# Patient Record
Sex: MALE | Race: BLACK OR AFRICAN AMERICAN | NOT HISPANIC OR LATINO | Employment: OTHER | URBAN - METROPOLITAN AREA
[De-identification: names, ages, dates, MRNs, and addresses within clinical notes are randomized per-mention and may not be internally consistent; named-entity substitution may affect disease eponyms.]

---

## 2017-01-31 ENCOUNTER — OFFICE VISIT (OUTPATIENT)
Dept: BARIATRICS | Facility: CLINIC | Age: 47
End: 2017-01-31
Payer: MEDICARE

## 2017-01-31 VITALS
HEART RATE: 76 BPM | WEIGHT: 315 LBS | SYSTOLIC BLOOD PRESSURE: 130 MMHG | HEIGHT: 67 IN | DIASTOLIC BLOOD PRESSURE: 74 MMHG | BODY MASS INDEX: 49.44 KG/M2

## 2017-01-31 DIAGNOSIS — E66.01 MORBID OBESITY WITH BMI OF 70 AND OVER, ADULT: Primary | ICD-10-CM

## 2017-01-31 PROCEDURE — 99499 UNLISTED E&M SERVICE: CPT | Mod: S$GLB,,, | Performed by: INTERNAL MEDICINE

## 2017-01-31 PROCEDURE — 99213 OFFICE O/P EST LOW 20 MIN: CPT | Mod: S$GLB,,, | Performed by: INTERNAL MEDICINE

## 2017-01-31 PROCEDURE — 1159F MED LIST DOCD IN RCRD: CPT | Mod: S$GLB,,, | Performed by: INTERNAL MEDICINE

## 2017-01-31 PROCEDURE — 99999 PR PBB SHADOW E&M-EST. PATIENT-LVL III: CPT | Mod: PBBFAC,,, | Performed by: INTERNAL MEDICINE

## 2017-01-31 PROCEDURE — 3078F DIAST BP <80 MM HG: CPT | Mod: S$GLB,,, | Performed by: INTERNAL MEDICINE

## 2017-01-31 PROCEDURE — 3075F SYST BP GE 130 - 139MM HG: CPT | Mod: S$GLB,,, | Performed by: INTERNAL MEDICINE

## 2017-01-31 RX ORDER — DIETHYLPROPION HYDROCHLORIDE 75 MG/1
75 TABLET, EXTENDED RELEASE ORAL DAILY
Qty: 30 TABLET | Refills: 0 | Status: SHIPPED | OUTPATIENT
Start: 2017-01-31 | End: 2018-08-13

## 2017-01-31 NOTE — MR AVS SNAPSHOT
[facility]  [address]  [place] [address]  Phone: [phone]  Fax: [phone]                  Ke Oliver   2017 9:45 AM   Office Visit    Description:  Male : 1970   Provider:  Aby Anaya MD   Department:  [facility]           Reason for Visit     Follow-up           Diagnoses this Visit        Comments    Morbid obesity with BMI of 70 and over, adult    -  Primary            To Do List           Future Appointments        Provider Department Dept Phone    2017 10:00 AM MD [facility] [phone]      Goals (5 Years of Data)     None       These Medications        Disp Refills Start End    diethylpropion 75 mg TbSR 30 tablet 0 2017     Take 75 mg by mouth once daily. - Oral    Pharmacy: [facility]/pharm[facility] #[id number] - [place] - [address]  #: [phone]         [facility]     [facility] -  Assistance  Registered nurses in the [facility] Center provide clinical advisement, health education, appointment booking, and other advisory services.  Call for this free service at [phone].             Medications           Message regarding Medications     Verify the changes and/or additions to your medication regime listed below are the same as discussed with your clinician today.  If any of these changes or additions are incorrect, please notify your healthcare provider.             Verify that the below list of medications is an accurate representation of the medications you are currently taking.  If none reported, the list may be blank. If incorrect, please contact your healthcare provider. Carry this list with you in case of emergency.           Current Medications     albuterol (PROAIR HFA) 90 mcg/actuation inhaler Inhale 2 puffs into the lungs every 6 (six) hours as needed for Wheezing.    betamethasone dipropionate (DIPROLENE)  "0.05 % ointment Apply topically 2 (two) times daily. Apply to hands bid    cholecalciferol, vitamin D3, 2,000 unit Cap Take 1 capsule by mouth once daily.    diclofenac sodium 1 % Gel Apply 2 g four times daily to affected knee for relief of pain    diethylpropion 75 mg TbSR Take 75 mg by mouth once daily.    fluocinonide (LIDEX) 0.05 % external solution Apply topically once daily.    hydrochlorothiazide (HYDRODIURIL) 12.5 MG Tab Take 12.5 mg by mouth once daily.    ketoconazole (NIZORAL) 2 % shampoo Apply topically twice a week. Apply to scalp, lather, let sit 2-5 minutes, then rinse    lorazepam (ATIVAN) 0.5 MG tablet Take 1 tablet (0.5 mg total) by mouth every 8 (eight) hours as needed for Anxiety.    metformin (GLUCOPHAGE) 500 MG tablet Take 1 tablet (500 mg total) by mouth 2 (two) times daily with meals.    pantoprazole (PROTONIX) 40 MG tablet Take 1 tablet (40 mg total) by mouth before breakfast.    pramipexole (MIRAPEX) 0.5 MG tablet     ropinirole (REQUIP) 0.5 MG tablet Take 1 tablet (0.5 mg total) by mouth every evening.           Clinical Reference Information           Vital Signs - Last Recorded  Most recent update: 1/31/2017  9:50 AM by Troy Loyd MA    BP Pulse Ht Wt BMI    130/74 76 5' 7" (1.702 m) (!) 241.7 kg (532 lb 13.6 oz) 83.46 kg/m2      Blood Pressure          Most Recent Value    BP  130/74      Allergies as of 1/31/2017     No Known Allergies      Immunizations Administered on Date of Encounter - 1/31/2017     None      Instructions    Continue with diet and exercise.    Avoid all grains, rice, potatoes, pasta and bread.     Unlimited green vegetables, tomatoes, mushrooms, spaghetti squash, cauliflower, meat, poultry, seafood, eggs and hard cheeses.   Avoid fried foods  Dressings, seasonings, condiments, etc should have less than 2 g sugars.    beans or nuts can have 1 x a day.   1-2 servings of citrus fruits, berries, pineapple or melon a day (1/2 cup)  Milk and plain " yogurt      [email or web address]    No soda, sweet tea, juices or lemonade    Patient warned of common side effects of diethylpropion including anxiety, insomnia, palpitations and increased blood pressure. It was also explained that it is for short-term usage along with diet and exercise, and that stopping the medication without making lifestyle changes will result in regain of weight. Patient states understanding.      Return in about 4 weeks

## 2017-01-31 NOTE — PROGRESS NOTES
"Subjective:       Patient ID: Ke Oliver is a 46 y.o. male.    Chief Complaint: Follow-up    HPI Comments: Pt here today for follow up. Has lost 12 lbs in the past month, net neg 1 lbs. Started metformin last month. Did a little better with diet this month. He did have some starches a few times. He did start the diethylpropion as well. He states he does not take the diethylpropion on the weekend. He denies SE with either. Going to gym 2 times a week. Hope to increase this.    Review of Systems   Constitutional: Negative for chills and fever.   Respiratory: Positive for apnea. Negative for shortness of breath.         Uses CPAP nightly   Cardiovascular: Positive for leg swelling. Negative for chest pain.   Gastrointestinal: Negative for constipation and diarrhea.        Denies GERD sx. On PPI   Genitourinary: Negative for difficulty urinating and dysuria.   Musculoskeletal: Positive for arthralgias. Negative for back pain.   Neurological: Negative for dizziness and light-headedness.   Psychiatric/Behavioral: Negative for dysphoric mood. The patient is not nervous/anxious.        Objective:       Visit Vitals    /74    Pulse 76    Ht 5' 7" (1.702 m)    Wt (!) 241.7 kg (532 lb 13.6 oz)    BMI 83.46 kg/m2       Physical Exam   Constitutional: He is oriented to person, place, and time. He appears well-developed. No distress.   Morbidly obese     HENT:   Head: Normocephalic and atraumatic.   Eyes: Pupils are equal, round, and reactive to light.   Neck: Normal range of motion. Neck supple.   Cardiovascular: Normal rate and regular rhythm.    Distant heart sounds   Pulmonary/Chest: Effort normal and breath sounds normal.   Musculoskeletal: Normal range of motion. He exhibits no edema.   Neurological: He is alert and oriented to person, place, and time.   Skin: Skin is warm and dry.   Psychiatric: He has a normal mood and affect. His behavior is normal. Judgment normal.   Vitals reviewed.      Assessment:     "   1. Morbid obesity with BMI of 70 and over, adult        Plan:       1. Morbid obesity with BMI of 70 and over, adult  Doing better. He will need to remain consistent with eating and exercise  - diethylpropion 75 mg TbSR; Take 75 mg by mouth once daily.  Dispense: 30 tablet; Refill: 0    Continue with diet and exercise.    Avoid all grains, rice, potatoes, pasta and bread.     [email or web address]    No soda, sweet tea, juices or lemonade    Patient warned of common side effects of diethylpropion including anxiety, insomnia, palpitations and increased blood pressure. It was also explained that it is for short-term usage along with diet and exercise, and that stopping the medication without making lifestyle changes will result in regain of weight. Patient states understanding.      Return in about 4 weeks

## 2017-01-31 NOTE — PATIENT INSTRUCTIONS
Continue with diet and exercise.    Avoid all grains, rice, potatoes, pasta and bread.     Unlimited green vegetables, tomatoes, mushrooms, spaghetti squash, cauliflower, meat, poultry, seafood, eggs and hard cheeses.   Avoid fried foods  Dressings, seasonings, condiments, etc should have less than 2 g sugars.    beans or nuts can have 1 x a day.   1-2 servings of citrus fruits, berries, pineapple or melon a day (1/2 cup)  Milk and plain yogurt      [email or web address]    No soda, sweet tea, juices or lemonade    Patient warned of common side effects of diethylpropion including anxiety, insomnia, palpitations and increased blood pressure. It was also explained that it is for short-term usage along with diet and exercise, and that stopping the medication without making lifestyle changes will result in regain of weight. Patient states understanding.      Return in about 4 weeks

## 2017-03-30 ENCOUNTER — TELEPHONE (OUTPATIENT)
Dept: INTERNAL MEDICINE | Facility: CLINIC | Age: 47
End: 2017-03-30

## 2017-03-30 ENCOUNTER — OFFICE VISIT (OUTPATIENT)
Dept: OPTOMETRY | Facility: CLINIC | Age: 47
End: 2017-03-30
Payer: MEDICARE

## 2017-03-30 DIAGNOSIS — H52.4 PRESBYOPIA: ICD-10-CM

## 2017-03-30 DIAGNOSIS — R73.03 PREDIABETES: ICD-10-CM

## 2017-03-30 DIAGNOSIS — G47.33 OBSTRUCTIVE SLEEP APNEA ON CPAP: ICD-10-CM

## 2017-03-30 DIAGNOSIS — I10 ESSENTIAL HYPERTENSION: Primary | ICD-10-CM

## 2017-03-30 DIAGNOSIS — H25.13 NS (NUCLEAR SCLEROSIS), BILATERAL: ICD-10-CM

## 2017-03-30 PROCEDURE — 92015 DETERMINE REFRACTIVE STATE: CPT | Mod: S$GLB,,, | Performed by: OPTOMETRIST

## 2017-03-30 PROCEDURE — 92004 COMPRE OPH EXAM NEW PT 1/>: CPT | Mod: S$GLB,,, | Performed by: OPTOMETRIST

## 2017-03-30 PROCEDURE — 99999 PR PBB SHADOW E&M-EST. PATIENT-LVL II: CPT | Mod: PBBFAC,,, | Performed by: OPTOMETRIST

## 2017-03-30 NOTE — PROGRESS NOTES
HPI     Patient's age: 46 y.o.    Approximate date of last eye examination:  4 yrs ago  Name of last eye doctor seen:     Pt states that he recently started medication for diabetes was told by the   doctor to get eyes exam.     Wears glasses? yes     Wears CLs?:  no          Headaches?  no  Eye pain/discomfort?  no                                                                                     Flashes?  no  Floaters?  no  Diplopia/Double vision?  no    Patient's Ocular History:         Any eye surgeries? no         Family history of eye disease?  no    Significant patient medical history:         1. Diabetes?  yes       If yes, IDDM or NIDDM? NIDDM   2. HBP?  Yes, medication and diet control                 ! OTC eyedrops currently using:  none   ! Prescription eye meds currently using:  None    Hemoglobin A1C       Date                     Value               Ref Range             Status                04/27/2016               5.8                 4.5 - 6.2 %           Final                 07/17/2007               6.0                 4.5 - 6.2 %           Final            ----------              Last edited by Adriana Martin MA on 3/30/2017  1:16 PM.         Assessment /Plan     For exam results, see Encounter Report.    Essential hypertension  Prediabetes  No retinopathy, monitor yearly    Obstructive sleep apnea on CPAP   Healthy optic nerve appearance   Normal IOP    NS (nuclear sclerosis), bilateral   Mild, monitor    Conjunctival injection   Use art tears BID/PRN    Presbyopia   Rx specs    RTC 1 year, sooner PRN

## 2017-03-30 NOTE — TELEPHONE ENCOUNTER
rcvd a request from [facility] pt is requesting CPAP supplies   Once order is in I will fax everything back to N

## 2017-04-07 ENCOUNTER — PATIENT MESSAGE (OUTPATIENT)
Dept: INTERNAL MEDICINE | Facility: CLINIC | Age: 47
End: 2017-04-07

## 2017-05-11 ENCOUNTER — TELEPHONE (OUTPATIENT)
Dept: SLEEP MEDICINE | Facility: CLINIC | Age: 47
End: 2017-05-11

## 2017-05-11 DIAGNOSIS — G47.33 OSA (OBSTRUCTIVE SLEEP APNEA): Primary | ICD-10-CM

## 2017-05-12 NOTE — TELEPHONE ENCOUNTER
----- Message from Kaley Monique, RRT sent at 2017  1:45 PM CDT -----  Regarding: RX  Hey Dr. Santiago, I have Mr Oliver in my office right now to get Cpap supplies. His RX  17. He has been battling the insurance company for a few months trying to get authorization. Could you send over a RX for supplies please? He wears a nasal mask.    Thank you  Patricia

## 2017-07-21 ENCOUNTER — OFFICE VISIT (OUTPATIENT)
Dept: INTERNAL MEDICINE | Facility: CLINIC | Age: 47
End: 2017-07-21
Payer: MEDICARE

## 2017-07-21 ENCOUNTER — HOSPITAL ENCOUNTER (OUTPATIENT)
Dept: RADIOLOGY | Facility: HOSPITAL | Age: 47
Discharge: HOME | End: 2017-07-21
Attending: INTERNAL MEDICINE
Payer: MEDICARE

## 2017-07-21 VITALS
BODY MASS INDEX: 50.62 KG/M2 | WEIGHT: 315 LBS | SYSTOLIC BLOOD PRESSURE: 132 MMHG | HEIGHT: 66 IN | DIASTOLIC BLOOD PRESSURE: 65 MMHG | HEART RATE: 69 BPM

## 2017-07-21 DIAGNOSIS — M79.604 RIGHT LEG PAIN: ICD-10-CM

## 2017-07-21 DIAGNOSIS — E55.9 VITAMIN D INSUFFICIENCY: ICD-10-CM

## 2017-07-21 DIAGNOSIS — I10 ESSENTIAL HYPERTENSION: ICD-10-CM

## 2017-07-21 DIAGNOSIS — E61.1 IRON DEFICIENCY: ICD-10-CM

## 2017-07-21 DIAGNOSIS — M79.604 RIGHT LEG PAIN: Primary | ICD-10-CM

## 2017-07-21 PROCEDURE — 73610 X-RAY EXAM OF ANKLE: CPT | Mod: TC,RT

## 2017-07-21 PROCEDURE — 99499 UNLISTED E&M SERVICE: CPT | Mod: S$GLB,,, | Performed by: INTERNAL MEDICINE

## 2017-07-21 PROCEDURE — 73610 X-RAY EXAM OF ANKLE: CPT | Mod: 26,RT,, | Performed by: RADIOLOGY

## 2017-07-21 PROCEDURE — 99999 PR PBB SHADOW E&M-EST. PATIENT-LVL III: CPT | Mod: PBBFAC,,, | Performed by: INTERNAL MEDICINE

## 2017-07-21 PROCEDURE — 99213 OFFICE O/P EST LOW 20 MIN: CPT | Mod: S$GLB,,, | Performed by: INTERNAL MEDICINE

## 2017-07-21 NOTE — PROGRESS NOTES
"Subjective:       Patient ID: Ke Oliver is a 47 y.o. male.    Chief Complaint: Leg Pain (rt shin on the inside)    HPI    Last visit with me 08/2016.  Over the last 2 weeks he has developed significant pain in his right lower leg between the knee and the ankle.  There is a focal area of significant tenderness.  He has not noted any skin change.  Denies any trauma to the area.  Reports about one week before symptoms started when [facility] his leg buckled a little bit, no other falls or problems.  He has a history of right ankle surgery with pins.    Review of Systems    As per HPI    Objective:      Physical Exam   Constitutional: He is oriented to person, place, and time. No distress.   -American man whose Body mass index is 88.74 kg/m².    Musculoskeletal:   tenderness to mild palpation in RLE in focal area approx 1 inch in size.   Neurological: He is alert and oriented to person, place, and time.   Skin: Skin is warm and dry. No rash noted. No erythema.   Nursing note and vitals reviewed.      Vitals:    07/21/17 1055   BP: 132/65   BP Location: Right arm   Patient Position: Sitting   BP Method: Automatic   Pulse: 69   Weight: (!) 249.4 kg (549 lb 13.2 oz)   Height: 5' 6" (1.676 m)     Body mass index is 88.74 kg/m².    RESULTS: Reviewed labs from last 12 months    Assessment:       1. Right leg pain    2. Iron deficiency    3. Vitamin D insufficiency    4. Essential hypertension        Plan:   Ke was seen today for leg pain.    Diagnoses and all orders for this visit:    Right leg pain:  Likely muscle sprain, use over-the-counter NSAIDS. Confirm R ankle surgery is intact.  -     X-Ray Ankle Complete Right; Future    Iron deficiency  -     Hematology Profile (CBC without Differential); Future  -     Ferritin; Future  -     Iron and TIBC; Future    Vitamin D insufficiency  -     Vitamin D; Future    Essential hypertension  -     Comprehensive metabolic panel; Future    Return in about 8 weeks (around " 9/15/2017) for fasting labs 1 week prior. Assess chronic conditions and set up for annual exam at that time.  Corey Craig MD  Internal Medicine    Portions of this note were completed using Dragon medical dictation software. Please excuse typographical or syntax errors.

## 2017-07-21 NOTE — PATIENT INSTRUCTIONS
Use over-the-counter Aleve, 1-2 pills two times a day as needed. Use no more than 1 week. Please also use over-the-counter extra-strength Tylenol 500 mg, 1 tab every 4 hours as needed for pain.

## 2017-09-22 ENCOUNTER — LAB VISIT (OUTPATIENT)
Dept: LAB | Facility: HOSPITAL | Age: 47
End: 2017-09-22
Attending: INTERNAL MEDICINE
Payer: MEDICARE

## 2017-09-22 DIAGNOSIS — I10 ESSENTIAL HYPERTENSION: ICD-10-CM

## 2017-09-22 DIAGNOSIS — E55.9 VITAMIN D INSUFFICIENCY: ICD-10-CM

## 2017-09-22 DIAGNOSIS — E61.1 IRON DEFICIENCY: ICD-10-CM

## 2017-09-22 LAB
25(OH)D3+25(OH)D2 SERPL-MCNC: 19 NG/ML
ALBUMIN SERPL BCP-MCNC: 3.4 G/DL
ALP SERPL-CCNC: 67 U/L
ALT SERPL W/O P-5'-P-CCNC: 10 U/L
ANION GAP SERPL CALC-SCNC: 9 MMOL/L
AST SERPL-CCNC: 10 U/L
BILIRUB SERPL-MCNC: 0.4 MG/DL
BUN SERPL-MCNC: 13 MG/DL
CALCIUM SERPL-MCNC: 9.3 MG/DL
CHLORIDE SERPL-SCNC: 105 MMOL/L
CO2 SERPL-SCNC: 27 MMOL/L
CREAT SERPL-MCNC: 0.8 MG/DL
ERYTHROCYTE [DISTWIDTH] IN BLOOD BY AUTOMATED COUNT: 13.9 %
EST. GFR  (AFRICAN AMERICAN): >60 ML/MIN/1.73 M^2
EST. GFR  (NON AFRICAN AMERICAN): >60 ML/MIN/1.73 M^2
FERRITIN SERPL-MCNC: 93 NG/ML
GLUCOSE SERPL-MCNC: 94 MG/DL
HCT VFR BLD AUTO: 40.5 %
HGB BLD-MCNC: 12.9 G/DL
IRON SERPL-MCNC: 62 UG/DL
MCH RBC QN AUTO: 27.9 PG
MCHC RBC AUTO-ENTMCNC: 31.9 G/DL
MCV RBC AUTO: 88 FL
PLATELET # BLD AUTO: 284 K/UL
PMV BLD AUTO: 10.4 FL
POTASSIUM SERPL-SCNC: 4.5 MMOL/L
PROT SERPL-MCNC: 7.3 G/DL
RBC # BLD AUTO: 4.63 M/UL
SATURATED IRON: 18 %
SODIUM SERPL-SCNC: 141 MMOL/L
TOTAL IRON BINDING CAPACITY: 340 UG/DL
TRANSFERRIN SERPL-MCNC: 230 MG/DL
WBC # BLD AUTO: 7.32 K/UL

## 2017-09-22 PROCEDURE — 82306 VITAMIN D 25 HYDROXY: CPT

## 2017-09-22 PROCEDURE — 36415 COLL VENOUS BLD VENIPUNCTURE: CPT

## 2017-09-22 PROCEDURE — 82728 ASSAY OF FERRITIN: CPT

## 2017-09-22 PROCEDURE — 83540 ASSAY OF IRON: CPT

## 2017-09-22 PROCEDURE — 85027 COMPLETE CBC AUTOMATED: CPT

## 2017-09-22 PROCEDURE — 80053 COMPREHEN METABOLIC PANEL: CPT

## 2017-09-29 ENCOUNTER — OFFICE VISIT (OUTPATIENT)
Dept: INTERNAL MEDICINE | Facility: CLINIC | Age: 47
End: 2017-09-29
Payer: MEDICARE

## 2017-09-29 VITALS
DIASTOLIC BLOOD PRESSURE: 80 MMHG | BODY MASS INDEX: 49.44 KG/M2 | WEIGHT: 315 LBS | HEIGHT: 67 IN | SYSTOLIC BLOOD PRESSURE: 132 MMHG | HEART RATE: 68 BPM

## 2017-09-29 DIAGNOSIS — F41.9 ANXIETY: ICD-10-CM

## 2017-09-29 DIAGNOSIS — R06.01 ORTHOPNEA: Primary | ICD-10-CM

## 2017-09-29 DIAGNOSIS — E55.9 VITAMIN D DEFICIENCY: ICD-10-CM

## 2017-09-29 DIAGNOSIS — G25.81 RESTLESS LEG SYNDROME: ICD-10-CM

## 2017-09-29 DIAGNOSIS — M54.2 NECK PAIN ON LEFT SIDE: ICD-10-CM

## 2017-09-29 DIAGNOSIS — G57.13 MERALGIA PARESTHETICA OF BOTH LOWER EXTREMITIES: ICD-10-CM

## 2017-09-29 DIAGNOSIS — L21.9 SEBORRHEIC DERMATITIS OF SCALP: ICD-10-CM

## 2017-09-29 DIAGNOSIS — L30.9 HAND ECZEMA: ICD-10-CM

## 2017-09-29 DIAGNOSIS — G47.33 OBSTRUCTIVE SLEEP APNEA ON CPAP: ICD-10-CM

## 2017-09-29 DIAGNOSIS — M25.562 POSTERIOR LEFT KNEE PAIN: ICD-10-CM

## 2017-09-29 PROCEDURE — 3079F DIAST BP 80-89 MM HG: CPT | Mod: S$GLB,,, | Performed by: INTERNAL MEDICINE

## 2017-09-29 PROCEDURE — 3075F SYST BP GE 130 - 139MM HG: CPT | Mod: S$GLB,,, | Performed by: INTERNAL MEDICINE

## 2017-09-29 PROCEDURE — 99214 OFFICE O/P EST MOD 30 MIN: CPT | Mod: S$GLB,,, | Performed by: INTERNAL MEDICINE

## 2017-09-29 PROCEDURE — 99999 PR PBB SHADOW E&M-EST. PATIENT-LVL IV: CPT | Mod: PBBFAC,,, | Performed by: INTERNAL MEDICINE

## 2017-09-29 PROCEDURE — 3008F BODY MASS INDEX DOCD: CPT | Mod: S$GLB,,, | Performed by: INTERNAL MEDICINE

## 2017-09-29 RX ORDER — ROPINIROLE 1 MG/1
1 TABLET, FILM COATED ORAL NIGHTLY
Qty: 90 TABLET | Refills: 3 | Status: SHIPPED | OUTPATIENT
Start: 2017-09-29 | End: 2019-02-06

## 2017-09-29 RX ORDER — ACETAMINOPHEN 500 MG
1 TABLET ORAL DAILY
Qty: 90 CAPSULE | Refills: 3 | Status: CANCELLED | OUTPATIENT
Start: 2017-09-29

## 2017-09-29 RX ORDER — FLUOCINONIDE TOPICAL SOLUTION USP, 0.05% 0.5 MG/ML
SOLUTION TOPICAL DAILY
Qty: 60 ML | Refills: 2 | Status: SHIPPED | OUTPATIENT
Start: 2017-09-29 | End: 2019-02-06

## 2017-09-29 RX ORDER — LORAZEPAM 0.5 MG/1
0.5 TABLET ORAL DAILY PRN
Qty: 10 TABLET | Refills: 0 | Status: SHIPPED | OUTPATIENT
Start: 2017-09-29 | End: 2018-05-22

## 2017-09-29 RX ORDER — CYCLOBENZAPRINE HCL 5 MG
TABLET ORAL
Qty: 30 TABLET | Refills: 1 | Status: SHIPPED | OUTPATIENT
Start: 2017-09-29 | End: 2019-02-06

## 2017-09-29 RX ORDER — BETAMETHASONE DIPROPIONATE 0.5 MG/G
OINTMENT TOPICAL 2 TIMES DAILY
Qty: 60 G | Refills: 3 | Status: SHIPPED | OUTPATIENT
Start: 2017-09-29 | End: 2019-05-23

## 2017-09-29 NOTE — PROGRESS NOTES
"Subjective:       Patient ID: Ke Oliver is a 47 y.o. male.    Chief Complaint: Follow-up    HPI    Last visit with me 07/2017. About 2 mo ago was moving to new apartment. Was having trouble sleeping. Not gasping for breath. Feeling some anxiety. Sits up on side of bed for about an hour. Still using CPAP. Doesn't occur every night.  Hasn't seen sleep medicine team since 2015.    For last 2-3 weeks, after the move, noted left neck pain and right leg pain. Hasn't been back to gym. L knee hurting in back of leg. L upper shoulder by neck also with pain. Tried ibuprofen 800 mg without relief.    At night when lying down he develops burning pain in the anterior lateral thighs.  Continues to have restless legs.    Review of Systems    As per HPI    Objective:      Physical Exam   Constitutional: He is oriented to person, place, and time. No distress.   -American man whose Body mass index is 86.7 kg/m².    Musculoskeletal:   No tenderness to palpation in left shoulder along trapezius. Tender to moderate palpation in posterior left knee, full range of motion at knee without joint laxity, no anterior tenderness.   Neurological: He is alert and oriented to person, place, and time.   Psychiatric: He has a normal mood and affect. His behavior is normal.   Nursing note and vitals reviewed.      Vitals:    09/29/17 0909   BP: 132/80   BP Location: Right arm   Patient Position: Sitting   BP Method: Large (Manual)   Pulse: 68   Weight: (!) 251.1 kg (553 lb 9.2 oz)   Height: 5' 7" (1.702 m)     Body mass index is 86.7 kg/m².    RESULTS: Reviewed labs from last 2 months    Assessment:       1. Orthopnea    2. Obstructive sleep apnea on CPAP    3. Hand eczema    4. Seborrheic dermatitis of scalp    5. Posterior left knee pain    6. Restless leg syndrome    7. Vitamin D deficiency    8. Anxiety    9. Meralgia paresthetica of both lower extremities    10. Neck pain on left side        Plan:   Ke was seen today for " follow-up.    Diagnoses and all orders for this visit:    Orthopnea:  Only a few nights, associated with anxiety; not every night, less likely PND but has risk factors for CHF. Please notify the office if the symptoms persist or worsen and will consider echocardiogram.    Obstructive sleep apnea on CPAP:  Hasn't had check of settings for >2 yrs, refer back to Sleep team.  -     Ambulatory consult for Sleep Study    Hand eczema:  Prior diagnosis, well controlled on current management. No changes at this time, will continue to monitor.   -     betamethasone dipropionate (DIPROLENE) 0.05 % ointment; Apply topically 2 (two) times daily. Apply to hands bid    Seborrheic dermatitis of scalp:  Prior diagnosis, well controlled on current management. No changes at this time, will continue to monitor.   -     fluocinonide (LIDEX) 0.05 % external solution; Apply topically once daily.    Posterior left knee pain:  Likely tendinitis and/or muscle strain, start Flexeril, counseled about side effects, refer to PM&R.  -     Ambulatory Referral to Physical Medicine Rehab  -     cyclobenzaprine (FLEXERIL) 5 MG tablet; Take one-half, one, or two tablets every 8 hours as needed for muscle pain.    Restless leg syndrome:  Increase dose of Requip, restart over-the-counter iron supplement.  -     ropinirole (REQUIP) 1 MG tablet; Take 1 tablet (1 mg total) by mouth every evening.    Vitamin D deficiency:  Start over-the-counter supplement.    Anxiety:  Only sporadically, use Ativan PRN.  -     lorazepam (ATIVAN) 0.5 MG tablet; Take 1 tablet (0.5 mg total) by mouth daily as needed for Anxiety.    Meralgia paresthetica of both lower extremities:  Will benefit from weight loss, but in meantime refer to PM&R to see what other treatment options are available.  -     Ambulatory Referral to Physical Medicine Rehab    Neck pain on left side:  Trapezius mm strain, refer to PM&R  -     Ambulatory Referral to Physical Medicine Rehab  -      cyclobenzaprine (FLEXERIL) 5 MG tablet; Take one-half, one, or two tablets every 8 hours as needed for muscle pain.    Other orders  -     Cancel: cholecalciferol, vitamin D3, 2,000 unit Cap; Take 1 capsule (2,000 Units total) by mouth once daily.    Return in about 4 months (around 1/29/2018) for EPP annual exam.  Corey Craig MD  Internal Medicine    Portions of this note were completed using Dragon medical dictation software. Please excuse typographical or syntax errors.

## 2017-09-29 NOTE — PATIENT INSTRUCTIONS
You have Vitamin D deficiency. Please start a daily Vitamin D3 supplement called cholecalciferol, taking 2000 (two thousand) units (IU) once a day. This can be found over-the-counter.    I would like you to start an over-the-counter iron supplement called Ferrous Sulfate 325 mg, taken once daily. Side effects that some people experience with iron supplements are some mild stomach upset, black-colored stools, and constipation. If you develop constipation, please take an over-the-counter stool softener like Colace, Metamucil, or Dulcolax. If you have problems taking the iron supplement, please notify the office so we can discuss other options.    Please start Requip 1 mg every night for restless leg syndrome. If after 1 week the symptoms persist and so long as you're not having side effects, increase to 2 pills at night. If this dose works better, notify the office so we can send in a new prescription for 2 mg tabs.

## 2018-04-11 ENCOUNTER — TELEPHONE (OUTPATIENT)
Dept: INTERNAL MEDICINE | Facility: CLINIC | Age: 48
End: 2018-04-11

## 2018-04-11 NOTE — TELEPHONE ENCOUNTER
----- Message from Larry Lombardi sent at 4/10/2018 12:33 PM CDT -----  Contact: self  Pt called in about wanting to see if can get a earlier appt than 6/2018 for a check-up.Pt would like the nurse to give him a call back in regards to this matter      Pt can be reached at [phone]      TY

## 2018-04-14 ENCOUNTER — OFFICE VISIT (OUTPATIENT)
Dept: INTERNAL MEDICINE | Facility: CLINIC | Age: 48
End: 2018-04-14
Payer: MEDICARE

## 2018-04-14 VITALS
BODY MASS INDEX: 49.44 KG/M2 | HEIGHT: 67 IN | OXYGEN SATURATION: 99 % | DIASTOLIC BLOOD PRESSURE: 80 MMHG | SYSTOLIC BLOOD PRESSURE: 158 MMHG | TEMPERATURE: 98 F | WEIGHT: 315 LBS | HEART RATE: 80 BPM

## 2018-04-14 DIAGNOSIS — G47.33 OBSTRUCTIVE SLEEP APNEA ON CPAP: ICD-10-CM

## 2018-04-14 DIAGNOSIS — Z00.00 ANNUAL PHYSICAL EXAM: Primary | ICD-10-CM

## 2018-04-14 DIAGNOSIS — E66.01 MORBID OBESITY WITH BODY MASS INDEX OF 70 AND OVER IN ADULT: ICD-10-CM

## 2018-04-14 DIAGNOSIS — E55.9 VITAMIN D DEFICIENCY: ICD-10-CM

## 2018-04-14 DIAGNOSIS — R60.0 BILATERAL LOWER EXTREMITY EDEMA: ICD-10-CM

## 2018-04-14 DIAGNOSIS — K21.9 GASTROESOPHAGEAL REFLUX DISEASE, ESOPHAGITIS PRESENCE NOT SPECIFIED: ICD-10-CM

## 2018-04-14 DIAGNOSIS — R06.09 DYSPNEA ON EXERTION: ICD-10-CM

## 2018-04-14 DIAGNOSIS — R73.03 PREDIABETES: ICD-10-CM

## 2018-04-14 DIAGNOSIS — I10 ESSENTIAL HYPERTENSION: ICD-10-CM

## 2018-04-14 PROCEDURE — 3077F SYST BP >= 140 MM HG: CPT | Mod: CPTII,S$GLB,, | Performed by: INTERNAL MEDICINE

## 2018-04-14 PROCEDURE — 99396 PREV VISIT EST AGE 40-64: CPT | Mod: S$GLB,,, | Performed by: INTERNAL MEDICINE

## 2018-04-14 PROCEDURE — 99999 PR PBB SHADOW E&M-EST. PATIENT-LVL III: CPT | Mod: PBBFAC,,, | Performed by: INTERNAL MEDICINE

## 2018-04-14 PROCEDURE — 3079F DIAST BP 80-89 MM HG: CPT | Mod: CPTII,S$GLB,, | Performed by: INTERNAL MEDICINE

## 2018-04-14 RX ORDER — HYDROCHLOROTHIAZIDE 12.5 MG/1
12.5 TABLET ORAL DAILY
Qty: 90 TABLET | Refills: 3 | Status: SHIPPED | OUTPATIENT
Start: 2018-04-14 | End: 2019-05-23 | Stop reason: ALTCHOICE

## 2018-04-14 RX ORDER — ERGOCALCIFEROL 1.25 MG/1
50000 CAPSULE ORAL WEEKLY
Qty: 12 CAPSULE | Refills: 3 | Status: SHIPPED | OUTPATIENT
Start: 2018-04-14 | End: 2018-05-14

## 2018-04-14 NOTE — PROGRESS NOTES
Subjective:       Patient ID: Ke Oliver is a 47 y.o. male.    Chief Complaint: Annual Exam    HPI    Last visit with me September 2017.  Was referred to physical medicine for meralgia paresthetica in the past, patient did not show to his appointment.  Also missed a previous appointment with sleep medicine.    Over last 3 mo reports some stiffness or cramping in shoulder. On left side. occasionally with panic attacks. Hasn't been going to gym. Still using CPAP, but finds that every 3-4 hours will wake up. Mostly when reclining or resting, also often associated with meals. Not taking any antacids. Has reported some dyspnea on exertion and swelling in bilateral lower extremities around ankles.    Restarted smoking briefly. Had smoked fro 10 yrs until yr 2000. Then started smoking cigars, 1 cigar/day. About 3 mo ago had restarted smoking after a long time being away.    When was on metformin noted some side effects and didn't notice too many benefits with weight.    Reviewed PMH, PSH, SH, FH, allergies, and medications.     Review of Systems   All other systems reviewed and are negative.      Objective:      Physical Exam   Constitutional: He is oriented to person, place, and time. No distress.   HENT:   Head: Atraumatic.   Mouth/Throat: Oropharynx is clear and moist. No oropharyngeal exudate.   Eyes: Pupils are equal, round, and reactive to light. Right eye exhibits no discharge. Left eye exhibits no discharge.   Neck: Normal range of motion. No thyromegaly present.   Cardiovascular: Normal rate, regular rhythm and normal heart sounds.    Pulmonary/Chest: Effort normal and breath sounds normal. No stridor. He has no wheezes. He has no rales.   Abdominal: Soft. There is no tenderness. There is no guarding.   Musculoskeletal: He exhibits edema (tense pitting edema in b/L LE up to above ankles). He exhibits no tenderness.   Lymphadenopathy:     He has no cervical adenopathy.   Neurological: He is alert and oriented  "to person, place, and time.   Skin: Skin is warm and dry. No rash noted.   Coarse skin in medial aspect of right hand   Psychiatric: He has a normal mood and affect. His behavior is normal.   Nursing note and vitals reviewed.      Vitals:    04/14/18 0804   BP: (!) 158/80   BP Location: Left arm   Patient Position: Sitting   Pulse: 80   Temp: 98.1 °F (36.7 °C)   TempSrc: Oral   SpO2: 99%   Weight: (!) 255.6 kg (563 lb 7.9 oz)   Height: 5' 7" (1.702 m)     Body mass index is 88.26 kg/m².    RESULTS: Reviewed labs from last 12 months    Assessment:       1. Annual physical exam    2. Obstructive sleep apnea on CPAP    3. Morbid obesity with body mass index of 70 and over in adult    4. Prediabetes    5. Essential hypertension    6. Gastroesophageal reflux disease, esophagitis presence not specified    7. Dyspnea on exertion    8. Bilateral lower extremity edema    9. Vitamin D deficiency        Plan:   Ke was seen today for annual exam.    Diagnoses and all orders for this visit:    Annual physical exam:  Age-appropriate health screening reviewed, indicated tests ordered.   -     Comprehensive metabolic panel; Future  -     Magnesium; Future  -     TSH; Future  -     Brain natriuretic peptide; Future  -     CBC Without Differential; Future    Obstructive sleep apnea on CPAP:  Prior dx, continues to use CPAP, needs evaluation of system, schedule follow up with Sleep Med.   -     CPAP/BIPAP SUPPLIES    Morbid obesity with body mass index of 70 and over in adult:  Prior dx, pt weight continues to increase. Plans to restart going to gym for exercise. continue to monitor and discuss long term management at future visits.  -     TSH; Future    Prediabetes:  Prior dx, blood sugar had been well controlled on metformin, recheck levels in future.    Essential hypertension:  Prior diagnosis, symptoms are worse since stopping HCTZ. Restart HCTZ, restart exercise, check at next visit.   -     hydroCHLOROthiazide (HYDRODIURIL) " "12.5 MG Tab; Take 1 tablet (12.5 mg total) by mouth once daily.  -     Comprehensive metabolic panel; Future  -     Magnesium; Future  -     CBC Without Differential; Future    Gastroesophageal reflux disease, esophagitis presence not specified:  New problem, likely cause of left sided chest discomfort. Start Zantac:  "Please start ranitidine two times a day before meals for 2 weeks to calm down stomach acid. After 2 weeks use as needed if symptoms of stomach upset or other similar symptoms develop."  -     ranitidine (ZANTAC) 150 MG tablet; Take 1 tablet (150 mg total) by mouth 2 (two) times daily with meals.    Dyspnea on exertion:  New problem. Likely due to deconditioning and obesity, rule out CHF or CAD on echocardiogram.  -     Pharmacological stress echo; Future    Bilateral lower extremity edema:  New problem, likely due to stopping blood pressure med and increased weight, check heart function.  -     Brain natriuretic peptide; Future    Vitamin D deficiency:  Prior dx. Start weekly D2 instead of daily meds. Recheck levels with next labs.  -     Vitamin D; Future  -     ergocalciferol (ERGOCALCIFEROL) 50,000 unit Cap; Take 1 capsule (50,000 Units total) by mouth once a week.    Follow-up in about 4 months (around 8/14/2018) for fasting labs 1 week prior.  Corey Craig MD  Internal Medicine    Portions of this note were completed using [employer] dictation software. Please excuse typographical or syntax errors.   "

## 2018-04-14 NOTE — PATIENT INSTRUCTIONS
Please start ranitidine two times a day before meals for 2 weeks to calm down stomach acid. After 2 weeks use as needed if symptoms of stomach upset or other similar symptoms develop.

## 2018-04-17 ENCOUNTER — OFFICE VISIT (OUTPATIENT)
Dept: SLEEP MEDICINE | Facility: CLINIC | Age: 48
End: 2018-04-17
Payer: MEDICARE

## 2018-04-17 VITALS
WEIGHT: 315 LBS | SYSTOLIC BLOOD PRESSURE: 129 MMHG | HEIGHT: 67 IN | HEART RATE: 64 BPM | BODY MASS INDEX: 49.44 KG/M2 | DIASTOLIC BLOOD PRESSURE: 73 MMHG

## 2018-04-17 DIAGNOSIS — G47.33 OSA (OBSTRUCTIVE SLEEP APNEA): Primary | ICD-10-CM

## 2018-04-17 PROCEDURE — 99214 OFFICE O/P EST MOD 30 MIN: CPT | Mod: S$GLB,,, | Performed by: PSYCHIATRY & NEUROLOGY

## 2018-04-17 PROCEDURE — 99999 PR PBB SHADOW E&M-EST. PATIENT-LVL III: CPT | Mod: PBBFAC,,, | Performed by: PSYCHIATRY & NEUROLOGY

## 2018-04-17 PROCEDURE — 3078F DIAST BP <80 MM HG: CPT | Mod: CPTII,S$GLB,, | Performed by: PSYCHIATRY & NEUROLOGY

## 2018-04-17 PROCEDURE — 3074F SYST BP LT 130 MM HG: CPT | Mod: CPTII,S$GLB,, | Performed by: PSYCHIATRY & NEUROLOGY

## 2018-04-17 NOTE — PROGRESS NOTES
Mr. Oliver returns today regarding the management of obstructive sleep apnea and equipment check. Since last seen 2014, he has continued to use CPAP. He got a new machine 3/24/15 (apap 16-20). Denies pressure intolerance or oral drying. Occasional nasal drying and excessive sinus moisture when heat was higher than 2. Occasional. He would like to use larger size nasal mask. His buffer filter is black for unknown reason and he doesn't have replacement ones. He called ins for new supplies but authorization not yet rec'd by [facility]. Not using chin strap anymore. He denies mask air leaks. Using therapy, he reports continued improvement of his excessive daytime sleepiness. He feels refreshed upon awakening. He wears his mask nightly. His medical co-morbidities remain morbid obesity.     +tingling/numb sensation to lower extremities prolonged standing, in evening time, worse at bedtime, improved partially with stretching/moving legs while trying to go to sleep. Denies family hx RLS/PABLO. Mirapex 0.5mg qhs remains effective--only uses occasionally.        PSG: July 2012 [facility]: AHI 43/Low sat 64, best results observed at 20cm (AHI 1 at this pressure, unsure if supine REM sleep observed)     INTERVAL HISTORY:    04/17/2018  The patient has not presented any new complaints since the previous visit. Not eligible for a new machine till 3/2010.  Needs new supplies. Benefiting from CPAP use in terms of sleep continuity and daytime sleepiness. Compliant.  A little more restless lately and may be more tired on some days. Uncomfortable pillow. Exercises. No caffeine. No TV in bed.  Gained 40 lbs.  APAP 16-20   Therapy Event Summary Date Range: 3/17/2018 - 4/15/2018     Hide      Compliance Summary  Apnea Indices  Ventilator Statistics    Days with Device Usage:  30 days  Average AHI:  0.7  Average Breath Rate:  N/A    Percentage of Days >=4 Hours:  100.0%  Average OA Index:  0.4  Average % Patient Triggered Breaths:  N/A    Average  "Usage (Days Used):  7 hrs. 17 mins. 55 secs.  Average CA Index:  0.1  Average Tidal Volume:  N/A    Average Usage (All Days):  7 hrs. 17 mins. 55 secs.    Average Minute Vent:  N/A        Large Leak  Periodic Breathing     Average Time in Large Leak:  30 secs.  Average % of Night in PB:  0.3%     Average % of Night in Large Leak:  0.1%            Airflow looked good on download        Titration study: (555#) 3/12/14: Reports tingly LE frequently. No dream enactment behavior was noted. REM atonia was preserved. CPAP was explored from 8 to 17 cm of water using a Eson nasal Large mask, chin strap, CFlex at 3, and heated humidification. Initial improvement was observed on 14 cm H2O controlling respiratory events in supine NREM sleep. Effective control of sleep disordered breathing was observed in supine NREM sleep on 17 cm H2O.     Interrogation: aPAP machine good condition, 30d avg 6:48h/night. 29/30 days>4hs. Using M Eson mask. AHI 0.5. 0% air leak. Heat at 1. 90% 16.1cm.       ESS= 11 (12) (14) (15)   SH: Disabled.     REVIEW OF SYSTEMS: Sleep related symptoms as per HPI;+obesity, 8# weight loss/last 4mos.  Seasonal sinus congestion; +dyspnea occasionally; denies palpitations; Denies polyuria; morning headaches, muscle pain, anemia, acid reflux, or sinus congestion. Occasional mood disturbance. Otherwise a balance review of 10-systems is negative.     PHYSICAL EXAM:   /73 (BP Location: Right arm, Patient Position: Sitting, BP Method: Large (Automatic))   Pulse 64   Ht 5' 7" (1.702 m)   Wt (!) 253.1 kg (558 lb)   BMI 87.40 kg/m²   GENERAL: morbid obese body habitus, well groomed       ASSESSMENT:   Obstructive sleep apnea, moderate, continued adherence with PAP therapy, with improvement of snoring/apneic pauses/air gasping, daytime sleepiness.  Benefiting from CPAP use in terms of sleep continuity and daytime sleepiness.   Complaint. Gained 40 lbs since last time. More restless and tired lately. But good " AHi on download.     RLS, stable  Medical comorbidities morbid obesity. TRequip 1 mg takes a long time to work    PLAN:     1 Continue APAP 16-20cm. NEw supplies ordered - asked to [facility]  2. Try to exchange pillow, exercise more - if no better in a month - will order PSG given 40 wt gain  2. Continue Requip - 1-2 mg. Try on an empty stomach. If no help - will switch to Mirapex    3. Discussed health benefits of continued PAP use, and the potential ramifications of untreated JUDSON.   4. Advised to abstain from driving should he feel sleepy or drowsy.   5. Encouraged continued weight loss for potential improvement of JUDSON and overall health benefits.   6. RTC 12 mos for adherence monitoring, sooner if needed.

## 2018-04-17 NOTE — PATIENT INSTRUCTIONS
PLAN:     1 Continue APAP 16-20cm. NEw supplies ordered - asked to [facility]  2. Try to exchange pillow, exercise more - if no better in a month - will order PSG given 40 wt gain  2. Continue Requip - 1-2 mg. Try on an empty stomach. If no help - will switch to Mirapex

## 2018-04-19 ENCOUNTER — HOSPITAL ENCOUNTER (OUTPATIENT)
Dept: CARDIOLOGY | Facility: CLINIC | Age: 48
Discharge: HOME | End: 2018-04-19
Attending: INTERNAL MEDICINE
Payer: MEDICARE

## 2018-04-19 ENCOUNTER — DOCUMENTATION ONLY (OUTPATIENT)
Dept: CARDIOLOGY | Facility: CLINIC | Age: 48
End: 2018-04-19

## 2018-04-19 DIAGNOSIS — R06.09 DYSPNEA ON EXERTION: ICD-10-CM

## 2018-04-19 LAB
DIASTOLIC DYSFUNCTION: NO
MITRAL VALVE MOBILITY: NORMAL
RETIRED EF AND QEF - SEE NOTES: 65 (ref 55–65)

## 2018-04-19 PROCEDURE — 96372 THER/PROPH/DIAG INJ SC/IM: CPT | Mod: 59,S$GLB,, | Performed by: INTERNAL MEDICINE

## 2018-04-19 PROCEDURE — 93321 DOPPLER ECHO F-UP/LMTD STD: CPT | Mod: S$GLB,,, | Performed by: INTERNAL MEDICINE

## 2018-04-19 PROCEDURE — 93352 ADMIN ECG CONTRAST AGENT: CPT | Mod: S$GLB,,, | Performed by: INTERNAL MEDICINE

## 2018-04-19 PROCEDURE — 93351 STRESS TTE COMPLETE: CPT | Mod: S$GLB,,, | Performed by: INTERNAL MEDICINE

## 2018-04-19 NOTE — PROGRESS NOTES
Patient identified by 2 patient identifiers. Denies reactions to blood transfusions. Procedure explained and informed consent obtained.  24 gauge iv started with sterile technique, positive blood return and flushed without difficulty. 3cc optison administered for DSE and images obtained. DSE complete. IV discontinued intact with pressure dsg applied. Patient tolerated well.

## 2018-04-23 ENCOUNTER — TELEPHONE (OUTPATIENT)
Dept: INTERNAL MEDICINE | Facility: CLINIC | Age: 48
End: 2018-04-23

## 2018-04-23 NOTE — TELEPHONE ENCOUNTER
Please call the patient to notify that his stress test was normal, no sign of blockages in his coronary arteries.    Results have been released to [facility] but have not been reviewed. If the patient is not able to access [facility], please instruct them to call tech support services to reset the password at [phone]. If the patient would not like to be active on the [facility] Patient Portal, please let me know so I can deactivate access and we will use letters and phone calls to communicate instead.

## 2018-05-22 ENCOUNTER — HOSPITAL ENCOUNTER (OUTPATIENT)
Dept: RADIOLOGY | Facility: HOSPITAL | Age: 48
Discharge: HOME | End: 2018-05-22
Attending: INTERNAL MEDICINE
Payer: MEDICARE

## 2018-05-22 ENCOUNTER — OFFICE VISIT (OUTPATIENT)
Dept: INTERNAL MEDICINE | Facility: CLINIC | Age: 48
End: 2018-05-22
Payer: MEDICARE

## 2018-05-22 VITALS
SYSTOLIC BLOOD PRESSURE: 132 MMHG | DIASTOLIC BLOOD PRESSURE: 72 MMHG | WEIGHT: 315 LBS | HEART RATE: 72 BPM | HEIGHT: 67 IN | BODY MASS INDEX: 49.44 KG/M2

## 2018-05-22 DIAGNOSIS — M25.562 ACUTE PAIN OF LEFT KNEE: Primary | ICD-10-CM

## 2018-05-22 DIAGNOSIS — E66.01 MORBID OBESITY WITH BODY MASS INDEX OF 70 AND OVER IN ADULT: Chronic | ICD-10-CM

## 2018-05-22 DIAGNOSIS — M17.0 BILATERAL PRIMARY OSTEOARTHRITIS OF KNEE: ICD-10-CM

## 2018-05-22 DIAGNOSIS — M25.562 ACUTE PAIN OF LEFT KNEE: ICD-10-CM

## 2018-05-22 PROCEDURE — 3008F BODY MASS INDEX DOCD: CPT | Mod: CPTII,GC,S$GLB, | Performed by: INTERNAL MEDICINE

## 2018-05-22 PROCEDURE — 99213 OFFICE O/P EST LOW 20 MIN: CPT | Mod: GC,S$GLB,, | Performed by: INTERNAL MEDICINE

## 2018-05-22 PROCEDURE — 99499 UNLISTED E&M SERVICE: CPT | Mod: S$GLB,,, | Performed by: INTERNAL MEDICINE

## 2018-05-22 PROCEDURE — 3078F DIAST BP <80 MM HG: CPT | Mod: CPTII,GC,S$GLB, | Performed by: INTERNAL MEDICINE

## 2018-05-22 PROCEDURE — 99999 PR PBB SHADOW E&M-EST. PATIENT-LVL V: CPT | Mod: PBBFAC,GC,, | Performed by: INTERNAL MEDICINE

## 2018-05-22 PROCEDURE — 3075F SYST BP GE 130 - 139MM HG: CPT | Mod: CPTII,GC,S$GLB, | Performed by: INTERNAL MEDICINE

## 2018-05-22 PROCEDURE — 73565 X-RAY EXAM OF KNEES: CPT | Mod: 26,,, | Performed by: RADIOLOGY

## 2018-05-22 PROCEDURE — 73565 X-RAY EXAM OF KNEES: CPT | Mod: TC

## 2018-05-22 RX ORDER — MELOXICAM 15 MG/1
15 TABLET ORAL DAILY
Qty: 10 TABLET | Refills: 0 | Status: SHIPPED | OUTPATIENT
Start: 2018-05-22 | End: 2018-06-01

## 2018-05-22 NOTE — PROGRESS NOTES
"Subjective:      Patient ID: Ke Oliver is a 47 y.o. male.    Chief Complaint: Knee Pain    Mr. Oliver is a 47/M with PMH morbid obesity with BMI 88, HTN, JUDSON on CPAP, vitamin D deficiency, and prediabetes who presents for urgent evaluation of L knee pain. Started having pain Friday morning in L knee. No trauma or injury he recalls, was walking throughout the weekend preparing for family reunion this coming weekend. He feels discomfort "like it wants to pop for a joint." Denies significant swelling that he has noted, erythema, or external wound. No history of gout. Has a history of DJD of the knees as noted in 2016. No other acute concerns at this time.      Review of Systems   Constitutional: Negative for chills and fever.   HENT: Negative for sore throat and trouble swallowing.    Eyes: Negative for pain and visual disturbance.   Respiratory: Negative for cough and shortness of breath.    Cardiovascular: Negative for chest pain and palpitations.   Gastrointestinal: Negative for abdominal pain, nausea and vomiting.   Genitourinary: Negative for difficulty urinating and dysuria.   Musculoskeletal: Positive for arthralgias. Negative for joint swelling and myalgias.   Skin: Negative for rash and wound.   Neurological: Negative for weakness and numbness.     Objective:     Vitals:    05/22/18 1343   BP: 132/72   Pulse:      Physical Exam   Constitutional: He is oriented to person, place, and time. He appears well-developed. No distress.   Morbidly obese   HENT:   Head: Normocephalic and atraumatic.   Eyes: Conjunctivae and EOM are normal.   Cardiovascular: Normal rate, regular rhythm, S1 normal, S2 normal and intact distal pulses.    Pulmonary/Chest: Effort normal and breath sounds normal.   Abdominal: Soft. There is no tenderness.   Morbidly obese   Musculoskeletal:        Left knee: He exhibits no swelling. Tenderness found. Medial joint line and lateral joint line tenderness noted.   Exam significantly limited " by body habitus. No superficial erythema or significant change in knee size compared to R. Some crepitus on passive ROM.   Neurological: He is alert and oriented to person, place, and time.   Skin: Skin is warm and dry.   Nursing note and vitals reviewed.    Assessment:      1. Acute pain of left knee    2. Bilateral primary osteoarthritis of knee    3. Morbid obesity with body mass index of 70 and over in adult      Plan:     Ke was seen today for knee pain.    Diagnoses and all orders for this visit:    Acute pain of left knee, bilateral primary osteoarthritis of knees  -     X-Ray Knee AP Standing Bilateral; Future  -     meloxicam (MOBIC) 15 MG tablet; Take 1 tablet (15 mg total) by mouth once daily.  -     Ambulatory Referral to Orthopedics if pain not improved after NSAID use    Morbid obesity with body mass index of 70 and over in adult        - Discussed weight as a contributing factor to his knee pain overall and osteoarthritis. Will refer to [facility] for nutrition and fitness.  -     Ambulatory Referral to Nutrition - [facility]  -     Ambulatory Referral to Medical Fitness (MEDFIT)  -     [facility] ALCIDES ASSIGN QUESTIONNAIRE SERIES (Unmetric)  -     [facility] Patient Entered [facility] (Unmetric)    Return to clinic in 4 weeks for follow-up with Dr. Craig.    Discussed with staff, Dr. Motley.    MIKE Nicole MD   PGY-3  [phone]

## 2018-05-22 NOTE — PATIENT INSTRUCTIONS
- Start taking meloxicam 15mg by mouth daily for 10 days.  - X-ray of the knees to check for progression of osteoarthritis.  - Referral to orthopedics if knee pain persists; they may want to do further testing or consider other treatments (like injections).   - Referral to [facility] and nutrition for weight loss recommendations.  - Follow-up with Dr. Craig in 4 weeks, or return to clinic sooner if needed.      Understanding Osteoarthritis of the Knee    A joint is a place where two bones meet. The knee is called a hinge joint. This joint is formed where the thighbone (femur) meets the shinbone (tibia). A healthy knee joint bends freely. Knee osteoarthritis is a condition where parts of the knee joint wear out. This can lead to pain, stiffness, and limited movement.   What is osteoarthritis?  Every joint contains a smooth tissue called cartilage. Cartilage cushions the ends of bones and helps bones in a joint glide smoothly against each other. Knee osteoarthritis occurs when cartilage in the knee joint begins to break down and wear away. Bones may become exposed and rub together. The cartilage may become irritated and rough. This prevents smooth movement of the joint and can lead to pain.  Causes of osteoarthritis of the knee  Causes can include:  · Wear and tear from normal use over time  · Overuse of the knee during sports or work activities  · Being overweight. This increases stress on the knee joint.  · Misalignment of the knee joint  · Injury to the knee  Symptoms of osteoarthritis of the knee  Common symptoms include:  · Pain and swelling around the joint. The pain and swelling get worse with activity and better with rest.  · Grinding sound when moving the knee  · Reduced knee movement  · Knee stiffness. This is often worse first thing in the morning.  Treating osteoarthritis of the knee  Osteoarthritis is a long-term condition. Treatment usually focuses on managing symptoms. Treatment may  include:  · Over-the-counter or prescription medicines taken by mouth to help relieve pain and swelling  · Injections of medicine into the joint to help relieve symptoms for a time  · A weight-loss plan for people who are overweight  · A plan of physical therapy and exercises to improve the strength and flexibility of the muscles around the knee  · Heat or cold therapy to help relieve pain and stiffness  · Assistive devices that help with movement, such as a cane or a walker  · Assistive devices that make activities of daily life easier, such as raised toilet seats or shower bars  If other treatments dont do enough to relieve symptoms, you may need surgery to replace the joint. During this surgery, the damaged joint is removed. An artificial knee joint is then put into place. This can help relieve pain and stiffness and restore movement of the knee.     When to call your healthcare provider  Call your healthcare provider right away if you have any of these:  · Fever of 100.4°F (38°C) or higher, or as directed  · Symptoms that dont get better with prescribed medicines or get worse  · New symptoms   Date Last Reviewed: 3/10/2016  © 2095-6048 [employer]. [address] [place] [address]. All rights reserved. This information is not intended as a substitute for professional medical care. Always follow your healthcare professional's instructions.

## 2018-05-23 ENCOUNTER — TELEPHONE (OUTPATIENT)
Dept: INTERNAL MEDICINE | Facility: CLINIC | Age: 48
End: 2018-05-23

## 2018-05-23 NOTE — TELEPHONE ENCOUNTER
05/23/2018  0900    Called patient regarding knee X-ray results. OA present bilaterally, R>L. Has started meloxicam and feeling some improvement. No further concerns at this time.    MIKE Nicole MD   PGY-3  [phone]

## 2018-05-24 NOTE — PROGRESS NOTES
I have reviewed the notes, assessments, and/or procedures performed by Dr. Villar, I concur with his documentation of Ke Oliver.

## 2018-06-06 ENCOUNTER — OFFICE VISIT (OUTPATIENT)
Dept: ORTHOPEDICS | Facility: CLINIC | Age: 48
End: 2018-06-06
Payer: MEDICARE

## 2018-06-06 VITALS
HEART RATE: 66 BPM | WEIGHT: 315 LBS | DIASTOLIC BLOOD PRESSURE: 80 MMHG | SYSTOLIC BLOOD PRESSURE: 128 MMHG | HEIGHT: 67 IN | BODY MASS INDEX: 49.44 KG/M2

## 2018-06-06 DIAGNOSIS — E66.01 MORBID OBESITY WITH BODY MASS INDEX OF 70 AND OVER IN ADULT: ICD-10-CM

## 2018-06-06 DIAGNOSIS — M17.0 PRIMARY OSTEOARTHRITIS OF BOTH KNEES: Primary | ICD-10-CM

## 2018-06-06 PROCEDURE — 99203 OFFICE O/P NEW LOW 30 MIN: CPT | Mod: 25,S$GLB,, | Performed by: PHYSICIAN ASSISTANT

## 2018-06-06 PROCEDURE — 3008F BODY MASS INDEX DOCD: CPT | Mod: CPTII,S$GLB,, | Performed by: PHYSICIAN ASSISTANT

## 2018-06-06 PROCEDURE — 99499 UNLISTED E&M SERVICE: CPT | Mod: S$GLB,,, | Performed by: PHYSICIAN ASSISTANT

## 2018-06-06 PROCEDURE — 3074F SYST BP LT 130 MM HG: CPT | Mod: CPTII,S$GLB,, | Performed by: PHYSICIAN ASSISTANT

## 2018-06-06 PROCEDURE — 20610 DRAIN/INJ JOINT/BURSA W/O US: CPT | Mod: LT,S$GLB,, | Performed by: PHYSICIAN ASSISTANT

## 2018-06-06 PROCEDURE — 3079F DIAST BP 80-89 MM HG: CPT | Mod: CPTII,S$GLB,, | Performed by: PHYSICIAN ASSISTANT

## 2018-06-06 PROCEDURE — 99999 PR PBB SHADOW E&M-EST. PATIENT-LVL III: CPT | Mod: PBBFAC,,, | Performed by: PHYSICIAN ASSISTANT

## 2018-06-06 RX ORDER — BETAMETHASONE SODIUM PHOSPHATE AND BETAMETHASONE ACETATE 3; 3 MG/ML; MG/ML
6 INJECTION, SUSPENSION INTRA-ARTICULAR; INTRALESIONAL; INTRAMUSCULAR; SOFT TISSUE
Status: COMPLETED | OUTPATIENT
Start: 2018-06-06 | End: 2018-06-06

## 2018-06-06 RX ADMIN — BETAMETHASONE SODIUM PHOSPHATE AND BETAMETHASONE ACETATE 6 MG: 3; 3 INJECTION, SUSPENSION INTRA-ARTICULAR; INTRALESIONAL; INTRAMUSCULAR; SOFT TISSUE at 10:06

## 2018-06-06 NOTE — PROGRESS NOTES
SUBJECTIVE:     Chief Complaint & History of Present Illness:  Ke Oliver is a New patient 48 y.o. male who is seen here today with a complaint of    Chief Complaint   Patient presents with    Left Knee - Pain    .  Patient's here today for evaluation treatment of progressively worsening left knee pain over the past several months.  He is aware he has a significant arthritis in the knee but is having greater difficulty with the long standing ambulation.  He is actively trying to get his weight under control with efforts to move towards the ability to undergo knee replacement surgery.  Unfortunate is increasing pain or making it difficult to carry out exercise activities  On a scale of 1-10, with 10 being worst pain imaginable, he rates this pain as 4 on good days and 7 on bad days.  he describes the pain as achy.    Review of patient's allergies indicates:  No Known Allergies      Current Outpatient Prescriptions   Medication Sig Dispense Refill    cyclobenzaprine (FLEXERIL) 5 MG tablet Take one-half, one, or two tablets every 8 hours as needed for muscle pain. 30 tablet 1    diclofenac sodium 1 % Gel Apply 2 g four times daily to affected knee for relief of pain 100 g 11    diethylpropion 75 mg TbSR Take 75 mg by mouth once daily. 30 tablet 0    fluocinonide (LIDEX) 0.05 % external solution Apply topically once daily. 60 mL 2    hydroCHLOROthiazide (HYDRODIURIL) 12.5 MG Tab Take 1 tablet (12.5 mg total) by mouth once daily. 90 tablet 3    ketoconazole (NIZORAL) 2 % shampoo Apply topically twice a week. Apply to scalp, lather, let sit 2-5 minutes, then rinse 120 mL 1    ranitidine (ZANTAC) 150 MG tablet Take 1 tablet (150 mg total) by mouth 2 (two) times daily with meals. 60 tablet 11    ropinirole (REQUIP) 1 MG tablet Take 1 tablet (1 mg total) by mouth every evening. 90 tablet 3    betamethasone dipropionate (DIPROLENE) 0.05 % ointment Apply topically 2 (two) times daily. Apply to hands bid 60 g 3  "    No current facility-administered medications for this visit.        Past Medical History:   Diagnosis Date    Hypertension     Obesity     Sleep apnea        Past Surgical History:   Procedure Laterality Date    ANKLE SURGERY  1978    ankle fracture pinnned    COLONOSCOPY N/A 8/4/2016    COLONOSCOPY;  Surgeon: Carson Mittal MD    HERNIA REPAIR  1999    herniated testicle       Vital Signs (Most Recent)  Vitals:    06/06/18 0817   BP: 128/80   Pulse: 66           Review of Systems:  ROS:  Constitutional: no fever or chills, Morbid obesity BMI 89.43  Eyes: no visual changes  ENT: no nasal congestion or sore throat  Respiratory: no cough or shortness of breath  Cardiovascular: no chest pain or palpitations  Gastrointestinal: no nausea or vomiting, tolerating diet  Genitourinary: no hematuria or dysuria  Integument/Breast: no rash or pruritis  Hematologic/Lymphatic: no easy bruising or lymphadenopathy  Musculoskeletal: no arthralgias or myalgias  Neurological: no seizures or tremors  Behavioral/Psych: no auditory or visual hallucinations  Endocrine: no heat or cold intolerance                OBJECTIVE:     PHYSICAL EXAM:  Height: 5' 7" (170.2 cm) Weight: (!) 259 kg (570 lb 15.9 oz), General Appearance: Well nourished, well developed, in no acute distress.  Neurological: Mood & affect are normal.  left  Knee Exam:  Knee Range of Motion:0-95 degrees flexion   Effusion:none  Condition of skin:intact  Location of tenderness:Medial joint line   Strength:limited by pain and 5 of 5  Stability:  Lachman: stable, LCL: stable, MCL: stable, PCL: stable and posteromedial (dial): stable  Varus /Valgus stress:  normal  Nathaly:   negative/negative    right  Knee Exam:  Knee Range of Motion:0-90 degrees flexion   Effusion:none  Condition of skin:intact  Location of tenderness:Medial joint line   Strength:limited by pain and 5 of 5  Stability:  Lachman: stable, LCL: stable, MCL: stable, PCL: stable and posteromedial " (dial): stable  Varus /Valgus stress:  normal  Nathaly:   negative/negative      Hip Examination:  normal    RADIOGRAPHS:  X-rays from previous visit reviewed by me today demonstrate moderate arthritic changes throughout both knees right more so than left with complete loss of medial joint space on the right osteophytic spurring and sclerotic changes tricompartmentally in bilateral knees    ASSESSMENT/PLAN:     Plan: We discussed with the patient at length all the different treatment options available for  the knee including anti-inflammatories, acetaminophen, rest, ice, knee strengthening exercise, occasional cortisone injections for temporary relief, Viscosupplimentation injections, arthroscopic debridement osteotomy, and finally knee arthroplasty.   We'll proceed with therapeutic diagnostic cortisone injection left knee today to address his immediate symptoms     The injection site was identified and the skin was prepared with a betadine solution. The   left  knee was injected with 1 ml of Celestone and 5 ml Lidocaine under sterile technique. Ke Oliver tolerated the procedure well, he was advised to rest the knee today, ice and elevation. he did receive immediate relief of the pain in and about his knee he was told this would be short lived and is secondary to the lidocaine. he may have an increase in his discomfort tonight followed by steady improvement over the next several days. I may take 1-3 weeks following the injection to get the full benefit of the medication.  I will see him back in 3-6 months. Sooner if he has any problems or concerns.

## 2018-06-06 NOTE — LETTER
June 6, 2018      MIKE Villar MD  [address]  [place] [address]           [facility] Orthoped[facility]  [address]  [place] [address]  Phone: [phone]          Patient: Ke Oliver   MR Number: [id number]   YOB: 1970   Date of Visit: 6/6/2018       Dear Dr. MIKE Villar:    Thank you for referring Ke Oliver to me for evaluation. Attached you will find relevant portions of my assessment and plan of care.    If you have questions, please do not hesitate to call me. I look forward to following Ke Oliver along with you.    Sincerely,    Jesse Harris PA-C    Enclosure  CC:  No Recipients    If you would like to receive this communication electronically, please contact [email or web address] or [phone] to request more information on [employer] access.    For providers and/or their staff who would like to refer a patient to [facility], please contact us through our one-stop-shop provider referral line, [facility], at [phone].    If you feel you have received this communication in error or would no longer like to receive these types of communications, please e-mail [email or web address]

## 2018-08-13 ENCOUNTER — OFFICE VISIT (OUTPATIENT)
Dept: INTERNAL MEDICINE | Facility: CLINIC | Age: 48
End: 2018-08-13
Payer: MEDICARE

## 2018-08-13 VITALS
WEIGHT: 315 LBS | HEIGHT: 67 IN | HEART RATE: 77 BPM | BODY MASS INDEX: 49.44 KG/M2 | SYSTOLIC BLOOD PRESSURE: 131 MMHG | OXYGEN SATURATION: 95 % | DIASTOLIC BLOOD PRESSURE: 73 MMHG

## 2018-08-13 DIAGNOSIS — K21.9 GASTROESOPHAGEAL REFLUX DISEASE, ESOPHAGITIS PRESENCE NOT SPECIFIED: Primary | ICD-10-CM

## 2018-08-13 DIAGNOSIS — E66.01 MORBID OBESITY: ICD-10-CM

## 2018-08-13 DIAGNOSIS — M25.562 ACUTE PAIN OF LEFT KNEE: ICD-10-CM

## 2018-08-13 PROCEDURE — 3075F SYST BP GE 130 - 139MM HG: CPT | Mod: CPTII,S$GLB,, | Performed by: INTERNAL MEDICINE

## 2018-08-13 PROCEDURE — 3078F DIAST BP <80 MM HG: CPT | Mod: CPTII,S$GLB,, | Performed by: INTERNAL MEDICINE

## 2018-08-13 PROCEDURE — 99999 PR PBB SHADOW E&M-EST. PATIENT-LVL IV: CPT | Mod: PBBFAC,,, | Performed by: INTERNAL MEDICINE

## 2018-08-13 PROCEDURE — 99213 OFFICE O/P EST LOW 20 MIN: CPT | Mod: S$GLB,,, | Performed by: INTERNAL MEDICINE

## 2018-08-13 PROCEDURE — 3008F BODY MASS INDEX DOCD: CPT | Mod: CPTII,S$GLB,, | Performed by: INTERNAL MEDICINE

## 2018-08-13 RX ORDER — IBUPROFEN 800 MG/1
TABLET ORAL
COMMUNITY
Start: 2018-08-02 | End: 2018-08-13 | Stop reason: ALTCHOICE

## 2018-08-13 RX ORDER — IBUPROFEN 800 MG/1
800 TABLET ORAL 2 TIMES DAILY PRN
Qty: 60 TABLET | Refills: 2 | Status: SHIPPED | OUTPATIENT
Start: 2018-08-13 | End: 2018-09-12

## 2018-08-13 RX ORDER — CLINDAMYCIN HYDROCHLORIDE 150 MG/1
CAPSULE ORAL
COMMUNITY
Start: 2018-08-02 | End: 2019-02-06

## 2018-08-13 RX ORDER — PENICILLIN V POTASSIUM 500 MG/1
TABLET, FILM COATED ORAL
COMMUNITY
Start: 2018-07-02 | End: 2018-08-13 | Stop reason: ALTCHOICE

## 2018-08-13 NOTE — PATIENT INSTRUCTIONS
Please use ibuprofen 800 mg two times a day as needed for knee or joint pain. If you can avoid using it daily then please do. When using continue ranitidine to prevent stomach irritation. With this it is also safe to use over-the-counter Tylenol as needed, extra-strength (500 mg) every 4 hours as needed.    If pain flares are not controlled with this regimen, please notify the office.

## 2018-08-13 NOTE — PROGRESS NOTES
"Subjective:       Patient ID: Ke Oliver is a 48 y.o. male.    Chief Complaint: Follow-up    HPI    Since last visit seen by Sleep Med, Internal Medicine, and Orthopedics. Reports knee pain is improved since injection. Was using ibuprofen with good results of knee. Took H2RA with this and GERD was well controlled.    Review of Systems    As per HPI    Objective:      Physical Exam   Constitutional: He is oriented to person, place, and time. No distress.   -American man whose Body mass index is 89.08 kg/m².    Neurological: He is alert and oriented to person, place, and time.   Psychiatric: He has a normal mood and affect. His behavior is normal.   Nursing note and vitals reviewed.      Vitals:    08/13/18 1459   BP: 131/73   BP Location: Left arm   Patient Position: Sitting   BP Method: Large (Manual)   Pulse: 77   SpO2: 95%   Weight: (!) 258 kg (568 lb 12.6 oz)   Height: 5' 7" (1.702 m)     Body mass index is 89.08 kg/m².    RESULTS: Reviewed labs from last 12 months    Assessment:       1. Gastroesophageal reflux disease, esophagitis presence not specified    2. Acute pain of left knee    3. Morbid obesity        Plan:   Ke was seen today for follow-up.    Diagnoses and all orders for this visit:    Gastroesophageal reflux disease, esophagitis presence not specified:  Age-appropriate health screening reviewed, indicated tests ordered.     Acute pain of left knee:  Prior dx, improved with injection from Orthopedics. Use of NSAIDs and Tylenol PRN. If knee pain not well controlled with NSAIDs and Tylenol, consider trial of Norco only periodically (counseled side effects).  "Please use ibuprofen 800 mg two times a day as needed for knee or joint pain. If you can avoid using it daily then please do. When using continue ranitidine to prevent stomach irritation. With this it is also safe to use over-the-counter Tylenol as needed, extra-strength (500 mg) every 4 hours as needed.  If pain flares are not " "controlled with this regimen, please notify the office."  -     ibuprofen (ADVIL,MOTRIN) 800 MG tablet; Take 1 tablet (800 mg total) by mouth 2 (two) times daily as needed for Pain.  -     KNEE BRACE FOR HOME USE    Morbid obesity:  Refer back to Bariatric Surgery for evaluation, pt considering procedure.  -     Ambulatory Referral to Bariatric Surgery ([facility])      Follow-up in about 6 months (around 2/13/2019) for follow up visit. Fasting labs next few days.  Corey Craig MD  Internal Medicine    Portions of this note were completed using medical dictation software. Please excuse typographical or syntax errors that were missed on review.    "

## 2018-08-14 ENCOUNTER — TELEPHONE (OUTPATIENT)
Dept: INTERNAL MEDICINE | Facility: CLINIC | Age: 48
End: 2018-08-14

## 2018-08-14 DIAGNOSIS — Z00.00 ANNUAL PHYSICAL EXAM: Primary | ICD-10-CM

## 2018-08-14 DIAGNOSIS — I10 ESSENTIAL HYPERTENSION: ICD-10-CM

## 2018-08-14 NOTE — TELEPHONE ENCOUNTER
The pt was seen yesterday and you had ordered labs  The TSH is not covered  Did you want to order something else?

## 2018-08-31 ENCOUNTER — PATIENT MESSAGE (OUTPATIENT)
Dept: INTERNAL MEDICINE | Facility: CLINIC | Age: 48
End: 2018-08-31

## 2018-08-31 ENCOUNTER — LAB VISIT (OUTPATIENT)
Dept: LAB | Facility: HOSPITAL | Age: 48
End: 2018-08-31
Attending: INTERNAL MEDICINE
Payer: MEDICARE

## 2018-08-31 DIAGNOSIS — E66.01 MORBID OBESITY WITH BODY MASS INDEX OF 70 AND OVER IN ADULT: ICD-10-CM

## 2018-08-31 DIAGNOSIS — R73.03 PREDIABETES: ICD-10-CM

## 2018-08-31 DIAGNOSIS — R60.0 BILATERAL LOWER EXTREMITY EDEMA: ICD-10-CM

## 2018-08-31 DIAGNOSIS — I10 ESSENTIAL HYPERTENSION: ICD-10-CM

## 2018-08-31 DIAGNOSIS — E55.9 VITAMIN D INSUFFICIENCY: Primary | ICD-10-CM

## 2018-08-31 DIAGNOSIS — E55.9 VITAMIN D DEFICIENCY: ICD-10-CM

## 2018-08-31 DIAGNOSIS — Z00.00 ANNUAL PHYSICAL EXAM: ICD-10-CM

## 2018-08-31 LAB
25(OH)D3+25(OH)D2 SERPL-MCNC: 21 NG/ML
ALBUMIN SERPL BCP-MCNC: 3.5 G/DL
ALP SERPL-CCNC: 63 U/L
ALT SERPL W/O P-5'-P-CCNC: 14 U/L
ANION GAP SERPL CALC-SCNC: 9 MMOL/L
AST SERPL-CCNC: 12 U/L
BILIRUB SERPL-MCNC: 0.4 MG/DL
BNP SERPL-MCNC: 20 PG/ML
BUN SERPL-MCNC: 14 MG/DL
CALCIUM SERPL-MCNC: 9.3 MG/DL
CHLORIDE SERPL-SCNC: 104 MMOL/L
CO2 SERPL-SCNC: 27 MMOL/L
CREAT SERPL-MCNC: 0.8 MG/DL
ERYTHROCYTE [DISTWIDTH] IN BLOOD BY AUTOMATED COUNT: 14 %
EST. GFR  (AFRICAN AMERICAN): >60 ML/MIN/1.73 M^2
EST. GFR  (NON AFRICAN AMERICAN): >60 ML/MIN/1.73 M^2
ESTIMATED AVG GLUCOSE: 114 MG/DL
GLUCOSE SERPL-MCNC: 95 MG/DL
HBA1C MFR BLD HPLC: 5.6 %
HCT VFR BLD AUTO: 41.6 %
HGB BLD-MCNC: 13 G/DL
MAGNESIUM SERPL-MCNC: 2.2 MG/DL
MCH RBC QN AUTO: 27.8 PG
MCHC RBC AUTO-ENTMCNC: 31.3 G/DL
MCV RBC AUTO: 89 FL
PLATELET # BLD AUTO: 253 K/UL
PMV BLD AUTO: 10.3 FL
POTASSIUM SERPL-SCNC: 4.1 MMOL/L
PROT SERPL-MCNC: 7.2 G/DL
RBC # BLD AUTO: 4.68 M/UL
SODIUM SERPL-SCNC: 140 MMOL/L
TSH SERPL DL<=0.005 MIU/L-ACNC: 2.66 UIU/ML
WBC # BLD AUTO: 7.95 K/UL

## 2018-08-31 PROCEDURE — 80053 COMPREHEN METABOLIC PANEL: CPT

## 2018-08-31 PROCEDURE — 83036 HEMOGLOBIN GLYCOSYLATED A1C: CPT

## 2018-08-31 PROCEDURE — 36415 COLL VENOUS BLD VENIPUNCTURE: CPT

## 2018-08-31 PROCEDURE — 83735 ASSAY OF MAGNESIUM: CPT

## 2018-08-31 PROCEDURE — 83880 ASSAY OF NATRIURETIC PEPTIDE: CPT

## 2018-08-31 PROCEDURE — 82306 VITAMIN D 25 HYDROXY: CPT

## 2018-08-31 PROCEDURE — 85027 COMPLETE CBC AUTOMATED: CPT

## 2018-08-31 PROCEDURE — 84443 ASSAY THYROID STIM HORMONE: CPT

## 2018-08-31 RX ORDER — ERGOCALCIFEROL 1.25 MG/1
50000 CAPSULE ORAL WEEKLY
Qty: 12 CAPSULE | Refills: 3 | Status: SHIPPED | OUTPATIENT
Start: 2018-08-31 | End: 2019-02-06

## 2018-08-31 NOTE — TELEPHONE ENCOUNTER
A new order for TSH was placed with appropriate association. Please schedule the patient a fasting lab appointment; he needs all the labs ordered 4/14/18 and the TSH ordered today to be done.

## 2018-09-04 ENCOUNTER — TELEPHONE (OUTPATIENT)
Dept: ORTHOPEDICS | Facility: CLINIC | Age: 48
End: 2018-09-04

## 2018-09-04 NOTE — TELEPHONE ENCOUNTER
Called Pt to reschedule his appt. for today and could not get in touch with him. The pt voicemail is not set up.

## 2018-10-01 ENCOUNTER — TELEPHONE (OUTPATIENT)
Dept: ORTHOPEDICS | Facility: CLINIC | Age: 48
End: 2018-10-01

## 2018-10-01 NOTE — TELEPHONE ENCOUNTER
Called pt back regarding a knee brace. Explain to pt that Mr. Harris look into getting him a knee brace and could not find one that would fit his knee due to his weight. Pt verbally understood.

## 2018-10-09 ENCOUNTER — HOSPITAL ENCOUNTER (OUTPATIENT)
Dept: RADIOLOGY | Facility: HOSPITAL | Age: 48
Discharge: HOME | End: 2018-10-09
Attending: PHYSICIAN ASSISTANT
Payer: MEDICARE

## 2018-10-09 ENCOUNTER — OFFICE VISIT (OUTPATIENT)
Dept: INTERNAL MEDICINE | Facility: CLINIC | Age: 48
End: 2018-10-09
Payer: MEDICARE

## 2018-10-09 VITALS
SYSTOLIC BLOOD PRESSURE: 138 MMHG | BODY MASS INDEX: 50.62 KG/M2 | HEART RATE: 73 BPM | WEIGHT: 315 LBS | HEIGHT: 66 IN | DIASTOLIC BLOOD PRESSURE: 78 MMHG

## 2018-10-09 DIAGNOSIS — M79.605 LEFT LEG PAIN: ICD-10-CM

## 2018-10-09 DIAGNOSIS — E66.01 MORBID OBESITY WITH BODY MASS INDEX OF 70 AND OVER IN ADULT: Chronic | ICD-10-CM

## 2018-10-09 DIAGNOSIS — M79.605 LEFT LEG PAIN: Primary | ICD-10-CM

## 2018-10-09 DIAGNOSIS — I10 HYPERTENSION, ESSENTIAL: ICD-10-CM

## 2018-10-09 PROCEDURE — 3008F BODY MASS INDEX DOCD: CPT | Mod: CPTII,,, | Performed by: PHYSICIAN ASSISTANT

## 2018-10-09 PROCEDURE — 3075F SYST BP GE 130 - 139MM HG: CPT | Mod: CPTII,,, | Performed by: PHYSICIAN ASSISTANT

## 2018-10-09 PROCEDURE — 3078F DIAST BP <80 MM HG: CPT | Mod: CPTII,,, | Performed by: PHYSICIAN ASSISTANT

## 2018-10-09 PROCEDURE — 99213 OFFICE O/P EST LOW 20 MIN: CPT | Mod: S$PBB,,, | Performed by: PHYSICIAN ASSISTANT

## 2018-10-09 PROCEDURE — 93970 EXTREMITY STUDY: CPT | Mod: 26,,, | Performed by: RADIOLOGY

## 2018-10-09 PROCEDURE — 99215 OFFICE O/P EST HI 40 MIN: CPT | Mod: PBBFAC,25 | Performed by: PHYSICIAN ASSISTANT

## 2018-10-09 PROCEDURE — 99999 PR PBB SHADOW E&M-EST. PATIENT-LVL V: CPT | Mod: PBBFAC,,, | Performed by: PHYSICIAN ASSISTANT

## 2018-10-09 PROCEDURE — 93970 EXTREMITY STUDY: CPT | Mod: TC

## 2018-10-09 RX ORDER — IBUPROFEN 800 MG/1
TABLET ORAL
Refills: 2 | COMMUNITY
Start: 2018-09-28 | End: 2018-10-29

## 2018-10-09 NOTE — PATIENT INSTRUCTIONS
Leg Swelling in Both Legs    Swelling of the feet, ankles, and legs is called edema. It is caused by excess fluid that has collected in the tissues. Extra fluid in the body settles in the lowest part because of gravity. This is why the legs and feet are most affected.  Some of the causes for edema include:  · Disease of the heart like congestive heart failure  · Standing or sitting for long periods of time  · Infection of the feet or legs  · Blood pooling in the veins of your legs (venous insufficiency)  · Dilated veins in your lower leg (varicose veins)  · Garters or other clothing that is tight on your legs. This will cause blood to pool in your legs because the clothing limits blood flow.  · Some medicines such as hormones like birth control pills, some blood pressure medicines like calcium channel blockers (amlodipine) and steroids, some antidepressants like MAO inhibitors and tricyclics  · Menstrual periods that cause you to retain fluids  · Many types of renal disease  · Liver failure or cirrhosis  · Pregnancy, some swelling is normal, but a sudden increase in leg swelling or weight gain can be a sign of a dangerous complication of pregnancy  · Poor nutrition  · Thyroid disease  Medical treatment will depend on what is causing the swelling in your legs. Your healthcare provider may prescribe water pills (diuretics) to get rid of the extra fluid.  Home care  Follow these guidelines when caring for yourself at home:  · Don't wear clothing like garters that is tight on your legs.  · Keep your legs up while lying or sitting.  · If infection, injury, or recent surgery is causing the swelling, stay off your legs as much as possible until symptoms get better.  · If your healthcare provider says that your leg swelling is caused by venous insufficiency or varicose veins, don't sit or  one place for long periods of time. Take breaks and walk about every few hours. Brisk walking is a good exercise. It helps  circulate the blood that has collected in your leg. Talk with your provider about using support stockings to stop daytime leg swelling.  · If your provider says that heart disease is causing your leg swelling, follow a low-salt diet to stop extra fluid from staying in your body. You may also need medicine.  Follow-up care  Follow up with your healthcare provider, or as advised.  When to seek medical advice  Call your healthcare provider right away if any of these occur:  · New shortness of breath or chest pain  · Shortness of breath or chest pain that gets worse  · Swelling in both legs or ankles that gets worse  · Swelling of the abdomen  · Redness, warmth, or swelling in one leg  · Fever of 100.4ºF (38ºC) or higher, or as directed by your healthcare provider  · Yellow color to your skin or eyes  · Rapid, unexplained weight gain  · Having to sleep upright or use an increased number of pillows  Date Last Reviewed: 3/31/2016  © 8479-9682 [employer]. [address] [place] [place] [address], [place] [address]. All rights reserved. This information is not intended as a substitute for professional medical care. Always follow your healthcare professional's instructions.        Leg Swelling in a Single Leg  Swelling of the arms, feet, ankles, and legs is called edema. It is caused by extra fluid collecting in the tissues. Because of gravity, extra fluid in the body settles to the lowest part. That is why the legs and feet are most affected. You have swelling in a single leg.  Some of the causes for swelling in only a single leg include:  · Infection in the foot or leg  · Long-term problem with a vein not working well (venous insufficiency)  · Swollen, twisted vein in the leg (varicose veins)  · Insect bite or sting on the foot or leg  · Injury or recent surgery on the foot or leg  · Blood clot in a deep vein of the leg (deep vein thrombosis or DVT)  · Inflammation of the joints of the lower leg  Medical treatment will depend  on what is causing your swelling.  Home care  Follow these guidelines when caring for yourself at home:  · Dont wear tight clothing.  · Keep your legs up while lying or sitting.  · Take any medicines as directed.  · If infection, injury, or recent surgery is the cause of your swelling, stay off your legs as much as possible until your symptoms get better.  · If you have venous insufficiency or varicose veins, dont sit or  one place for long periods of time. Take breaks and walk around every few hours. Talk with your healthcare provider about wearing support stockings to help lessen swelling during the day.  · Wear compression stockings with your doctor's approval  Follow-up care  Follow up with your healthcare provider as advised.  Call 911  Call 911 if any of these occur:  · Shortness of breath or trouble breathing  · Chest pain  · Coughing up blood  · Fainting or loss of consciousness   When to seek medical advice  Call your healthcare provider right away if any of these occur:  · Increased pain, swelling, warmth, or redness of the leg, ankle, or foot  · Fever of 100.4°F (38ºC) or higher, or as directed by your healthcare provider  · Weakness or dizziness  · Shaking chills  · Drenching sweats  Date Last Reviewed: 4/11/2016  © 0839-6286 [employer]. [address] [place] [address], [place] [address]. All rights reserved. This information is not intended as a substitute for professional medical care. Always follow your healthcare professional's instructions.

## 2018-10-09 NOTE — PROGRESS NOTES
Subjective:       Patient ID: Ke Oliver is a 48 y.o. male.        Chief Complaint: Leg Pain (pain=9) and Joint Swelling    Ke Oliver is an established patient of Corey Craig MD here today for urgent care visit.    Pain in the left shin x 2 weeks but notes a little increase in swelling on the left side 2-3 days ago.  He has LE edema bilaterally chronically but feels the left is a little worse than the right.  No injury.  No chest pain or shortness of breath.      HTN: on hctz.    Vitamin d def: on vitamin d supplement.      He was previously on clindamycin for a dental problem but has completed course.           Review of Systems   Constitutional: Negative for appetite change, chills, fatigue and fever.   HENT: Negative for congestion and sore throat.    Eyes: Negative for visual disturbance.   Respiratory: Negative for cough, chest tightness and shortness of breath.    Cardiovascular: Positive for leg swelling (and pain). Negative for chest pain and palpitations.   Gastrointestinal: Negative for abdominal pain, blood in stool, constipation, diarrhea, nausea and vomiting.   Genitourinary: Negative for dysuria, frequency, hematuria and urgency.   Musculoskeletal: Negative for arthralgias and back pain.   Skin: Negative for rash.   Neurological: Negative for dizziness, syncope, weakness and headaches.   Psychiatric/Behavioral: Negative for dysphoric mood and sleep disturbance. The patient is not nervous/anxious.        Objective:      Physical Exam   Constitutional: He appears well-developed and well-nourished. No distress.   HENT:   Head: Normocephalic and atraumatic.   Right Ear: External ear normal.   Left Ear: External ear normal.   Neck: Neck supple.   Pulmonary/Chest: Effort normal and breath sounds normal.   Abdominal: Soft.   Musculoskeletal:   BLE with 1+ pitting edema to mid shin  LLE neurovascularly intact with tenderness to palpation along anterior shin, left ankle and foot with full range of  "motion and no tenderness   Neurological: He is alert.   Skin: Skin is warm and dry. He is not diaphoretic.       Assessment:       1. Left leg pain    2. Hypertension, essential    3. Morbid obesity with body mass index of 70 and over in adult        Plan:       Ke was seen today for leg pain and joint swelling.    Diagnoses and all orders for this visit:    Left leg pain  -     US Lower Extremity Veins Bilateral; Future    Hypertension, essential: stable and controlled, continue hctz    Morbid obesity with body mass index of 70 and over in adult    Check ultrasound today.    Pt has been given instructions populated from [employer] database and has verbalized understanding of the after visit summary and information contained wherein.    Follow up with a primary care provider. May go to ER for acute shortness of breath, lightheadedness, fever, or any other emergent complaints or changes in condition.    "This note will be shared with the patient"    Future Appointments   Date Time Provider Department Center   10/9/2018  2:00 PM [facility]               "

## 2018-10-18 ENCOUNTER — TELEPHONE (OUTPATIENT)
Dept: ORTHOPEDICS | Facility: CLINIC | Age: 48
End: 2018-10-18

## 2018-10-18 ENCOUNTER — TELEPHONE (OUTPATIENT)
Dept: INTERNAL MEDICINE | Facility: CLINIC | Age: 48
End: 2018-10-18

## 2018-10-18 NOTE — TELEPHONE ENCOUNTER
Called pt back regards to lower left leg pain. I scheduled pt an appt on 10/29/2018 with Mr. Harris to discuss his concerns on his left leg.

## 2018-10-18 NOTE — TELEPHONE ENCOUNTER
----- Message from Oneyda Austin sent at 10/18/2018  9:26 AM CDT -----  Contact: patient [phone]  Pt had a ultrasound of his leg last week and can't get into his patient portal to see the results. Please call pt

## 2018-10-29 ENCOUNTER — OFFICE VISIT (OUTPATIENT)
Dept: ORTHOPEDICS | Facility: CLINIC | Age: 48
End: 2018-10-29
Payer: MEDICARE

## 2018-10-29 VITALS
DIASTOLIC BLOOD PRESSURE: 81 MMHG | BODY MASS INDEX: 50.62 KG/M2 | HEIGHT: 66 IN | HEART RATE: 71 BPM | WEIGHT: 315 LBS | SYSTOLIC BLOOD PRESSURE: 142 MMHG

## 2018-10-29 DIAGNOSIS — M17.0 PRIMARY OSTEOARTHRITIS OF BOTH KNEES: Primary | ICD-10-CM

## 2018-10-29 DIAGNOSIS — E66.01 MORBID OBESITY WITH BODY MASS INDEX OF 70 AND OVER IN ADULT: ICD-10-CM

## 2018-10-29 PROCEDURE — 3077F SYST BP >= 140 MM HG: CPT | Mod: CPTII,,, | Performed by: PHYSICIAN ASSISTANT

## 2018-10-29 PROCEDURE — 99213 OFFICE O/P EST LOW 20 MIN: CPT | Mod: S$PBB,,, | Performed by: PHYSICIAN ASSISTANT

## 2018-10-29 PROCEDURE — 99999 PR PBB SHADOW E&M-EST. PATIENT-LVL III: CPT | Mod: PBBFAC,,, | Performed by: PHYSICIAN ASSISTANT

## 2018-10-29 PROCEDURE — 99213 OFFICE O/P EST LOW 20 MIN: CPT | Mod: PBBFAC | Performed by: PHYSICIAN ASSISTANT

## 2018-10-29 PROCEDURE — 3008F BODY MASS INDEX DOCD: CPT | Mod: CPTII,,, | Performed by: PHYSICIAN ASSISTANT

## 2018-10-29 PROCEDURE — 3079F DIAST BP 80-89 MM HG: CPT | Mod: CPTII,,, | Performed by: PHYSICIAN ASSISTANT

## 2018-10-29 RX ORDER — MELOXICAM 15 MG/1
15 TABLET ORAL DAILY
Qty: 30 TABLET | Refills: 1 | Status: SHIPPED | OUTPATIENT
Start: 2018-10-29 | End: 2018-11-28

## 2018-10-29 NOTE — PROGRESS NOTES
SUBJECTIVE:     Chief Complaint & History of Present Illness:  Ke Oliver is a Established patient 48 y.o. male who is seen here today with a complaint of    Chief Complaint   Patient presents with    Left Lower Leg - Pain    .  He has patient well-known to me was last seen treated the clinic for this condition 06/06/2018 for his bilateral knee pain left much more than right.  His concerns today revolved around onset of pain in the calf of the left lower leg.  Was seen by his primary care and undergone ultrasound to rule out DVT.  His pain has decreased to some degree but he still is concerned about sensations of tightness and soreness in the lower leg particularly as the day progresses.  Upon questioning he relates that the pain in the a.m. upon arising is essentially at baseline but progresses and becomes more swollen and tight throughout the course of the day.  He does still have some problems with swelling in the knee and pain with ambulation.  He had attempted to schedule with bariatric but there was some confusion as far as his needs and the plans going forward.  We will reschedule him a new appointment today  On a scale of 1-10, with 10 being worst pain imaginable, he rates this pain as 4 on good days and 5 on bad days.  he describes the pain as a sore and achy.    Review of patient's allergies indicates:  No Known Allergies      Current Outpatient Medications   Medication Sig Dispense Refill    clindamycin (CLEOCIN) 150 MG capsule       cyclobenzaprine (FLEXERIL) 5 MG tablet Take one-half, one, or two tablets every 8 hours as needed for muscle pain. 30 tablet 1    diclofenac sodium 1 % Gel Apply 2 g four times daily to affected knee for relief of pain 100 g 11    ergocalciferol (ERGOCALCIFEROL) 50,000 unit Cap Take 1 capsule (50,000 Units total) by mouth once a week. 12 capsule 3    hydroCHLOROthiazide (HYDRODIURIL) 12.5 MG Tab Take 1 tablet (12.5 mg total) by mouth once daily. 90 tablet 3     ketoconazole (NIZORAL) 2 % shampoo Apply topically twice a week. Apply to scalp, lather, let sit 2-5 minutes, then rinse 120 mL 1    ranitidine (ZANTAC) 150 MG tablet Take 1 tablet (150 mg total) by mouth 2 (two) times daily with meals. 60 tablet 11    betamethasone dipropionate (DIPROLENE) 0.05 % ointment Apply topically 2 (two) times daily. Apply to hands bid 60 g 3    fluocinonide (LIDEX) 0.05 % external solution Apply topically once daily. 60 mL 2    meloxicam (MOBIC) 15 MG tablet Take 1 tablet (15 mg total) by mouth once daily. 30 tablet 1    ropinirole (REQUIP) 1 MG tablet Take 1 tablet (1 mg total) by mouth every evening. 90 tablet 3     Current Facility-Administered Medications   Medication Dose Route Frequency Provider Last Rate Last Dose    sodium hyaluronate (EUFLEXXA) 10 mg/mL(mw 2.4 -3.6 million) Syrg 40 mg  40 mg Intra-articular Weekly Jesse Harris PA-C           Past Medical History:   Diagnosis Date    Bilateral primary osteoarthritis of knee     Hypertension     Obesity     Sleep apnea        Past Surgical History:   Procedure Laterality Date    ANKLE SURGERY  1978    ankle fracture pinnned    COLONOSCOPY N/A 8/4/2016    COLONOSCOPY;  Surgeon: Carson Mittal MD    COLONOSCOPY N/A 8/4/2016    [facility]    ESOPHAGOGASTRODUODENOSCOPY (EGD) N/A 7/10/2014    Performed by [facility]ittal [facility] at [facility] ENDO (2ND FLR)    ESOPHAGOSCOPY N/A 8/4/2016    Per[facility] at [facility] 2ND FLR    HERNIA REPAIR  1999    herniated testicle       Vital Signs (Most Recent)  Vitals:    10/29/18 0824   BP: (!) 142/81   Pulse: 71           Review of Systems:  ROS:  Constitutional: no fever or chills, Morbid obesity BMI 89.43  Eyes: no visual changes  ENT: no nasal congestion or sore throat  Respiratory: no cough or shortness of breath  Cardiovascular: no chest pain or palpitations  Gastrointestinal: no nausea or vomiting, tolerating  "diet  Genitourinary: no hematuria or dysuria  Integument/Breast: no rash or pruritis  Hematologic/Lymphatic: no easy bruising or lymphadenopathy  Musculoskeletal: no arthralgias or myalgias  Neurological: no seizures or tremors  Behavioral/Psych: no auditory or visual hallucinations  Endocrine: no heat or cold intolerance              OBJECTIVE:     PHYSICAL EXAM:  Height: 5' 6" (167.6 cm) Weight: (!) 261.4 kg (576 lb 4.5 oz), General Appearance: Well nourished, well developed, in no acute distress.  Neurological: Mood & affect are normal.  left  Knee Exam:  Knee Range of Motion:0-95 degrees flexion   Effusion:none  Condition of skin:intact moderate swelling and tension through the lower extremity from knee to foot  Distal pulses are palpable dorsalis pedis but difficult  Location of tenderness:Medial joint line   Strength:limited by pain and 5 of 5  Stability:  Lachman: stable, LCL: stable, MCL: stable, PCL: stable and posteromedial (dial): stable  Varus /Valgus stress:  normal  Nathaly:   negative/negative     right  Knee Exam:  Knee Range of Motion:0-90 degrees flexion   Effusion:none  Condition of skin:intact  Location of tenderness:Medial joint line   Strength:limited by pain and 5 of 5  Stability:  Lachman: stable to testing  Varus /Valgus stress:  normal  Nathaly:   negative/negative        Hip Examination:  normal     RADIOGRAPHS:  X-rays from previous visit reviewed by me today demonstrate moderate arthritic changes throughout both knees right more so than left with complete loss of medial joint space on the right osteophytic spurring and sclerotic changes tricompartmentally in bilateral knees    ASSESSMENT/PLAN:     Plan: We discussed with the patient at length all the different treatment options available for  the knee including anti-inflammatories, acetaminophen, rest, ice, knee strengthening exercise, occasional cortisone injections for temporary relief, Viscosupplimentation injections, arthroscopic " debridement osteotomy, and finally knee arthroplasty.   Patient instructed for the need to ice and elevate the knee as much as possible  Meloxicam 15 mg q.d. with food  Order Euflexxa for bilateral knees to begin next week  Scheduled appointment with bariatric to discuss weight reduction plans

## 2018-11-05 ENCOUNTER — OFFICE VISIT (OUTPATIENT)
Dept: ORTHOPEDICS | Facility: CLINIC | Age: 48
End: 2018-11-05
Payer: MEDICARE

## 2018-11-05 VITALS
BODY MASS INDEX: 50.62 KG/M2 | WEIGHT: 315 LBS | HEART RATE: 89 BPM | DIASTOLIC BLOOD PRESSURE: 83 MMHG | SYSTOLIC BLOOD PRESSURE: 157 MMHG | HEIGHT: 66 IN

## 2018-11-05 DIAGNOSIS — M17.0 PRIMARY OSTEOARTHRITIS OF BOTH KNEES: Primary | ICD-10-CM

## 2018-11-05 PROCEDURE — 99213 OFFICE O/P EST LOW 20 MIN: CPT | Mod: PBBFAC | Performed by: PHYSICIAN ASSISTANT

## 2018-11-05 PROCEDURE — 99499 UNLISTED E&M SERVICE: CPT | Mod: S$GLB,,, | Performed by: PHYSICIAN ASSISTANT

## 2018-11-05 PROCEDURE — 99999 PR PBB SHADOW E&M-EST. PATIENT-LVL III: CPT | Mod: PBBFAC,,, | Performed by: PHYSICIAN ASSISTANT

## 2018-11-05 PROCEDURE — 20610 DRAIN/INJ JOINT/BURSA W/O US: CPT | Mod: 50,PBBFAC | Performed by: PHYSICIAN ASSISTANT

## 2018-11-05 PROCEDURE — 20610 DRAIN/INJ JOINT/BURSA W/O US: CPT | Mod: 50,S$GLB,, | Performed by: PHYSICIAN ASSISTANT

## 2018-11-05 PROCEDURE — 20610 DRAIN/INJ JOINT/BURSA W/O US: CPT | Mod: 50

## 2018-11-05 RX ADMIN — Medication 40 MG: at 09:11

## 2018-11-05 NOTE — PROGRESS NOTES
Ke Oliver is a 48 y.o. year old his here today for his 1st Euflexxa injection for degenerative changes of his bilateral knee . he was last seen and treated in the clinic on 10/29/2018. There has been no significant change in his medical status since his last visit. No Fever, chills, malaise, or unexplained weight change.      Allergies, Medications, past medical and surgical history were reviewed .    Examination of the knee demonstrates  No evidence of edema, erythema , echymosis strength and range of motion are unchanged from previous visit.    The injection site was identified and the skin was prepared with a betadine solution. The  bilateral knee  joint was injected with 2 ml of Euflexxa solution under sterile technique. Ke Oliver tolerated the procedure well, he was advised to rest the knee today, ice and elevation. I may take 3 -6 weeks following the last injection to get the full benefit of the medication.  I will see him back in 1 week. Sooner if he has any problems or concerns.           .

## 2018-11-12 ENCOUNTER — OFFICE VISIT (OUTPATIENT)
Dept: ORTHOPEDICS | Facility: CLINIC | Age: 48
End: 2018-11-12
Payer: MEDICARE

## 2018-11-12 VITALS
HEART RATE: 72 BPM | BODY MASS INDEX: 50.62 KG/M2 | SYSTOLIC BLOOD PRESSURE: 151 MMHG | DIASTOLIC BLOOD PRESSURE: 84 MMHG | HEIGHT: 66 IN | WEIGHT: 315 LBS

## 2018-11-12 DIAGNOSIS — M17.0 PRIMARY OSTEOARTHRITIS OF BOTH KNEES: Primary | ICD-10-CM

## 2018-11-12 PROCEDURE — 99999 PR PBB SHADOW E&M-EST. PATIENT-LVL III: CPT | Mod: PBBFAC,,, | Performed by: PHYSICIAN ASSISTANT

## 2018-11-12 PROCEDURE — 20610 DRAIN/INJ JOINT/BURSA W/O US: CPT | Mod: 50

## 2018-11-12 PROCEDURE — 20610 DRAIN/INJ JOINT/BURSA W/O US: CPT | Mod: 50,S$GLB,, | Performed by: PHYSICIAN ASSISTANT

## 2018-11-12 PROCEDURE — 20610 DRAIN/INJ JOINT/BURSA W/O US: CPT | Mod: 50,PBBFAC | Performed by: PHYSICIAN ASSISTANT

## 2018-11-12 PROCEDURE — 99499 UNLISTED E&M SERVICE: CPT | Mod: S$GLB,,, | Performed by: PHYSICIAN ASSISTANT

## 2018-11-12 PROCEDURE — 99213 OFFICE O/P EST LOW 20 MIN: CPT | Mod: PBBFAC,25 | Performed by: PHYSICIAN ASSISTANT

## 2018-11-12 RX ADMIN — Medication 40 MG: at 08:11

## 2018-11-12 NOTE — PROGRESS NOTES
Ke Oliver is a 48 y.o. year old, he is here today for his 2nd Euflexxa injection for degenerative changes of his bilateral knee. he was last seen and treated in the clinic on 11/5/2018. There has been no significant change in his medical status since his last visit. No Fever, chills, malaise, or unexplained weight change.      Allergies, Medications, past medical and surgical history were reviewed .    Examination of the knee demonstrates  No evidence of edema, erythema , echymosis strength and range of motion are unchanged from previous visit.    The injection site was identified and the skin was prepared with a betadine solution. The  bilateral knee joint was injected with 2 ml of Euflexxa solution under sterile technique. Ke Oliver tolerated the procedure well, he was advised to rest the knee today, ice and elevation. It may take 3-6 weeks following the last injection to get the full benefit of the medication.  I will see him back in 1 week for his 3rd Euflexxa injection in bilateral knee. Sooner if he has any problems or concerns.      I have reviewed the patients notes and discussed the physical exam, documentation, diagnosis, and treatment plan with Joshua Mahmood PA-C and I am in agreement with the documentation and treatment plan.

## 2018-11-13 RX ORDER — IBUPROFEN 800 MG/1
800 TABLET ORAL 3 TIMES DAILY
COMMUNITY
End: 2018-11-13 | Stop reason: SDUPTHER

## 2018-11-13 RX ORDER — IBUPROFEN 800 MG/1
800 TABLET ORAL 3 TIMES DAILY
Qty: 90 TABLET | Refills: 0 | Status: SHIPPED | OUTPATIENT
Start: 2018-11-13 | End: 2019-02-06

## 2018-11-13 NOTE — TELEPHONE ENCOUNTER
----- Message from Monica Saeed sent at 11/13/2018  9:45 AM CST -----  Contact: [facility] [phone]  Prescription Request:     Name of medication: ibuprofen (ADVIL,MOTRIN) 800 MG tablet    Reason for request: Refill    Pharmacy: [id number] - [place] [address] [address]    Requesting 90 day supply.    Thank You

## 2018-11-20 ENCOUNTER — OFFICE VISIT (OUTPATIENT)
Dept: ORTHOPEDICS | Facility: CLINIC | Age: 48
End: 2018-11-20
Payer: MEDICARE

## 2018-11-20 VITALS — SYSTOLIC BLOOD PRESSURE: 172 MMHG | DIASTOLIC BLOOD PRESSURE: 95 MMHG | WEIGHT: 315 LBS | BODY MASS INDEX: 91.93 KG/M2

## 2018-11-20 DIAGNOSIS — M17.0 PRIMARY OSTEOARTHRITIS OF BOTH KNEES: Primary | ICD-10-CM

## 2018-11-20 PROCEDURE — 20610 DRAIN/INJ JOINT/BURSA W/O US: CPT | Mod: 50

## 2018-11-20 PROCEDURE — 99499 UNLISTED E&M SERVICE: CPT | Mod: S$GLB,,, | Performed by: PHYSICIAN ASSISTANT

## 2018-11-20 PROCEDURE — 96372 THER/PROPH/DIAG INJ SC/IM: CPT | Mod: PBBFAC | Performed by: PHYSICIAN ASSISTANT

## 2018-11-20 PROCEDURE — 20610 DRAIN/INJ JOINT/BURSA W/O US: CPT | Mod: 50,S$GLB,, | Performed by: PHYSICIAN ASSISTANT

## 2018-11-20 PROCEDURE — 99213 OFFICE O/P EST LOW 20 MIN: CPT | Mod: PBBFAC | Performed by: PHYSICIAN ASSISTANT

## 2018-11-20 PROCEDURE — 99999 PR PBB SHADOW E&M-EST. PATIENT-LVL III: CPT | Mod: PBBFAC,,, | Performed by: PHYSICIAN ASSISTANT

## 2018-11-20 PROCEDURE — 20610 DRAIN/INJ JOINT/BURSA W/O US: CPT | Mod: 50,PBBFAC | Performed by: PHYSICIAN ASSISTANT

## 2018-11-20 RX ADMIN — Medication 40 MG: at 10:11

## 2018-11-20 NOTE — LETTER
November 20, 2018      Jesse Harris PA-C  [address]  [facility] [address]           [facility]  [address]  [facility] [address]  Phone: [phone]          Patient: Ke Oliver   MR Number: [id number]   YOB: 1970   Date of Visit: 11/20/2018       Dear Jesse Harris:    Thank you for referring Ke Oliver to me for evaluation. Attached you will find relevant portions of my assessment and plan of care.    If you have questions, please do not hesitate to call me. I look forward to following Ke Oliver along with you.    Sincerely,    Rm Mahmood PA-C    Enclosure  CC:  No Recipients    If you would like to receive this communication electronically, please contact [email or web address] or [phone] to request more information on [facility] access.    For providers and/or their staff who would like to refer a patient to [facility], please contact us through our one-stop-shop provider referral line, Dom Hancock, at [phone].    If you feel you have received this communication in error or would no longer like to receive these types of communications, please e-mail [email or web address]

## 2018-11-20 NOTE — PROGRESS NOTES
Ke Oliver is a 48 y.o. year old, he is here today for his 3rd Euflexxa injection for degenerative changes of his bilateral knee. he was last seen and treated in the clinic on Visit date not found. There has been no significant change in his medical status since his last visit. No Fever, chills, malaise, or unexplained weight change.      Allergies, Medications, past medical and surgical history were reviewed .    Examination of the knee demonstrates  No evidence of edema, erythema , echymosis strength and range of motion are unchanged from previous visit.    The injection site was identified and the skin was prepared with a betadine solution. The  bilateral knee joint was injected with 2 ml of Euflexxa solution under sterile technique. Ke Oliver tolerated the procedure well, he was advised to rest the knee today, ice and elevation. It may take 3-6 weeks following the last injection to get the full benefit of the medication.  I will see him back in 4-6 months. Sooner if he has any problems or concerns.    I agree with Joshua Mahmood PA-C assessment and plan.  -Edel Phillip PA-C

## 2018-12-10 ENCOUNTER — PATIENT MESSAGE (OUTPATIENT)
Dept: BARIATRICS | Facility: CLINIC | Age: 48
End: 2018-12-10

## 2019-01-28 ENCOUNTER — HOSPITAL ENCOUNTER (EMERGENCY)
Facility: HOSPITAL | Age: 49
Discharge: HOME | End: 2019-01-28
Attending: EMERGENCY MEDICINE
Payer: MEDICARE

## 2019-01-28 VITALS
SYSTOLIC BLOOD PRESSURE: 138 MMHG | HEART RATE: 83 BPM | BODY MASS INDEX: 52.48 KG/M2 | HEIGHT: 65 IN | OXYGEN SATURATION: 96 % | TEMPERATURE: 98 F | DIASTOLIC BLOOD PRESSURE: 65 MMHG | WEIGHT: 315 LBS | RESPIRATION RATE: 16 BRPM

## 2019-01-28 DIAGNOSIS — R07.9 CHEST PAIN: ICD-10-CM

## 2019-01-28 DIAGNOSIS — R20.0 LEFT ARM NUMBNESS: Primary | ICD-10-CM

## 2019-01-28 LAB
ALBUMIN SERPL BCP-MCNC: 3.6 G/DL
ALP SERPL-CCNC: 69 U/L
ALT SERPL W/O P-5'-P-CCNC: 13 U/L
ANION GAP SERPL CALC-SCNC: 11 MMOL/L
AST SERPL-CCNC: 11 U/L
BASOPHILS # BLD AUTO: 0.04 K/UL
BASOPHILS NFR BLD: 0.4 %
BILIRUB SERPL-MCNC: 0.3 MG/DL
BUN SERPL-MCNC: 12 MG/DL
CALCIUM SERPL-MCNC: 9.6 MG/DL
CHLORIDE SERPL-SCNC: 106 MMOL/L
CO2 SERPL-SCNC: 25 MMOL/L
CREAT SERPL-MCNC: 0.9 MG/DL
DIFFERENTIAL METHOD: ABNORMAL
EOSINOPHIL # BLD AUTO: 0.1 K/UL
EOSINOPHIL NFR BLD: 1.6 %
ERYTHROCYTE [DISTWIDTH] IN BLOOD BY AUTOMATED COUNT: 13.2 %
EST. GFR  (AFRICAN AMERICAN): >60 ML/MIN/1.73 M^2
EST. GFR  (NON AFRICAN AMERICAN): >60 ML/MIN/1.73 M^2
GLUCOSE SERPL-MCNC: 92 MG/DL
HCT VFR BLD AUTO: 44.5 %
HGB BLD-MCNC: 13.6 G/DL
IMM GRANULOCYTES # BLD AUTO: 0.02 K/UL
IMM GRANULOCYTES NFR BLD AUTO: 0.2 %
LYMPHOCYTES # BLD AUTO: 2.2 K/UL
LYMPHOCYTES NFR BLD: 24.6 %
MAGNESIUM SERPL-MCNC: 2.1 MG/DL
MCH RBC QN AUTO: 28.1 PG
MCHC RBC AUTO-ENTMCNC: 30.6 G/DL
MCV RBC AUTO: 92 FL
MONOCYTES # BLD AUTO: 1 K/UL
MONOCYTES NFR BLD: 11.6 %
NEUTROPHILS # BLD AUTO: 5.5 K/UL
NEUTROPHILS NFR BLD: 61.6 %
NRBC BLD-RTO: 0 /100 WBC
PLATELET # BLD AUTO: 282 K/UL
PMV BLD AUTO: 10.7 FL
POTASSIUM SERPL-SCNC: 4.3 MMOL/L
PROT SERPL-MCNC: 7.8 G/DL
RBC # BLD AUTO: 4.84 M/UL
SODIUM SERPL-SCNC: 142 MMOL/L
TROPONIN I SERPL DL<=0.01 NG/ML-MCNC: <0.006 NG/ML
WBC # BLD AUTO: 8.99 K/UL

## 2019-01-28 PROCEDURE — 93005 ELECTROCARDIOGRAM TRACING: CPT

## 2019-01-28 PROCEDURE — 80053 COMPREHEN METABOLIC PANEL: CPT

## 2019-01-28 PROCEDURE — 93010 ELECTROCARDIOGRAM REPORT: CPT | Mod: ,,, | Performed by: INTERNAL MEDICINE

## 2019-01-28 PROCEDURE — 85025 COMPLETE CBC W/AUTO DIFF WBC: CPT

## 2019-01-28 PROCEDURE — 99284 PR EMERGENCY DEPT VISIT,LEVEL IV: ICD-10-PCS | Mod: ,,, | Performed by: EMERGENCY MEDICINE

## 2019-01-28 PROCEDURE — 84484 ASSAY OF TROPONIN QUANT: CPT

## 2019-01-28 PROCEDURE — 93010 EKG 12-LEAD: ICD-10-PCS | Mod: ,,, | Performed by: INTERNAL MEDICINE

## 2019-01-28 PROCEDURE — 99284 EMERGENCY DEPT VISIT MOD MDM: CPT | Mod: ,,, | Performed by: EMERGENCY MEDICINE

## 2019-01-28 PROCEDURE — 99285 EMERGENCY DEPT VISIT HI MDM: CPT

## 2019-01-28 PROCEDURE — 83735 ASSAY OF MAGNESIUM: CPT

## 2019-01-29 ENCOUNTER — TELEPHONE (OUTPATIENT)
Dept: INTERNAL MEDICINE | Facility: CLINIC | Age: 49
End: 2019-01-29

## 2019-01-29 NOTE — TELEPHONE ENCOUNTER
----- Message from Jessica Freeman sent at 1/29/2019  1:25 PM CST -----  Contact: [phone]  Caller is requesting a sooner appointment. Caller declined first available appointment listed below. Caller will not accept being placed on the wait list and is requesting a message be sent to the provider.    When is the next available appointment:  04/04/19  Did you offer to schedule the next available appt and put the patient on the wait list?:   yes  What visit type: EP  Symptoms:    Patient preference of timeframe to be scheduled:  Soon  What is the reason the patient is requesting a sooner appointment? (insurance terminating, changing jobs):  Pt was told on 01/28/19 by ER to see PCP.  Would you prefer an answer via [employer]?:  No  Comments:

## 2019-01-29 NOTE — ED TRIAGE NOTES
"Pt. Presents to ED today with c/o left arm tingling and numbness x1 day. States "starts with tingling in shoulder and then becomes numb and cold in wrist and fingers". Pt. Also c/o intermittent chest "discomfort" but denies pain. Denies pain or discomfort at this time. +PMS to lue. +ambulatory with slow but steady gait at baseline. Denies fevers, sob, n/v/d, other complaints.   "

## 2019-01-29 NOTE — ED PROVIDER NOTES
Encounter Date: 1/28/2019    SCRIBE #1 NOTE: I, Son Natalie, am scribing for, and in the presence of,  Dr. Duke . I have scribed the following portions of the note - Other sections scribed: HPI ROS .       History     Chief Complaint   Patient presents with    Numbness     numbness to L arm, denies weakness,     Time patient was seen by the provider: 8:53 PM      The patient is a 48 y.o. male with past medical history of HTN and bilateral primary osteoarthritis of the knees who presents with a complaint of intermittent left upper extremity numbness that stared yesterday. He also complains of an episode of left-sided chest pain at 4:00 PM. He describes this pain as a cramping feeling. It was mild and lasted for 1-2 minutes. He denies associated left upper extremity numbness and discomfort associated with this chest discomfort. He denies shortness of breath, diaphoresis, headache, lightheadedness, dizziness, nausea, vomiting, diarrhea, visual changes, neck stiffness, rashes, and lesions. He occasionally smokes cigarettes. He denies family history of cardiovascular disease.      The history is provided by the patient and medical records.     Review of patient's allergies indicates:  No Known Allergies  Past Medical History:   Diagnosis Date    Bilateral primary osteoarthritis of knee     Hypertension     Obesity     Sleep apnea      Past Surgical History:   Procedure Laterality Date    ANKLE SURGERY  1978    ankle fracture pinnned    COLONOSCOPY N/A 8/4/2016    [facility] 2ND FLR    ESOPHAGOGASTRODUODENOSCOPY (EGD) N/A 7/10/2014    Performed by [facility] ENDO (2ND FLR)    ESOPHAGOSCOPY N/A 8/4/2016    [facility] 2ND FLR    HERNIA REPAIR  1999    herniated testicle     Family History   Problem Relation Age of Onset    Cancer Mother         lung    Psoriasis Neg Hx     Eczema Neg Hx      Social History     Tobacco Use     Smoking status: Former Smoker     Last attempt to quit: 2001     Years since quittin.4    Smokeless tobacco: Never Used   Substance Use Topics    Alcohol use: No    Drug use: No     Review of Systems   Constitutional: Negative for chills, diaphoresis, fatigue and fever.   HENT: Negative for sore throat and trouble swallowing.    Eyes: Negative for visual disturbance.   Respiratory: Negative for cough and shortness of breath.    Cardiovascular: Positive for chest pain. Negative for palpitations.   Gastrointestinal: Negative for abdominal pain, diarrhea, nausea and vomiting.   Endocrine: Negative for cold intolerance and heat intolerance.   Genitourinary: Negative for difficulty urinating, dysuria and frequency.   Musculoskeletal: Negative for arthralgias, myalgias and neck stiffness.   Skin: Negative for rash.   Neurological: Positive for numbness. Negative for dizziness, syncope, weakness, light-headedness and headaches.       Physical Exam     Initial Vitals [19 1800]   BP Pulse Resp Temp SpO2   (!) 149/92 99 18 99.1 °F (37.3 °C) 98 %      MAP       --         Physical Exam    Nursing note and vitals reviewed.  Constitutional: He appears well-developed and well-nourished. He is not diaphoretic. He is Obese . No distress.   HENT:   Head: Normocephalic and atraumatic.   Mouth/Throat: Oropharynx is clear and moist.   Eyes: Conjunctivae are normal. No scleral icterus.   Neck: No JVD present.   Cardiovascular: Normal rate, regular rhythm and normal heart sounds. Exam reveals no gallop and no friction rub.    No murmur heard.  Pulses:       Radial pulses are 2+ on the right side, and 2+ on the left side.        Dorsalis pedis pulses are 2+ on the right side, and 2+ on the left side.   Pulmonary/Chest: Effort normal and breath sounds normal. No respiratory distress. He has no decreased breath sounds. He has no wheezes. He has no rhonchi. He has no rales.   Abdominal: Soft. There is no tenderness.  "  Musculoskeletal: Normal range of motion. He exhibits no edema or tenderness.   Negative bilateral calf tenderness. Negative bilateral Emily's sign.   Neurological: He is alert and oriented to person, place, and time. He has normal strength. No cranial nerve deficit or sensory deficit. Coordination and gait normal. GCS eye subscore is 4. GCS verbal subscore is 5. GCS motor subscore is 6.   Skin: Skin is warm and dry. No pallor.   Venous stasis changes to both lower legs.   Psychiatric: He has a normal mood and affect.         ED Course   Procedures  Labs Reviewed   CBC W/ AUTO DIFFERENTIAL - Abnormal; Notable for the following components:       Result Value    Hemoglobin 13.6 (*)     MCHC 30.6 (*)     All other components within normal limits   COMPREHENSIVE METABOLIC PANEL   TROPONIN I   MAGNESIUM     EKG Readings: (Independently Interpreted)   Initial Reading: No STEMI. Rhythm: Normal Sinus Rhythm. Heart Rate: 78. Ectopy: No Ectopy. Conduction: Normal. ST Segments: Normal ST Segments. T Waves: Normal. Axis: Normal.       Imaging Results          X-Ray Chest AP Portable (Final result)  Result time 01/28/19 22:28:47    Final result by Armin Franco MD (01/28/19 22:28:47)                 Impression:      Cardiomegaly with question of mild pulmonary edema, noting limitations discussed above.      Electronically signed by: Armin Franco MD  Date:    01/28/2019  Time:    22:28             Narrative:    EXAMINATION:  XR CHEST AP PORTABLE    CLINICAL HISTORY:  Provided history is "Chest Pain;  ".    TECHNIQUE:  One view of the chest.    COMPARISON:  03/05/2014.    FINDINGS:  Examination is significantly limited by underpenetration of the x-ray beam and motion blur.  Cardiac silhouette is enlarged.  Cardiac wires overlie the chest.  There are diffusely coarsened interstitial lung markings.  Mild pulmonary edema is difficult to entirely exclude.  No large volume pleural fluid.  No obvious pneumothorax.               "                 CT Head Without Contrast (Final result)  Result time 01/28/19 22:31:20    Final result by Lizet Quinn MD (01/28/19 22:31:20)                 Impression:      No CT evidence of acute intracranial abnormality.If there is clinical concern for acute ischemia, further evaluation with MRI is recommended if there are no clinical contraindications.      Electronically signed by: Lizet Quinn MD  Date:    01/28/2019  Time:    22:31             Narrative:    EXAMINATION:  CT HEAD WITHOUT CONTRAST    CLINICAL HISTORY:  Sensation loss;    TECHNIQUE:  Low dose axial images were obtained through the head.  Coronal and sagittal reformations were also performed. Contrast was not administered.    COMPARISON:  None    FINDINGS:  Examination is slightly limited by streak artifact.  There is no acute intracranial hemorrhage, hydrocephalus, midline shift or mass effect. Gray-white matter differentiation appears maintained. The basal cisterns are patent. The mastoid air cells and paranasal sinuses are clear of acute process. The visualized bones of the calvarium demonstrate no acute osseous abnormality.                                 Medical Decision Making:   History:   Old Medical Records: I decided to obtain old medical records.  Independently Interpreted Test(s):   I have ordered and independently interpreted EKG Reading(s) - see prior notes  Clinical Tests:   Lab Tests: Ordered and Reviewed  Radiological Study: Ordered and Reviewed  Medical Tests: Ordered and Reviewed  Medical decision making:  This patient was evaluated for intermittent numbness and tingling to his left upper extremity.  He also had an episode of chest discomfort or earlier today.  He had no concerning associated symptoms such as shortness of breath, diaphoresis, nausea.  EKG is normal.  Chest radiograph is negative for obvious abnormal findings.  This was a difficult study due to the patient's body habitus.  He is afebrile with stable vital  signs.  He is in no respiratory distress. He has clear breath sounds on auscultation of the lungs.  He has no tenderness, motor, or sensory deficits on extensive examination of all extremities. He has no complaints of headache, lightheadedness, or dizziness and has a normal neurological examination. CT of the brain is negative for acute intracranial processes.  On lab review, the patient has no leukocytosis, severe anemia, electrolyte anomalies, renal insufficiency.  Troponin is not elevated. Presentation and workup are not consistent with ACS or CVA. Pt stable for discharge and will follow up with his PCP.            Scribe Attestation:   Scribe #1: I performed the above scribed service and the documentation accurately describes the services I performed. I attest to the accuracy of the note.               Clinical Impression:   The primary encounter diagnosis was Left arm numbness. A diagnosis of Chest pain was also pertinent to this visit.      Disposition:   Disposition: Discharged  Condition: Stable                        Joao Duke III, MD  01/29/19 0001

## 2019-02-06 ENCOUNTER — OFFICE VISIT (OUTPATIENT)
Dept: INTERNAL MEDICINE | Facility: CLINIC | Age: 49
End: 2019-02-06
Payer: MEDICARE

## 2019-02-06 VITALS
HEIGHT: 65 IN | BODY MASS INDEX: 52.48 KG/M2 | SYSTOLIC BLOOD PRESSURE: 138 MMHG | DIASTOLIC BLOOD PRESSURE: 76 MMHG | HEART RATE: 73 BPM | WEIGHT: 315 LBS | OXYGEN SATURATION: 95 %

## 2019-02-06 DIAGNOSIS — R07.89 NON-CARDIAC CHEST PAIN: ICD-10-CM

## 2019-02-06 DIAGNOSIS — R20.2 ARM PARESTHESIA, LEFT: ICD-10-CM

## 2019-02-06 DIAGNOSIS — I10 ESSENTIAL HYPERTENSION: Primary | ICD-10-CM

## 2019-02-06 DIAGNOSIS — E66.01 MORBID OBESITY WITH BODY MASS INDEX OF 70 AND OVER IN ADULT: ICD-10-CM

## 2019-02-06 PROCEDURE — 99499 RISK ADDL DX/OHS AUDIT: ICD-10-PCS | Mod: S$GLB,,, | Performed by: NURSE PRACTITIONER

## 2019-02-06 PROCEDURE — 99213 PR OFFICE/OUTPT VISIT, EST, LEVL III, 20-29 MIN: ICD-10-PCS | Mod: S$GLB,,, | Performed by: NURSE PRACTITIONER

## 2019-02-06 PROCEDURE — 3078F DIAST BP <80 MM HG: CPT | Mod: CPTII,S$GLB,, | Performed by: NURSE PRACTITIONER

## 2019-02-06 PROCEDURE — 3075F SYST BP GE 130 - 139MM HG: CPT | Mod: CPTII,S$GLB,, | Performed by: NURSE PRACTITIONER

## 2019-02-06 PROCEDURE — 3008F BODY MASS INDEX DOCD: CPT | Mod: CPTII,S$GLB,, | Performed by: NURSE PRACTITIONER

## 2019-02-06 PROCEDURE — 99999 PR PBB SHADOW E&M-EST. PATIENT-LVL IV: CPT | Mod: PBBFAC,,, | Performed by: NURSE PRACTITIONER

## 2019-02-06 PROCEDURE — 3008F PR BODY MASS INDEX (BMI) DOCUMENTED: ICD-10-PCS | Mod: CPTII,S$GLB,, | Performed by: NURSE PRACTITIONER

## 2019-02-06 PROCEDURE — 99213 OFFICE O/P EST LOW 20 MIN: CPT | Mod: S$GLB,,, | Performed by: NURSE PRACTITIONER

## 2019-02-06 PROCEDURE — 99499 UNLISTED E&M SERVICE: CPT | Mod: S$GLB,,, | Performed by: NURSE PRACTITIONER

## 2019-02-06 PROCEDURE — 3078F PR MOST RECENT DIASTOLIC BLOOD PRESSURE < 80 MM HG: ICD-10-PCS | Mod: CPTII,S$GLB,, | Performed by: NURSE PRACTITIONER

## 2019-02-06 PROCEDURE — 99999 PR PBB SHADOW E&M-EST. PATIENT-LVL IV: ICD-10-PCS | Mod: PBBFAC,,, | Performed by: NURSE PRACTITIONER

## 2019-02-06 PROCEDURE — 3075F PR MOST RECENT SYSTOLIC BLOOD PRESS GE 130-139MM HG: ICD-10-PCS | Mod: CPTII,S$GLB,, | Performed by: NURSE PRACTITIONER

## 2019-02-06 RX ORDER — MELOXICAM 15 MG/1
15 TABLET ORAL DAILY
Refills: 1 | COMMUNITY
Start: 2018-12-13 | End: 2019-02-11 | Stop reason: ALTCHOICE

## 2019-02-06 NOTE — PROGRESS NOTES
Subjective:       Patient ID: Ke Oliver is a 48 y.o. male.    Chief Complaint: Hospital Follow Up    Pt of Dr. Craig, here for ER f/u. Pt went to ER on 1-28-19 for chest pain and left arm numbness. CXR showed mild cardiomegaly with some pulmonary edema. EKG NSR, CT head normal, cardiac markers normal. Dx with Left arm paresthesia and chest pain, non-cardiac. Told to f/u with PCP in 1 day.     Pt states no longer has numbness in arm or Chest pain. He thinks it may have been related to the way he sleeps with his arm in one position as he has a CPAP machine. He mentions he sees ortho for injections in his knees, last received in November. Was taken off of ibuprofen, put on meloxicam with zantac. He was wondering if this was a side effect of this medication. He admits not always taking the zantac. He still has left knee pain which he states prevents him from exercising.      Review of Systems   Constitutional: Negative for activity change, appetite change and unexpected weight change.   Eyes: Negative for visual disturbance.   Respiratory: Positive for apnea. Negative for cough, chest tightness and shortness of breath.         Has JUDSON on CPAP   Cardiovascular: Negative for chest pain, palpitations and leg swelling.   Gastrointestinal: Negative for abdominal distention, abdominal pain, blood in stool, constipation, diarrhea, nausea and vomiting.   Endocrine: Negative for cold intolerance, heat intolerance, polydipsia, polyphagia and polyuria.   Genitourinary: Negative for dysuria.   Musculoskeletal: Positive for arthralgias.        As documented in HPI     Skin: Negative for color change, pallor, rash and wound.   Allergic/Immunologic: Negative for environmental allergies, food allergies and immunocompromised state.   Neurological: Positive for numbness. Negative for dizziness, syncope, weakness, light-headedness and headaches.        As documented in HPI     Hematological: Negative for adenopathy. Does not  bruise/bleed easily.   Psychiatric/Behavioral: Negative for agitation, behavioral problems, sleep disturbance and suicidal ideas.         Review of patient's allergies indicates:  No Known Allergies    Current Outpatient Medications:     betamethasone dipropionate (DIPROLENE) 0.05 % ointment, Apply topically 2 (two) times daily. Apply to hands bid, Disp: 60 g, Rfl: 3    hydroCHLOROthiazide (HYDRODIURIL) 12.5 MG Tab, Take 1 tablet (12.5 mg total) by mouth once daily., Disp: 90 tablet, Rfl: 3    ketoconazole (NIZORAL) 2 % shampoo, Apply topically twice a week. Apply to scalp, lather, let sit 2-5 minutes, then rinse, Disp: 120 mL, Rfl: 1    meloxicam (MOBIC) 15 MG tablet, Take 15 mg by mouth once daily., Disp: , Rfl: 1    ranitidine (ZANTAC) 150 MG tablet, Take 1 tablet (150 mg total) by mouth 2 (two) times daily with meals., Disp: 60 tablet, Rfl: 11    Current Facility-Administered Medications:     sodium hyaluronate (EUFLEXXA) 10 mg/mL(mw 2.4 -3.6 million) Syrg 40 mg, 40 mg, Intra-articular, Weekly, Rm Mahmood PA-C, 40 mg at 11/20/18 1029    Patient Active Problem List   Diagnosis    Obstructive sleep apnea on CPAP    Essential hypertension    Morbid obesity with body mass index of 70 and over in adult    Iron deficiency anemia    Restless leg syndrome    Bilateral primary osteoarthritis of knee    Prediabetes     Past Medical History:   Diagnosis Date    Bilateral primary osteoarthritis of knee     Hypertension     Obesity     Sleep apnea      Past Surgical History:   Procedure Laterality Date    ANKLE SURGERY  1978    ankle fracture pinnned    COLONOSCOPY N/A 8/4/2016    [facility]    ESOPHAGOGASTRODUODENOSCOPY (EGD) N/A 7/10/2014    [facility] (2ND FLR)    ESOPHAGOSCOPY N/A 8/4/2016    [facility]    HERNIA REPAIR  1999    herniated testicle     Social History  "    Socioeconomic History    Marital status:      Spouse name: None    Number of children: None    Years of education: None    Highest education level: None   Social Needs    Financial resource strain: None    Food insecurity - worry: None    Food insecurity - inability: None    Transportation needs - medical: None    Transportation needs - non-medical: None   Occupational History    None   Tobacco Use    Smoking status: Former Smoker     Last attempt to quit: 2001     Years since quittin.4    Smokeless tobacco: Never Used   Substance and Sexual Activity    Alcohol use: No    Drug use: No    Sexual activity: None   Other Topics Concern    None   Social History Narrative    Wife  of kidney cancer in 2016     Family History   Problem Relation Age of Onset    Cancer Mother         lung    Psoriasis Neg Hx     Eczema Neg Hx        Objective:       Vitals:    19 0804   BP: 138/76   Pulse: 73   SpO2: 95%   Weight: (!) 257 kg (566 lb 9.3 oz)   Height: 5' 5" (1.651 m)   PainSc:   7   PainLoc: Knee       Body mass index is 94.28 kg/m².    Physical Exam   Constitutional: He is oriented to person, place, and time. He appears well-developed and well-nourished.   Morbidly obese   Neck: Normal range of motion. Neck supple. No JVD present. Carotid bruit is not present.   Cardiovascular: Normal rate, regular rhythm, normal heart sounds and intact distal pulses. Exam reveals no gallop and no friction rub.   No murmur heard.  Pulmonary/Chest: Effort normal and breath sounds normal.   Abdominal: Soft.   Obese abdomen   Musculoskeletal: Normal range of motion.   Neurological: He is alert and oriented to person, place, and time.   Skin: Skin is warm and dry. Capillary refill takes less than 2 seconds.   Psychiatric: He has a normal mood and affect. His behavior is normal. Judgment and thought content normal.   Nursing note and vitals reviewed.      Assessment:       1. Essential " hypertension    2. Non-cardiac chest pain    3. Arm paresthesia, left    4. Morbid obesity with body mass index of 70 and over in adult    5. BMI 70 and over, adult        Plan:       Ke was seen today for hospital follow up.    Diagnoses and all orders for this visit:    Essential hypertension  BP stable today    Take medications as prescribed.    Monitor BP at home, goal BP < or = 140/80, call office if consistently above this range.    Follow low salt DASH diet and exercise.    BMI reviewed.    Go to ED if Headaches, blurred vision, chest pain, or SOB occurs along with elevated readings > or = 160/90.    Non-cardiac chest pain  Resolved, no longer having    Arm paresthesia, left  Resolved, no longer having, attributed to laying with arm over head at night on CPAP machine    Morbid obesity with body mass index of 70 and over in adult  -     Ambulatory Referral to Bariatric Medicine    BMI 70 and over, adult  -     Ambulatory Referral to Bariatric Medicine    Discussed with pt that a lot of his issues, including knee pain is relative to needing to lose weight    Reschedule appt with Bariatric Medicine to discuss weight loss options    F/U Ortho regarding knee pain, due to see next month, contact that office    Refused flu vaccine    Follow-up in about 6 months (around 8/6/2019), or with Dr. Craig for f/u .

## 2019-02-06 NOTE — PATIENT INSTRUCTIONS
Reschedule appt with Bariatric Medicine to discuss weight loss options    F/U Ortho regarding knee pain, due to see next month, contact that office    Refused flu vaccine    Continue BP meds as prescribed    Take medications as prescribed.    Monitor BP at home, goal BP < or = 140/80, call office if consistently above this range.    Follow low salt DASH diet and exercise.    BMI reviewed.    Go to ED if Headaches, blurred vision, chest pain, or SOB occurs along with elevated readings > or = 160/90.    F/U in 6 months with PCP Dr. Craig

## 2019-02-11 RX ORDER — IBUPROFEN 800 MG/1
TABLET ORAL
Qty: 60 TABLET | Refills: 2 | Status: SHIPPED | OUTPATIENT
Start: 2019-02-11 | End: 2019-08-23 | Stop reason: ALTCHOICE

## 2019-05-09 ENCOUNTER — PATIENT OUTREACH (OUTPATIENT)
Dept: ADMINISTRATIVE | Facility: HOSPITAL | Age: 49
End: 2019-05-09

## 2019-05-09 DIAGNOSIS — I10 ESSENTIAL HYPERTENSION: Primary | ICD-10-CM

## 2019-05-09 NOTE — PROGRESS NOTES
Health Maintenance Due   Topic Date Due    TETANUS VACCINE  06/02/1986     Pre-visit audit complete. Attempted to update immunizations through [facility], no data present for patient in registry.

## 2019-05-23 ENCOUNTER — OFFICE VISIT (OUTPATIENT)
Dept: INTERNAL MEDICINE | Facility: CLINIC | Age: 49
End: 2019-05-23
Payer: MEDICARE

## 2019-05-23 VITALS
BODY MASS INDEX: 50.62 KG/M2 | DIASTOLIC BLOOD PRESSURE: 80 MMHG | HEIGHT: 66 IN | WEIGHT: 315 LBS | SYSTOLIC BLOOD PRESSURE: 138 MMHG | HEART RATE: 66 BPM

## 2019-05-23 DIAGNOSIS — E66.01 MORBID OBESITY WITH BODY MASS INDEX OF 70 AND OVER IN ADULT: Chronic | ICD-10-CM

## 2019-05-23 DIAGNOSIS — L21.9 SEBORRHEIC DERMATITIS OF SCALP: ICD-10-CM

## 2019-05-23 DIAGNOSIS — I10 ESSENTIAL HYPERTENSION: ICD-10-CM

## 2019-05-23 DIAGNOSIS — Z00.00 ANNUAL PHYSICAL EXAM: Primary | ICD-10-CM

## 2019-05-23 DIAGNOSIS — E55.9 VITAMIN D INSUFFICIENCY: ICD-10-CM

## 2019-05-23 DIAGNOSIS — R73.03 PREDIABETES: ICD-10-CM

## 2019-05-23 PROCEDURE — 3075F SYST BP GE 130 - 139MM HG: CPT | Mod: CPTII,S$GLB,, | Performed by: INTERNAL MEDICINE

## 2019-05-23 PROCEDURE — 99214 OFFICE O/P EST MOD 30 MIN: CPT | Mod: S$GLB,,, | Performed by: INTERNAL MEDICINE

## 2019-05-23 PROCEDURE — 3075F PR MOST RECENT SYSTOLIC BLOOD PRESS GE 130-139MM HG: ICD-10-PCS | Mod: CPTII,S$GLB,, | Performed by: INTERNAL MEDICINE

## 2019-05-23 PROCEDURE — 3079F DIAST BP 80-89 MM HG: CPT | Mod: CPTII,S$GLB,, | Performed by: INTERNAL MEDICINE

## 2019-05-23 PROCEDURE — 99999 PR PBB SHADOW E&M-EST. PATIENT-LVL III: CPT | Mod: PBBFAC,,, | Performed by: INTERNAL MEDICINE

## 2019-05-23 PROCEDURE — 99499 RISK ADDL DX/OHS AUDIT: ICD-10-PCS | Mod: S$GLB,,, | Performed by: INTERNAL MEDICINE

## 2019-05-23 PROCEDURE — 99499 UNLISTED E&M SERVICE: CPT | Mod: S$GLB,,, | Performed by: INTERNAL MEDICINE

## 2019-05-23 PROCEDURE — 99214 PR OFFICE/OUTPT VISIT, EST, LEVL IV, 30-39 MIN: ICD-10-PCS | Mod: S$GLB,,, | Performed by: INTERNAL MEDICINE

## 2019-05-23 PROCEDURE — 99999 PR PBB SHADOW E&M-EST. PATIENT-LVL III: ICD-10-PCS | Mod: PBBFAC,,, | Performed by: INTERNAL MEDICINE

## 2019-05-23 PROCEDURE — 3079F PR MOST RECENT DIASTOLIC BLOOD PRESSURE 80-89 MM HG: ICD-10-PCS | Mod: CPTII,S$GLB,, | Performed by: INTERNAL MEDICINE

## 2019-05-23 RX ORDER — KETOCONAZOLE 20 MG/ML
SHAMPOO, SUSPENSION TOPICAL
Qty: 120 ML | Refills: 3 | Status: SHIPPED | OUTPATIENT
Start: 2019-05-23 | End: 2019-08-23 | Stop reason: SDUPTHER

## 2019-05-23 RX ORDER — CHLORTHALIDONE 25 MG/1
25 TABLET ORAL DAILY
Qty: 90 TABLET | Refills: 3 | Status: ON HOLD | OUTPATIENT
Start: 2019-05-23 | End: 2019-09-29 | Stop reason: HOSPADM

## 2019-05-23 RX ORDER — TOPIRAMATE SPINKLE 25 MG/1
25 CAPSULE ORAL DAILY
Qty: 90 CAPSULE | Refills: 3 | Status: SHIPPED | OUTPATIENT
Start: 2019-05-23 | End: 2019-08-23 | Stop reason: DRUGHIGH

## 2019-05-23 NOTE — PROGRESS NOTES
Subjective:       Patient ID: Ke Oliver is a 48 y.o. male.    Chief Complaint: Follow-up    HPI    Last visit with me August 2018.  Since then seen by internal medicine and Orthopedics.  Had been seen in the emergency room in January 2019 for left arm numbness and chest pain, EKG and troponin levels normal that time.    Patient has not been going to the gym as regularly while being treated for his knee osteoarthritis.  Patient reports that he does not think he is over eating throughout the day.  His weight has not come down.    He reports swelling in the legs, left lower leg especially.  Occasionally causes some mild fissuring in the skin.  Has led to some changes in pigmentation along the shin.    Reviewed PMH, PSH, SH, FH, allergies, and medications.     Review of Systems   All other systems reviewed and are negative.      Objective:      Physical Exam   Constitutional: He is oriented to person, place, and time. No distress.   -American man whose Body mass index is 92.16 kg/m².    HENT:   Head: Atraumatic.   Right Ear: Tympanic membrane normal. No tenderness.   Left Ear: Tympanic membrane normal. No tenderness.   Mouth/Throat: Oropharynx is clear and moist. No oropharyngeal exudate.   Eyes: Pupils are equal, round, and reactive to light. Right eye exhibits no discharge. Left eye exhibits no discharge.   Neck: Normal range of motion. No thyromegaly present.   Cardiovascular: Normal rate, regular rhythm and normal heart sounds.   Pulmonary/Chest: Effort normal and breath sounds normal. No stridor. He has no wheezes. He has no rales.   Abdominal: Soft. There is no tenderness. There is no guarding.   Musculoskeletal: He exhibits edema (tense pitting edema in b/L LE). He exhibits no tenderness.   Lymphadenopathy:     He has no cervical adenopathy.   Neurological: He is alert and oriented to person, place, and time.   Skin: Skin is warm and dry.   Coarse skin in RLE along shin, some areas with faintly  "hypopigmented patches   Psychiatric: He has a normal mood and affect. His behavior is normal.   Nursing note and vitals reviewed.      Vitals:    05/23/19 0856   BP: 138/80   BP Location: Right arm   Patient Position: Sitting   BP Method: Large (Manual)   Pulse: 66   Weight: (!) 259 kg (570 lb 15.9 oz)   Height: 5' 6" (1.676 m)     Body mass index is 92.16 kg/m².    RESULTS: Reviewed labs from last 6 months    Assessment:       1. Annual physical exam    2. Morbid obesity with body mass index of 70 and over in adult    3. Vitamin D insufficiency    4. Prediabetes    5. Essential hypertension    6. Seborrheic dermatitis of scalp        Plan:   Ke was seen today for follow-up.    Diagnoses and all orders for this visit:    Annual physical exam:  Age-appropriate health screening reviewed, indicated tests ordered. Defer TDaP to future visits.    Morbid obesity with body mass index of 70 and over in adult:  Prior dx, pt does not want to pursue surgery at this time, will attempt weight loss medications and lifestyle modifications. Refer to health .  "Please start topiramate 25 mg once daily to help control appetite. If after 2 weeks there are no side effects but the weight isn't coming down, please increase to 2 pills once daily and notify the office."  -     topiramate 25 mg capsule; Take 1 capsule (25 mg total) by mouth once daily.    Vitamin D insufficiency  -     Vitamin D; Future    Prediabetes  -     Comprehensive metabolic panel; Future  -     Hemoglobin A1c; Future    Essential hypertension:  Prior dx, sufficient control currently. Given increased fluid in legs change to chlorthalidone for longer acting diuretic. Check labs 2-3 wks.  -     chlorthalidone (HYGROTEN) 25 MG Tab; Take 1 tablet (25 mg total) by mouth once daily.  -     Magnesium; Future    Seborrheic dermatitis of scalp  -     ketoconazole (NIZORAL) 2 % shampoo; Apply topically twice a week. Apply to scalp, lather, let sit 2-5 minutes, then " rinse    Follow up in about 3 months (around 8/23/2019) for follow up visit. Labs in 2-3 weeks. See how things are going with topiramate and increase dose as needed.    Corey Craig MD  Internal Medicine    Portions of this note were completed using medical dictation software. Please excuse typographical or syntax errors that were missed on review.

## 2019-05-23 NOTE — Clinical Note
Dima Chavis,Please contact this patient to set up an appointment to meet for Health Coaching. Their major goal is weight loss . Please let me know if there are other questions. Thanks!

## 2019-05-23 NOTE — PATIENT INSTRUCTIONS
Please start topiramate 25 mg once daily to help control appetite. If after 2 weeks there are no side effects but the weight isn't coming down, please increase to 2 pills once daily and notify the office.

## 2019-06-13 ENCOUNTER — LAB VISIT (OUTPATIENT)
Dept: LAB | Facility: HOSPITAL | Age: 49
End: 2019-06-13
Attending: INTERNAL MEDICINE
Payer: MEDICARE

## 2019-06-13 DIAGNOSIS — E55.9 VITAMIN D INSUFFICIENCY: ICD-10-CM

## 2019-06-13 DIAGNOSIS — R73.03 PREDIABETES: ICD-10-CM

## 2019-06-13 DIAGNOSIS — I10 ESSENTIAL HYPERTENSION: ICD-10-CM

## 2019-06-13 LAB
25(OH)D3+25(OH)D2 SERPL-MCNC: 26 NG/ML (ref 30–96)
ALBUMIN SERPL BCP-MCNC: 3.4 G/DL (ref 3.5–5.2)
ALP SERPL-CCNC: 63 U/L (ref 55–135)
ALT SERPL W/O P-5'-P-CCNC: 8 U/L (ref 10–44)
ANION GAP SERPL CALC-SCNC: 10 MMOL/L (ref 8–16)
AST SERPL-CCNC: 10 U/L (ref 10–40)
BILIRUB SERPL-MCNC: 0.3 MG/DL (ref 0.1–1)
BUN SERPL-MCNC: 12 MG/DL (ref 6–20)
CALCIUM SERPL-MCNC: 9.3 MG/DL (ref 8.7–10.5)
CHLORIDE SERPL-SCNC: 103 MMOL/L (ref 95–110)
CO2 SERPL-SCNC: 28 MMOL/L (ref 23–29)
CREAT SERPL-MCNC: 0.9 MG/DL (ref 0.5–1.4)
EST. GFR  (AFRICAN AMERICAN): >60 ML/MIN/1.73 M^2
EST. GFR  (NON AFRICAN AMERICAN): >60 ML/MIN/1.73 M^2
ESTIMATED AVG GLUCOSE: 117 MG/DL (ref 68–131)
GLUCOSE SERPL-MCNC: 93 MG/DL (ref 70–110)
HBA1C MFR BLD HPLC: 5.7 % (ref 4–5.6)
MAGNESIUM SERPL-MCNC: 2 MG/DL (ref 1.6–2.6)
POTASSIUM SERPL-SCNC: 4.1 MMOL/L (ref 3.5–5.1)
PROT SERPL-MCNC: 7.1 G/DL (ref 6–8.4)
SODIUM SERPL-SCNC: 141 MMOL/L (ref 136–145)

## 2019-06-13 PROCEDURE — 80053 COMPREHEN METABOLIC PANEL: CPT

## 2019-06-13 PROCEDURE — 83735 ASSAY OF MAGNESIUM: CPT

## 2019-06-13 PROCEDURE — 36415 COLL VENOUS BLD VENIPUNCTURE: CPT

## 2019-06-13 PROCEDURE — 83036 HEMOGLOBIN GLYCOSYLATED A1C: CPT

## 2019-06-13 PROCEDURE — 82306 VITAMIN D 25 HYDROXY: CPT

## 2019-06-18 DIAGNOSIS — K21.9 GASTROESOPHAGEAL REFLUX DISEASE, ESOPHAGITIS PRESENCE NOT SPECIFIED: Primary | ICD-10-CM

## 2019-06-20 ENCOUNTER — TELEPHONE (OUTPATIENT)
Dept: INTERNAL MEDICINE | Facility: CLINIC | Age: 49
End: 2019-06-20

## 2019-06-20 NOTE — TELEPHONE ENCOUNTER
Please advise     ----- Message from Marilin Wilcox sent at 6/20/2019 12:40 PM CDT -----  Contact: self/[phone]  Patient called in regards needing to talk with Dr Craig office about if is possible for him to get a  Handy cap paper for his car. Please call and advise. Thank you!!!

## 2019-07-09 NOTE — TELEPHONE ENCOUNTER
Please give me a copy of an application for parking handicap so I can complete. Once ready please notify the patient for pickup.

## 2019-08-23 ENCOUNTER — OFFICE VISIT (OUTPATIENT)
Dept: INTERNAL MEDICINE | Facility: CLINIC | Age: 49
End: 2019-08-23
Payer: MEDICARE

## 2019-08-23 ENCOUNTER — TELEPHONE (OUTPATIENT)
Dept: INTERNAL MEDICINE | Facility: CLINIC | Age: 49
End: 2019-08-23

## 2019-08-23 VITALS
WEIGHT: 315 LBS | DIASTOLIC BLOOD PRESSURE: 83 MMHG | HEART RATE: 78 BPM | BODY MASS INDEX: 50.62 KG/M2 | HEIGHT: 66 IN | SYSTOLIC BLOOD PRESSURE: 136 MMHG | OXYGEN SATURATION: 98 %

## 2019-08-23 DIAGNOSIS — K21.9 GASTROESOPHAGEAL REFLUX DISEASE, ESOPHAGITIS PRESENCE NOT SPECIFIED: ICD-10-CM

## 2019-08-23 DIAGNOSIS — M17.0 BILATERAL PRIMARY OSTEOARTHRITIS OF KNEE: ICD-10-CM

## 2019-08-23 DIAGNOSIS — L21.9 SEBORRHEIC DERMATITIS OF SCALP: ICD-10-CM

## 2019-08-23 DIAGNOSIS — R06.09 DOE (DYSPNEA ON EXERTION): Primary | ICD-10-CM

## 2019-08-23 DIAGNOSIS — E66.01 MORBID OBESITY WITH BODY MASS INDEX OF 70 AND OVER IN ADULT: Chronic | ICD-10-CM

## 2019-08-23 DIAGNOSIS — L03.116 CELLULITIS OF LEFT LEG: ICD-10-CM

## 2019-08-23 PROCEDURE — 99999 PR PBB SHADOW E&M-EST. PATIENT-LVL IV: CPT | Mod: PBBFAC,,, | Performed by: INTERNAL MEDICINE

## 2019-08-23 PROCEDURE — 3079F PR MOST RECENT DIASTOLIC BLOOD PRESSURE 80-89 MM HG: ICD-10-PCS | Mod: CPTII,S$GLB,, | Performed by: INTERNAL MEDICINE

## 2019-08-23 PROCEDURE — 99214 OFFICE O/P EST MOD 30 MIN: CPT | Mod: S$GLB,,, | Performed by: INTERNAL MEDICINE

## 2019-08-23 PROCEDURE — 3075F PR MOST RECENT SYSTOLIC BLOOD PRESS GE 130-139MM HG: ICD-10-PCS | Mod: CPTII,S$GLB,, | Performed by: INTERNAL MEDICINE

## 2019-08-23 PROCEDURE — 3079F DIAST BP 80-89 MM HG: CPT | Mod: CPTII,S$GLB,, | Performed by: INTERNAL MEDICINE

## 2019-08-23 PROCEDURE — 99499 RISK ADDL DX/OHS AUDIT: ICD-10-PCS | Mod: S$GLB,,, | Performed by: INTERNAL MEDICINE

## 2019-08-23 PROCEDURE — 99999 PR PBB SHADOW E&M-EST. PATIENT-LVL IV: ICD-10-PCS | Mod: PBBFAC,,, | Performed by: INTERNAL MEDICINE

## 2019-08-23 PROCEDURE — 3075F SYST BP GE 130 - 139MM HG: CPT | Mod: CPTII,S$GLB,, | Performed by: INTERNAL MEDICINE

## 2019-08-23 PROCEDURE — 3008F BODY MASS INDEX DOCD: CPT | Mod: CPTII,S$GLB,, | Performed by: INTERNAL MEDICINE

## 2019-08-23 PROCEDURE — 99214 PR OFFICE/OUTPT VISIT, EST, LEVL IV, 30-39 MIN: ICD-10-PCS | Mod: S$GLB,,, | Performed by: INTERNAL MEDICINE

## 2019-08-23 PROCEDURE — 99499 UNLISTED E&M SERVICE: CPT | Mod: S$GLB,,, | Performed by: INTERNAL MEDICINE

## 2019-08-23 PROCEDURE — 3008F PR BODY MASS INDEX (BMI) DOCUMENTED: ICD-10-PCS | Mod: CPTII,S$GLB,, | Performed by: INTERNAL MEDICINE

## 2019-08-23 RX ORDER — TOPIRAMATE 50 MG/1
50 TABLET, FILM COATED ORAL 2 TIMES DAILY
Qty: 180 TABLET | Refills: 3 | Status: SHIPPED | OUTPATIENT
Start: 2019-08-23 | End: 2019-08-23 | Stop reason: SDUPTHER

## 2019-08-23 RX ORDER — OMEPRAZOLE 40 MG/1
40 CAPSULE, DELAYED RELEASE ORAL DAILY
Qty: 90 CAPSULE | Refills: 3 | Status: SHIPPED | OUTPATIENT
Start: 2019-08-23 | End: 2020-08-18

## 2019-08-23 RX ORDER — KETOCONAZOLE 20 MG/ML
SHAMPOO, SUSPENSION TOPICAL
Qty: 120 ML | Refills: 3 | Status: SHIPPED | OUTPATIENT
Start: 2019-08-26 | End: 2021-03-08 | Stop reason: SDUPTHER

## 2019-08-23 RX ORDER — TOPIRAMATE SPINKLE 25 MG/1
25 CAPSULE ORAL DAILY
Qty: 90 CAPSULE | Refills: 3 | Status: CANCELLED | OUTPATIENT
Start: 2019-08-23 | End: 2020-08-22

## 2019-08-23 RX ORDER — ALBUTEROL SULFATE 90 UG/1
1-2 AEROSOL, METERED RESPIRATORY (INHALATION) EVERY 6 HOURS PRN
Qty: 18 G | Refills: 5 | Status: SHIPPED | OUTPATIENT
Start: 2019-08-23 | End: 2019-10-03

## 2019-08-23 RX ORDER — TOPIRAMATE 50 MG/1
50 TABLET, FILM COATED ORAL DAILY
Qty: 90 TABLET | Refills: 3 | Status: SHIPPED | OUTPATIENT
Start: 2019-08-23 | End: 2019-11-14 | Stop reason: SDUPTHER

## 2019-08-23 RX ORDER — DICLOFENAC SODIUM 75 MG/1
75 TABLET, DELAYED RELEASE ORAL 2 TIMES DAILY
Qty: 60 TABLET | Refills: 11 | Status: SHIPPED | OUTPATIENT
Start: 2019-08-23 | End: 2019-10-08 | Stop reason: SDUPTHER

## 2019-08-23 NOTE — PATIENT INSTRUCTIONS
Please use the diclofenac up to two times a day as needed. It is safe to take Tylenol along with this, but don't take Aleve or ibuprofen with diclofenac.    Please increase topiramate (Topamax) to 50 mg. You can take 2 tabs daily of your current supply. Once this runs out you can change to the new prescription for 50 mg tablets at the pharmacy.    Please hold the ranitidine (Zantac) for now and instead take omeprazole (Prilosec) once a day before breakfast. Take 20-30 minutes before you eat for the medication to get into your system.

## 2019-08-23 NOTE — TELEPHONE ENCOUNTER
Please call the patient to let him know that I made a mistake in the topiramate order; it should read to take 50 mg once a day, not twice a day. No other changes.

## 2019-08-23 NOTE — PROGRESS NOTES
Subjective:       Patient ID: Ke Oliver is a 49 y.o. male.    Chief Complaint: Follow-up    HPI    Patient reports that he was using ibuprofen for knee pain but that this was causing him to experience congestion in the chest, even with using the Zantac.  He tried a prescription of his sisters diclofenac, found that once daily use significantly improved his pain.    He continues to have some throat and chest congestion.  Also feels tightness in the chest and occasionally sharp pains in the chest.    Few weeks ago developed ulceration in the left anterior shin.  Resolved with application over-the-counter triple antibiotic ointment.    Last visit with me 3 mo ago. Started on Topamax that visit.     Continues to wear CPAP regularly. Has trouble sleeping because mind races.     Review of Systems   All other systems reviewed and are negative.      Objective:      Physical Exam   Constitutional: He is oriented to person, place, and time. No distress.   -American man whose Body mass index is 94.22 kg/m².    HENT:   Mouth/Throat: Oropharynx is clear and moist. No oropharyngeal exudate.   Neck: Normal range of motion. No thyromegaly present.   Cardiovascular: Normal rate, regular rhythm and normal heart sounds.   Pulmonary/Chest: Effort normal and breath sounds normal. No stridor. He has no wheezes. He has no rales.   Lymphadenopathy:     He has no cervical adenopathy.   Neurological: He is alert and oriented to person, place, and time.   Skin: Skin is warm and dry.   2 hypopigmented patches approx 1 cm in size in anterior mid shin on left leg   Psychiatric: His behavior is normal. Thought content normal. He exhibits a depressed mood.   Nursing note and vitals reviewed.      Vitals:    08/23/19 0919 08/23/19 0923   BP: (!) 143/82 136/83   BP Location: Right arm Left arm   Patient Position: Sitting Sitting   BP Method: Large (Manual) Large (Manual)   Pulse: 78    SpO2: 98%    Weight: (!) 264.8 kg (583 lb 12.5  "oz)    Height: 5' 6" (1.676 m)      Body mass index is 94.22 kg/m².    RESULTS: Reviewed labs from last 3 months    Assessment:       1. STALLINGS (dyspnea on exertion)    2. Gastroesophageal reflux disease, esophagitis presence not specified    3. Seborrheic dermatitis of scalp    4. Cellulitis of left leg    5. Bilateral primary osteoarthritis of knee    6. Morbid obesity with body mass index of 70 and over in adult        Plan:   Ke was seen today for follow-up.    Diagnoses and all orders for this visit:    STALLINGS (dyspnea on exertion):  Prior dx, persists. possibly restrictive disease from obesity, may also have COPD or asthma. Start albuterol PRN, if has to use frequently start daily controller.  -     albuterol (VENTOLIN HFA) 90 mcg/actuation inhaler; Inhale 1-2 puffs into the lungs every 6 (six) hours as needed for Shortness of Breath. Rescue    Gastroesophageal reflux disease, esophagitis presence not specified:  Prior diagnosis, doesn't seem to be well controlled since still with throat congestion, change from H2RA to PPI.   -     omeprazole (PRILOSEC) 40 MG capsule; Take 1 capsule (40 mg total) by mouth once daily.    Seborrheic dermatitis of scalp:  Prior diagnosis, stable, well controlled on current management. No changes at this time, will continue to monitor.   -     ketoconazole (NIZORAL) 2 % shampoo; Apply topically twice a week. Apply to scalp, lather, let sit 2-5 minutes, then rinse    Cellulitis of left leg:  New problem, has resolved. Keep legs elevated to limit fluid buildup. If recurs please notify the office.    Bilateral primary osteoarthritis of knee:  Prior dx, has some congestion with ibuprofen but the diclofenac helped more.   -     diclofenac (VOLTAREN) 75 MG EC tablet; Take 1 tablet (75 mg total) by mouth 2 (two) times daily.    Morbid obesity with body mass index of 70 and over in adult:  Increase dose to 50 mg daily, continue attempts at lifestyle modifications.  -     topiramate " (TOPAMAX) 50 MG tablet; Take 1 tablet (50 mg total) by mouth 2 (two) times daily.    Other orders  -     Cancel: topiramate 25 mg capsule; Take 1 capsule (25 mg total) by mouth once daily.  -     Cancel: ranitidine (ZANTAC) 150 MG tablet; Take 1 tablet (150 mg total) by mouth 2 (two) times daily as needed for Heartburn.    Follow up in about 3 months (around 11/23/2019) for follow up visit. Assess weight loss and see about other possible diet changes.  Corey Craig MD  Internal Medicine    Portions of this note were completed using medical dictation software. Please excuse typographical or syntax errors that were missed on review.

## 2019-09-27 ENCOUNTER — HOSPITAL ENCOUNTER (OUTPATIENT)
Facility: HOSPITAL | Age: 49
Discharge: HOME | End: 2019-09-29
Attending: EMERGENCY MEDICINE | Admitting: EMERGENCY MEDICINE
Payer: MEDICARE

## 2019-09-27 DIAGNOSIS — N18.2: ICD-10-CM

## 2019-09-27 DIAGNOSIS — I13.0: ICD-10-CM

## 2019-09-27 DIAGNOSIS — R06.02 SHORTNESS OF BREATH: ICD-10-CM

## 2019-09-27 DIAGNOSIS — E66.01 MORBID OBESITY WITH BODY MASS INDEX OF 70 AND OVER IN ADULT: Chronic | ICD-10-CM

## 2019-09-27 DIAGNOSIS — R06.02 SOB (SHORTNESS OF BREATH): Primary | ICD-10-CM

## 2019-09-27 DIAGNOSIS — R07.9 CHEST PAIN: ICD-10-CM

## 2019-09-27 DIAGNOSIS — R73.03 PREDIABETES: ICD-10-CM

## 2019-09-27 DIAGNOSIS — I50.31 ACUTE DIASTOLIC (CONGESTIVE) HEART FAILURE: ICD-10-CM

## 2019-09-27 DIAGNOSIS — G47.33 OBSTRUCTIVE SLEEP APNEA ON CPAP: ICD-10-CM

## 2019-09-27 DIAGNOSIS — I10 ESSENTIAL HYPERTENSION: ICD-10-CM

## 2019-09-27 DIAGNOSIS — M17.0 BILATERAL PRIMARY OSTEOARTHRITIS OF KNEE: ICD-10-CM

## 2019-09-27 DIAGNOSIS — I50.31: ICD-10-CM

## 2019-09-27 LAB
ALBUMIN SERPL BCP-MCNC: 3.6 G/DL (ref 3.5–5.2)
ALP SERPL-CCNC: 69 U/L (ref 55–135)
ALT SERPL W/O P-5'-P-CCNC: 15 U/L (ref 10–44)
ANION GAP SERPL CALC-SCNC: 8 MMOL/L (ref 8–16)
AST SERPL-CCNC: 8 U/L (ref 10–40)
BASOPHILS # BLD AUTO: 0.05 K/UL (ref 0–0.2)
BASOPHILS NFR BLD: 0.6 % (ref 0–1.9)
BILIRUB SERPL-MCNC: 0.4 MG/DL (ref 0.1–1)
BNP SERPL-MCNC: <10 PG/ML (ref 0–99)
BUN SERPL-MCNC: 12 MG/DL (ref 6–20)
CALCIUM SERPL-MCNC: 9.1 MG/DL (ref 8.7–10.5)
CHLORIDE SERPL-SCNC: 105 MMOL/L (ref 95–110)
CO2 SERPL-SCNC: 26 MMOL/L (ref 23–29)
CREAT SERPL-MCNC: 1 MG/DL (ref 0.5–1.4)
DIFFERENTIAL METHOD: ABNORMAL
EOSINOPHIL # BLD AUTO: 0.5 K/UL (ref 0–0.5)
EOSINOPHIL NFR BLD: 5.4 % (ref 0–8)
ERYTHROCYTE [DISTWIDTH] IN BLOOD BY AUTOMATED COUNT: 13.7 % (ref 11.5–14.5)
EST. GFR  (AFRICAN AMERICAN): >60 ML/MIN/1.73 M^2
EST. GFR  (NON AFRICAN AMERICAN): >60 ML/MIN/1.73 M^2
GLUCOSE SERPL-MCNC: 103 MG/DL (ref 70–110)
HCT VFR BLD AUTO: 42 % (ref 40–54)
HGB BLD-MCNC: 13.2 G/DL (ref 14–18)
IMM GRANULOCYTES # BLD AUTO: 0.03 K/UL (ref 0–0.04)
IMM GRANULOCYTES NFR BLD AUTO: 0.3 % (ref 0–0.5)
INR PPP: 1 (ref 0.8–1.2)
LYMPHOCYTES # BLD AUTO: 1.7 K/UL (ref 1–4.8)
LYMPHOCYTES NFR BLD: 20 % (ref 18–48)
MCH RBC QN AUTO: 27.8 PG (ref 27–31)
MCHC RBC AUTO-ENTMCNC: 31.4 G/DL (ref 32–36)
MCV RBC AUTO: 89 FL (ref 82–98)
MONOCYTES # BLD AUTO: 0.8 K/UL (ref 0.3–1)
MONOCYTES NFR BLD: 9.4 % (ref 4–15)
NEUTROPHILS # BLD AUTO: 5.5 K/UL (ref 1.8–7.7)
NEUTROPHILS NFR BLD: 64.3 % (ref 38–73)
NRBC BLD-RTO: 0 /100 WBC
PLATELET # BLD AUTO: 272 K/UL (ref 150–350)
PMV BLD AUTO: 10.1 FL (ref 9.2–12.9)
POTASSIUM SERPL-SCNC: 3.8 MMOL/L (ref 3.5–5.1)
PROT SERPL-MCNC: 7.3 G/DL (ref 6–8.4)
PROTHROMBIN TIME: 10.4 SEC (ref 9–12.5)
RBC # BLD AUTO: 4.74 M/UL (ref 4.6–6.2)
SODIUM SERPL-SCNC: 139 MMOL/L (ref 136–145)
TROPONIN I SERPL DL<=0.01 NG/ML-MCNC: <0.006 NG/ML (ref 0–0.03)
WBC # BLD AUTO: 8.63 K/UL (ref 3.9–12.7)

## 2019-09-27 PROCEDURE — 63600175 PHARM REV CODE 636 W HCPCS: Performed by: HOSPITALIST

## 2019-09-27 PROCEDURE — 93010 ELECTROCARDIOGRAM REPORT: CPT | Mod: ,,, | Performed by: INTERNAL MEDICINE

## 2019-09-27 PROCEDURE — 96375 TX/PRO/DX INJ NEW DRUG ADDON: CPT | Mod: 59

## 2019-09-27 PROCEDURE — 94660 CPAP INITIATION&MGMT: CPT

## 2019-09-27 PROCEDURE — 80053 COMPREHEN METABOLIC PANEL: CPT

## 2019-09-27 PROCEDURE — 96374 THER/PROPH/DIAG INJ IV PUSH: CPT

## 2019-09-27 PROCEDURE — 25000242 PHARM REV CODE 250 ALT 637 W/ HCPCS: Performed by: EMERGENCY MEDICINE

## 2019-09-27 PROCEDURE — 27000190 HC CPAP FULL FACE MASK W/VALVE

## 2019-09-27 PROCEDURE — 63600175 PHARM REV CODE 636 W HCPCS: Performed by: EMERGENCY MEDICINE

## 2019-09-27 PROCEDURE — 83880 ASSAY OF NATRIURETIC PEPTIDE: CPT

## 2019-09-27 PROCEDURE — 99285 EMERGENCY DEPT VISIT HI MDM: CPT | Mod: ,,, | Performed by: EMERGENCY MEDICINE

## 2019-09-27 PROCEDURE — G0378 HOSPITAL OBSERVATION PER HR: HCPCS

## 2019-09-27 PROCEDURE — 99220 PR INITIAL OBSERVATION CARE,LEVL III: CPT | Mod: ,,, | Performed by: HOSPITALIST

## 2019-09-27 PROCEDURE — 94640 AIRWAY INHALATION TREATMENT: CPT

## 2019-09-27 PROCEDURE — 85025 COMPLETE CBC W/AUTO DIFF WBC: CPT

## 2019-09-27 PROCEDURE — 99285 EMERGENCY DEPT VISIT HI MDM: CPT | Mod: 25

## 2019-09-27 PROCEDURE — 99285 PR EMERGENCY DEPT VISIT,LEVEL V: ICD-10-PCS | Mod: ,,, | Performed by: EMERGENCY MEDICINE

## 2019-09-27 PROCEDURE — 94761 N-INVAS EAR/PLS OXIMETRY MLT: CPT

## 2019-09-27 PROCEDURE — 99220 PR INITIAL OBSERVATION CARE,LEVL III: ICD-10-PCS | Mod: ,,, | Performed by: HOSPITALIST

## 2019-09-27 PROCEDURE — 25000242 PHARM REV CODE 250 ALT 637 W/ HCPCS: Performed by: HOSPITALIST

## 2019-09-27 PROCEDURE — 84484 ASSAY OF TROPONIN QUANT: CPT

## 2019-09-27 PROCEDURE — 93010 EKG 12-LEAD: ICD-10-PCS | Mod: ,,, | Performed by: INTERNAL MEDICINE

## 2019-09-27 PROCEDURE — 99900035 HC TECH TIME PER 15 MIN (STAT)

## 2019-09-27 PROCEDURE — 85610 PROTHROMBIN TIME: CPT

## 2019-09-27 PROCEDURE — 93005 ELECTROCARDIOGRAM TRACING: CPT

## 2019-09-27 RX ORDER — TOPIRAMATE 25 MG/1
50 TABLET ORAL DAILY
Status: DISCONTINUED | OUTPATIENT
Start: 2019-09-28 | End: 2019-09-29 | Stop reason: HOSPADM

## 2019-09-27 RX ORDER — AMOXICILLIN 250 MG
1 CAPSULE ORAL 2 TIMES DAILY
Status: DISCONTINUED | OUTPATIENT
Start: 2019-09-27 | End: 2019-09-29 | Stop reason: HOSPADM

## 2019-09-27 RX ORDER — ACETAMINOPHEN 500 MG
1000 TABLET ORAL EVERY 8 HOURS PRN
Status: DISCONTINUED | OUTPATIENT
Start: 2019-09-27 | End: 2019-09-29 | Stop reason: HOSPADM

## 2019-09-27 RX ORDER — METHYLPREDNISOLONE SOD SUCC 125 MG
125 VIAL (EA) INJECTION
Status: COMPLETED | OUTPATIENT
Start: 2019-09-27 | End: 2019-09-27

## 2019-09-27 RX ORDER — POLYETHYLENE GLYCOL 3350 17 G/17G
17 POWDER, FOR SOLUTION ORAL DAILY
Status: DISCONTINUED | OUTPATIENT
Start: 2019-09-28 | End: 2019-09-29 | Stop reason: HOSPADM

## 2019-09-27 RX ORDER — DOXYCYCLINE 100 MG/1
100 CAPSULE ORAL 2 TIMES DAILY
Refills: 0 | Status: ON HOLD | COMMUNITY
Start: 2019-09-18 | End: 2019-09-29 | Stop reason: HOSPADM

## 2019-09-27 RX ORDER — SODIUM CHLORIDE 0.9 % (FLUSH) 0.9 %
10 SYRINGE (ML) INJECTION
Status: DISCONTINUED | OUTPATIENT
Start: 2019-09-27 | End: 2019-09-29 | Stop reason: HOSPADM

## 2019-09-27 RX ORDER — PROCHLORPERAZINE EDISYLATE 5 MG/ML
5 INJECTION INTRAMUSCULAR; INTRAVENOUS EVERY 6 HOURS PRN
Status: DISCONTINUED | OUTPATIENT
Start: 2019-09-27 | End: 2019-09-29 | Stop reason: HOSPADM

## 2019-09-27 RX ORDER — ACETAMINOPHEN 325 MG/1
650 TABLET ORAL EVERY 4 HOURS PRN
Status: DISCONTINUED | OUTPATIENT
Start: 2019-09-27 | End: 2019-09-29 | Stop reason: HOSPADM

## 2019-09-27 RX ORDER — FUROSEMIDE 10 MG/ML
40 INJECTION INTRAMUSCULAR; INTRAVENOUS 2 TIMES DAILY
Status: DISCONTINUED | OUTPATIENT
Start: 2019-09-27 | End: 2019-09-29

## 2019-09-27 RX ORDER — MICONAZOLE NITRATE 2 %
POWDER (GRAM) TOPICAL 2 TIMES DAILY
Status: DISCONTINUED | OUTPATIENT
Start: 2019-09-27 | End: 2019-09-29 | Stop reason: HOSPADM

## 2019-09-27 RX ORDER — IPRATROPIUM BROMIDE AND ALBUTEROL SULFATE 2.5; .5 MG/3ML; MG/3ML
3 SOLUTION RESPIRATORY (INHALATION)
Status: DISCONTINUED | OUTPATIENT
Start: 2019-09-27 | End: 2019-09-29 | Stop reason: HOSPADM

## 2019-09-27 RX ORDER — FUROSEMIDE 10 MG/ML
40 INJECTION INTRAMUSCULAR; INTRAVENOUS
Status: COMPLETED | OUTPATIENT
Start: 2019-09-27 | End: 2019-09-27

## 2019-09-27 RX ORDER — IPRATROPIUM BROMIDE AND ALBUTEROL SULFATE 2.5; .5 MG/3ML; MG/3ML
3 SOLUTION RESPIRATORY (INHALATION)
Status: COMPLETED | OUTPATIENT
Start: 2019-09-27 | End: 2019-09-27

## 2019-09-27 RX ORDER — ONDANSETRON 2 MG/ML
4 INJECTION INTRAMUSCULAR; INTRAVENOUS EVERY 8 HOURS PRN
Status: DISCONTINUED | OUTPATIENT
Start: 2019-09-27 | End: 2019-09-29 | Stop reason: HOSPADM

## 2019-09-27 RX ORDER — PREDNISONE 20 MG/1
40 TABLET ORAL DAILY
Refills: 0 | Status: ON HOLD | COMMUNITY
Start: 2019-09-18 | End: 2019-09-29 | Stop reason: HOSPADM

## 2019-09-27 RX ORDER — SODIUM CHLORIDE 0.9 % (FLUSH) 0.9 %
3 SYRINGE (ML) INJECTION EVERY 6 HOURS PRN
Status: DISCONTINUED | OUTPATIENT
Start: 2019-09-27 | End: 2019-09-29 | Stop reason: HOSPADM

## 2019-09-27 RX ORDER — PANTOPRAZOLE SODIUM 40 MG/1
40 TABLET, DELAYED RELEASE ORAL
Status: DISCONTINUED | OUTPATIENT
Start: 2019-09-28 | End: 2019-09-29 | Stop reason: HOSPADM

## 2019-09-27 RX ORDER — ENOXAPARIN SODIUM 100 MG/ML
40 INJECTION SUBCUTANEOUS EVERY 24 HOURS
Status: DISCONTINUED | OUTPATIENT
Start: 2019-09-27 | End: 2019-09-29 | Stop reason: HOSPADM

## 2019-09-27 RX ADMIN — IPRATROPIUM BROMIDE AND ALBUTEROL SULFATE 3 ML: .5; 3 SOLUTION RESPIRATORY (INHALATION) at 09:09

## 2019-09-27 RX ADMIN — ENOXAPARIN SODIUM 40 MG: 100 INJECTION SUBCUTANEOUS at 06:09

## 2019-09-27 RX ADMIN — IPRATROPIUM BROMIDE AND ALBUTEROL SULFATE 3 ML: .5; 3 SOLUTION RESPIRATORY (INHALATION) at 07:09

## 2019-09-27 RX ADMIN — METHYLPREDNISOLONE SODIUM SUCCINATE 125 MG: 125 INJECTION, POWDER, FOR SOLUTION INTRAMUSCULAR; INTRAVENOUS at 09:09

## 2019-09-27 RX ADMIN — FUROSEMIDE 40 MG: 10 INJECTION, SOLUTION INTRAMUSCULAR; INTRAVENOUS at 06:09

## 2019-09-27 RX ADMIN — FUROSEMIDE 40 MG: 10 INJECTION, SOLUTION INTRAMUSCULAR; INTRAVENOUS at 09:09

## 2019-09-27 NOTE — ED NOTES
Patient identifiers verified and correct for Ke Oliver  LOC: The patient is awake, alert and aware of environment with an appropriate affect, the patient is oriented x 3 and speaking appropriately.   APPEARANCE: Patient appears comfortable and in no acute distress, patient is clean and well groomed.  SKIN: The skin is warm and dry, color consistent with ethnicity, patient has normal skin turgor and moist mucus membranes, skin intact, no breakdown or bruising noted.   MUSCULOSKELETAL: Patient moving all extremities spontaneously, swelling noted to the lower extremities  RESPIRATORY: Airway is open and patent, respirations are spontaneous, patient has a normal effort and rate, no accessory muscle use noted, Pt has audible wheezes, pt placed on continuous pulse ox with O2 sats noted at 97% on room air.  CARDIAC: Pt placed on cardiac monitor. Patient has a normal rate and regular rhythm, no edema noted, capillary refill < 3 seconds.   GASTRO: Soft and non tender to palpation, no distention noted, normoactive bowel sounds present in all four quadrants. Pt states bowel movements have been regular.  : Pt denies any pain or frequency with urination.  NEURO: Pt opens eyes spontaneously, behavior appropriate to situation, follows commands, facial expression symmetrical, bilateral hand grasp equal and even, purposeful motor response noted, normal sensation in all extremities when touched with a finger.

## 2019-09-27 NOTE — ED NOTES
Pt awake and alert; sitting quietly on side of stretcher.  Pt remains on continuous cardiac and pulse ox monitoring with non-invasive blood pressure to cycle every 60 minutes.  VS stable; NSR noted. Pt denies pain at this time; no acute distress or discomfort reported or observed.  Pt denies restroom needs at this time; is able to reposition self on stretcher and ambulate to bathroom. Bed locked in lowest position; side rails up and locked x 1; call light, bedside table, and personal belongings within reach. Room assessed for safety measures and cleanliness; no action needed at this time. Pt updated on plan for admission; awaiting bed assignment.  Pt instructed to alert nurse for assistance and before attempting to get out of bed; verbalizes understanding. Pt denies needs or complaints at this time; will continue to monitor.

## 2019-09-27 NOTE — ED PROVIDER NOTES
Encounter Date: 2019       History     Chief Complaint   Patient presents with    Wheezing     coughing up milky white     49-year-old male history of JUDSON, obesity, hypertension complaining of increasing shortness of breath times 2-3 weeks, worsening, with any exertion, associated with a sensation of wheezing in his chest.  Positive increased lower extremity edema left greater than right.  He said he was seen in urgent care last week and prescribed antibiotics, steroids, and albuterol which she has taken without any real improvement in his symptoms. Of note he was seen on  by his PCP for similar symptoms.    The history is provided by the patient.     Review of patient's allergies indicates:  No Known Allergies  Past Medical History:   Diagnosis Date    Bilateral primary osteoarthritis of knee     Hypertension     Obesity     Sleep apnea      Past Surgical History:   Procedure Laterality Date    ANKLE SURGERY      ankle fracture pinnned    COLONOSCOPY N/A 2016    COLONOSCOPY;  Surgeon: Carson Mittal MD    HERNIA REPAIR      herniated testicle     Family History   Problem Relation Age of Onset    Cancer Mother         lung    Psoriasis Neg Hx     Eczema Neg Hx      Social History     Tobacco Use    Smoking status: Former Smoker     Last attempt to quit: 2001     Years since quittin.1    Smokeless tobacco: Never Used   Substance Use Topics    Alcohol use: No    Drug use: No     Review of Systems   Constitutional: Negative for chills and fever.   Respiratory: Positive for cough, shortness of breath and wheezing.    Cardiovascular: Positive for leg swelling. Negative for chest pain and palpitations.   All other systems reviewed and are negative.      Physical Exam     Initial Vitals [19 0816]   BP Pulse Resp Temp SpO2   (!) 169/93 74 20 98.6 °F (37 °C) 98 %      MAP       --         Physical Exam    Nursing note and vitals reviewed.  Constitutional: He  appears well-developed and well-nourished. He is not diaphoretic. No distress.   Morbid obesity   Eyes: Conjunctivae are normal.   Neck: Neck supple.   Cardiovascular: Normal rate and regular rhythm.   No murmur heard.  Pulmonary/Chest: No respiratory distress. He has wheezes.   Abdominal: He exhibits no distension. There is no tenderness. There is no rebound and no guarding.   Musculoskeletal: He exhibits edema.   Skin: Skin is warm and dry. No pallor.   Psychiatric: He has a normal mood and affect. Thought content normal.         ED Course   Procedures  Labs Reviewed   CBC W/ AUTO DIFFERENTIAL - Abnormal; Notable for the following components:       Result Value    Hemoglobin 13.2 (*)     Mean Corpuscular Hemoglobin Conc 31.4 (*)     All other components within normal limits   COMPREHENSIVE METABOLIC PANEL - Abnormal; Notable for the following components:    AST 8 (*)     All other components within normal limits   TROPONIN I   B-TYPE NATRIURETIC PEPTIDE   PROTIME-INR        ECG Results          EKG 12-lead (In process)  Result time 09/27/19 09:29:12    In process by Interface, Lab In [facility] (09/27/19 09:29:12)                 Narrative:    Test Reason : R06.02,    Vent. Rate : 073 BPM     Atrial Rate : 073 BPM     P-R Int : 156 ms          QRS Dur : 086 ms      QT Int : 392 ms       P-R-T Axes : 043 031 -04 degrees     QTc Int : 431 ms    Normal sinus rhythm  Normal ECG  When compared with ECG of 28-JAN-2019 21:15,  No significant change was found    Referred By: System System           Confirmed By:                             Imaging Results          X-Ray Chest AP Portable (Final result)  Result time 09/27/19 08:58:06    Final result by Farshad Han MD (09/27/19 08:58:06)                 Impression:      See above      Electronically signed by: Farshad Han MD  Date:    09/27/2019  Time:    08:58             Narrative:    EXAMINATION:  XR CHEST AP PORTABLE    CLINICAL  HISTORY:  CHF;    TECHNIQUE:  Single frontal view of the chest was performed.    COMPARISON:  Prior studies dated 28 January 2019 5 March 2014 and 7 February 2014    FINDINGS:  There is added soft tissue attenuation decreasing detail and there are monitor leads and wires in place.  Allowing for this, the trachea is demonstrated be midline and both lungs appear expanded and clear without indication of significant pleural effusion.  The hilar regions also appear to be stable radiographically.                                 Medical Decision Making:   History:   Old Medical Records: I decided to obtain old medical records.  Old Records Summarized: records from clinic visits and records from previous admission(s).       <> Summary of Records: April 2018 echo with normal EF  Initial Assessment:   49-year-old male with a history of JUDSON, hypertension, obesity here with shortness of breath and diffuse wheezing on his lung exam.  His vital signs are normal stable.  Differential Diagnosis:   New onset pulmonary edema and CHF  Asthma  Suspect some degree of restrictive lung disease given patient's body habitus and possible pulmonary hypertension  Clinical Tests:   Lab Tests: Reviewed and Ordered  Radiological Study: Ordered and Reviewed  Medical Tests: Ordered and Reviewed  ED Management:  Labs including BNP  Continuous nebs  Chest x-ray  Likely admission    9:53 AM  Lab work is unremarkable including a normal BNP and a chest x-ray is being read as normal  I am still concerned that there could be an element of right-sided heart failure secondary to the patient's obesity and restrictive lung disease.  I am going to give him a trial of Lasix 40 mg IV.  I have recommended admission to him for a trial of diuresis to see if this improves his symptoms. The patient is declining this recommendation and prefers to have outpatient therapy.    1:49 PM  Initial plan for discharge home but patient given IV Lasix in the ED with significant  diuresis and reports symptomatic improvement.  He has changed his mind and now would like to be admitted for further treatment and workup.  Other:   I have discussed this case with another health care provider.       <> Summary of the Discussion: Internal medicine hospitalist                      Clinical Impression:       ICD-10-CM ICD-9-CM   1. SOB (shortness of breath) R06.02 786.05   2. Shortness of breath R06.02 786.05                                Raegan Dunaway MD  09/27/19 [id number]

## 2019-09-27 NOTE — ED NOTES
"Pt sitting quietly on side stretcher; remains on continuous cardiac and pulse ox monitoring with non-invasive blood pressure to cycle every 30 minutes.  VS stable; NSR noted. Bed locked in lowest position; side rails up and locked x ; call light, bedside table, and personal belongings within reach.  Pt updated on bed assignment and "cleaing" status; instructed to alert nurse for assistance and before attempting to get out of bed; verbalizes understanding.  Pt denies needs or complaints at this time; will continue to monitor.    "

## 2019-09-27 NOTE — PLAN OF CARE
Safety measures maintained. Bed in lowest position. Bed rails up x2. Call light within reach. Pt has no complaints at this time. Will continue to monitor.

## 2019-09-27 NOTE — MEDICAL/APP STUDENT
HISTORY & PHYSICAL  [facility] HOSP MED A      SUBJECTIVE:     Chief Complaint/Reason for Admission:   Shortness of breath    History of Present Illness:  Mr. Ke Oliver is a 49 y.o. male with a history of obstructive sleep apnea on CPAP, morbid obesity, and hypertension on Chlorthalidone who presented to the ED for shortness of breath. He notes the onset of symptoms approximately 2wks ago, has approximately 6-7 events a day, and is exacerbated by exertion.  He endorses associated symptoms of wheezing and cough with white sputum production but denies any fever, chills, syncope, or chest pain.  He states symptoms improve with the use of an albuterol nebulizer.  Pt states he has experienced episodes of shortness of breath for approximately 5yrs but had not been treated.  Approximately 2wks ago he presented to his PCP with similar symptoms and was prescribed an albuterol inhaler which he states provided very little relief.  Approximately 1wk ago he presented to an urgent care with worsening shortness of breath and was given a steroid intramuscular injection and dc'd with oral steroids, antibiotics, and an albuterol nebulizer.  He notes he felt some relief during the use of this regimen but his symptoms returned after he finished his course, prompting him to present to the ED.    He endorses he began smoking at the age of 15 at approximately 1PPD.  He states he stopped smoking in 2001 but recently over the last few months began smoking a few cigarettes a day due to stressful situations in his life.  He admits to sleeping with multiple pillows at night, has lower extremity edema, and notes changes to the physical appearance of his abdomen.  He denies any night sweats or unusual weight loss.  He denies any seasonal allergies or any recent sick contacts.    Review of Systems:  Review of Systems   Constitutional: Negative for chills, fever and weight loss.   HENT: Negative for ear discharge and hearing loss.    Eyes:  Negative for blurred vision, double vision and discharge.   Respiratory: Positive for cough, sputum production, shortness of breath and wheezing.    Cardiovascular: Positive for orthopnea and leg swelling. Negative for chest pain and palpitations.   Gastrointestinal: Positive for heartburn. Negative for abdominal pain, diarrhea, nausea and vomiting.   Genitourinary: Negative for dysuria and hematuria.   Skin:        Positive for a rash on his left leg   Neurological: Positive for headaches. Negative for dizziness and loss of consciousness.   Psychiatric/Behavioral: Negative for depression and substance abuse.       HISTORY:     Past Medical History:   Diagnosis Date    Bilateral primary osteoarthritis of knee     Hypertension     Obesity     Sleep apnea        Past Surgical History:   Procedure Laterality Date    ANKLE SURGERY  1978    ankle fracture pinnned    COLONOSCOPY N/A 8/4/2016    COLONOSCOPY;  Surgeon: Carson Mittal MD    HERNIA REPAIR  1999    herniated testicle       Family History   Problem Relation Age of Onset    Cancer Mother         lung    Psoriasis Neg Hx     Eczema Neg Hx      Social hx:  Current smoker of a few cigarettes a day.  Has a background of 1PPD for approximately 16yrs.  Occasionally drinks alcohol  Denies current recreational drug use but does admit to smoking marijuana when he was younger.    MEDICATIONS & ALLERGIES:     No current facility-administered medications on file prior to encounter.      Current Outpatient Medications on File Prior to Encounter   Medication Sig Dispense Refill    albuterol (VENTOLIN HFA) 90 mcg/actuation inhaler Inhale 1-2 puffs into the lungs every 6 (six) hours as needed for Shortness of Breath. Rescue 18 g 5    chlorthalidone (HYGROTEN) 25 MG Tab Take 1 tablet (25 mg total) by mouth once daily. 90 tablet 3    diclofenac (VOLTAREN) 75 MG EC tablet Take 1 tablet (75 mg total) by mouth 2 (two) times daily. 60 tablet 11    omeprazole  (PRILOSEC) 40 MG capsule Take 1 capsule (40 mg total) by mouth once daily. 90 capsule 3    topiramate (TOPAMAX) 50 MG tablet Take 1 tablet (50 mg total) by mouth once daily. 90 tablet 3    ketoconazole (NIZORAL) 2 % shampoo Apply topically twice a week. Apply to scalp, lather, let sit 2-5 minutes, then rinse 120 mL 3        Review of patient's allergies indicates:  No Known Allergies    OBJECTIVE:     Vital Signs:  Temp:  [98.6 °F (37 °C)] 98.6 °F (37 °C)  Pulse:  [67-77] 74  Resp:  [14-21] 16  SpO2:  [95 %-100 %] 96 %  BP: (129-179)/(67-95) 143/84  Body mass index is 94.26 kg/m².     Physical Exam:  Physical Exam   Constitutional: He is oriented to person, place, and time. He appears well-developed and well-nourished.   HENT:   Head: Normocephalic and atraumatic.   Pigmented areas noted on his tongue   Eyes: Pupils are equal, round, and reactive to light. EOM are normal. Right eye exhibits no discharge. Left eye exhibits no discharge.   Xanthelasma on both eyelids   Neck: Normal range of motion. Neck supple.   Cardiovascular: Normal rate and regular rhythm.   Pulmonary/Chest: He has wheezes.   Negative for crackles   Abdominal: Soft. Bowel sounds are normal.   Musculoskeletal:   Positive for bilateral lower extremity pitting edema 2+   Neurological: He is alert and oriented to person, place, and time.   Skin: Skin is warm and dry.   Stasis dermatitis noted on left shin   Psychiatric: He has a normal mood and affect. His behavior is normal.     Laboratory  Labs Reviewed   CBC W/ AUTO DIFFERENTIAL - Abnormal; Notable for the following components:       Result Value    Hemoglobin 13.2 (*)     Mean Corpuscular Hemoglobin Conc 31.4 (*)     All other components within normal limits   COMPREHENSIVE METABOLIC PANEL - Abnormal; Notable for the following components:    AST 8 (*)     All other components within normal limits   TROPONIN I   B-TYPE NATRIURETIC PEPTIDE   PROTIME-INR        Sodium (mmol/L)   Date Value    09/27/2019 139   06/13/2019 141   01/28/2019 142     Potassium (mmol/L)   Date Value   09/27/2019 3.8   06/13/2019 4.1   01/28/2019 4.3     Chloride (mmol/L)   Date Value   09/27/2019 105   06/13/2019 103   01/28/2019 106     CO2 (mmol/L)   Date Value   09/27/2019 26   06/13/2019 28   01/28/2019 25     BUN, Bld (mg/dL)   Date Value   09/27/2019 12   06/13/2019 12   01/28/2019 12     Creatinine (mg/dL)   Date Value   09/27/2019 1.0   06/13/2019 0.9   01/28/2019 0.9     Glucose (mg/dL)   Date Value   09/27/2019 103   06/13/2019 93   01/28/2019 92     Calcium (mg/dL)   Date Value   09/27/2019 9.1   06/13/2019 9.3   01/28/2019 9.6     Magnesium (mg/dL)   Date Value   06/13/2019 2.0   01/28/2019 2.1   08/31/2018 2.2     Alkaline Phosphatase (U/L)   Date Value   09/27/2019 69   06/13/2019 63   01/28/2019 69     ALT (U/L)   Date Value   09/27/2019 15   06/13/2019 8 (L)   01/28/2019 13     AST (U/L)   Date Value   09/27/2019 8 (L)   06/13/2019 10   01/28/2019 11     Albumin (g/dL)   Date Value   09/27/2019 3.6   06/13/2019 3.4 (L)   01/28/2019 3.6     Total Protein (g/dL)   Date Value   09/27/2019 7.3   06/13/2019 7.1   01/28/2019 7.8     Total Bilirubin (mg/dL)   Date Value   09/27/2019 0.4   06/13/2019 0.3   01/28/2019 0.3     INR (no units)   Date Value   09/27/2019 1.0       WBC (K/uL)   Date Value   09/27/2019 8.63   01/28/2019 8.99   08/31/2018 7.95     Hemoglobin (g/dL)   Date Value   09/27/2019 13.2 (L)   01/28/2019 13.6 (L)   08/31/2018 13.0 (L)     Platelets (K/uL)   Date Value   09/27/2019 272   01/28/2019 282   08/31/2018 253       Diagnostic Results:  EXAMINATION:  XR CHEST AP PORTABLE    CLINICAL HISTORY:  CHF;    TECHNIQUE:  Single frontal view of the chest was performed.    COMPARISON:  Prior studies dated 28 January 2019 5 March 2014 and 7 February 2014    FINDINGS:  There is added soft tissue attenuation decreasing detail and there are monitor leads and wires in place.  Allowing for this, the trachea is  demonstrated be midline and both lungs appear expanded and clear without indication of significant pleural effusion.  The hilar regions also appear to be stable radiographically.      Impression       See above      Electronically signed by: Farshad Han MD  Date: 09/27/2019  Time: 08:58     Narrative   Performed by: RADHA   Test Reason : R06.02,    Vent. Rate : 073 BPM     Atrial Rate : 073 BPM     P-R Int : 156 ms          QRS Dur : 086 ms      QT Int : 392 ms       P-R-T Axes : 043 031 -04 degrees     QTc Int : 431 ms    Normal sinus rhythm  Normal ECG  When compared with ECG of 28-JAN-2019 21:15,  No significant change was found  Confirmed by RACHELLE RODRIGUEZ MD ([id number]) on 9/27/2019 5:24:08 PM    Referred By: System System           Confirmed By:RACHELLE RODRIGUEZ MD       ASSESSMENT & PLAN:     ASSESSMENT:  Mr. Oliver is a 49yr old with a PMHx of obstructive sleep apnea, obesity, hypertension, and a 16+yr history of smoking who presents with shortness of breath.  His clinical picture potentially meets criteria for COPD given his acute onset of dyspnea and cough on the background of smoking and wheezing on exam.  However given his obesity and JUDSON, he may also have heart failure potentially related to compression.  Pt notes he felt better after lasix diuresis in the ED which also supports this dx.  DDx include asthma, potentially exacerbated by him recently starting smoking again, viral URI or bacterial pneumonia although less likely given his past symptoms, and NSAID induced bronchospasm given his use of Diclofenac for osteoarthritis.    PLAN:  1. Shortness of breath - will continue his nebulizer treatments.  Given his lack of symptomatic resolve with systemic steroids in the past, will hold off on giving him more.    -Will diurese him with another round of Lasix and monitor his urine output, metabolic panel, and standing weights.  -Will order echocardiogram to assess his cardiac function and EF.  -Will order a CPAP  for him to use at night  -Current oxygen saturation is within normal limits.  Will place PRN oxygen at 2L if he drops <92% on RA.  -May consult Pulmonology if symptoms persist.    2. Hypertension - elevated on exam today.  Pt notes has not take his Chlorthalidone since 9/25.  Given he will be diuresed with lasix, will hold off on his thiazide diuretic for now.  Will monitor his BP    3. Osteoarthritis - will hold off on pts Diclofenac given he will be giving lasix.    4. GERD - will continue pts omeprazole 40mgs daily.    5. Obesity - will continue pts topomax 50mgs daily.      Note this is a medical students H&P for the purpose of practice.  Please refer to the attendings A/P for further management.    Broderick Deutsch, MS3  [facility]

## 2019-09-28 LAB
ANION GAP SERPL CALC-SCNC: 11 MMOL/L (ref 8–16)
BASOPHILS # BLD AUTO: 0.02 K/UL (ref 0–0.2)
BASOPHILS NFR BLD: 0.1 % (ref 0–1.9)
BUN SERPL-MCNC: 14 MG/DL (ref 6–20)
CALCIUM SERPL-MCNC: 9.8 MG/DL (ref 8.7–10.5)
CHLORIDE SERPL-SCNC: 103 MMOL/L (ref 95–110)
CHOLEST SERPL-MCNC: 210 MG/DL (ref 120–199)
CHOLEST/HDLC SERPL: 4 {RATIO} (ref 2–5)
CO2 SERPL-SCNC: 27 MMOL/L (ref 23–29)
CREAT SERPL-MCNC: 0.9 MG/DL (ref 0.5–1.4)
DIFFERENTIAL METHOD: ABNORMAL
EOSINOPHIL # BLD AUTO: 0 K/UL (ref 0–0.5)
EOSINOPHIL NFR BLD: 0.1 % (ref 0–8)
ERYTHROCYTE [DISTWIDTH] IN BLOOD BY AUTOMATED COUNT: 13.8 % (ref 11.5–14.5)
EST. GFR  (AFRICAN AMERICAN): >60 ML/MIN/1.73 M^2
EST. GFR  (NON AFRICAN AMERICAN): >60 ML/MIN/1.73 M^2
GLUCOSE SERPL-MCNC: 109 MG/DL (ref 70–110)
HCT VFR BLD AUTO: 45.4 % (ref 40–54)
HDLC SERPL-MCNC: 53 MG/DL (ref 40–75)
HDLC SERPL: 25.2 % (ref 20–50)
HGB BLD-MCNC: 13.7 G/DL (ref 14–18)
IMM GRANULOCYTES # BLD AUTO: 0.08 K/UL (ref 0–0.04)
IMM GRANULOCYTES NFR BLD AUTO: 0.6 % (ref 0–0.5)
LDLC SERPL CALC-MCNC: 145.6 MG/DL (ref 63–159)
LYMPHOCYTES # BLD AUTO: 1.6 K/UL (ref 1–4.8)
LYMPHOCYTES NFR BLD: 12.2 % (ref 18–48)
MAGNESIUM SERPL-MCNC: 2.1 MG/DL (ref 1.6–2.6)
MCH RBC QN AUTO: 27.6 PG (ref 27–31)
MCHC RBC AUTO-ENTMCNC: 30.2 G/DL (ref 32–36)
MCV RBC AUTO: 92 FL (ref 82–98)
MONOCYTES # BLD AUTO: 1.5 K/UL (ref 0.3–1)
MONOCYTES NFR BLD: 10.9 % (ref 4–15)
NEUTROPHILS # BLD AUTO: 10.2 K/UL (ref 1.8–7.7)
NEUTROPHILS NFR BLD: 76.1 % (ref 38–73)
NONHDLC SERPL-MCNC: 157 MG/DL
NRBC BLD-RTO: 0 /100 WBC
PHOSPHATE SERPL-MCNC: 2.7 MG/DL (ref 2.7–4.5)
PLATELET # BLD AUTO: 317 K/UL (ref 150–350)
PMV BLD AUTO: 10.1 FL (ref 9.2–12.9)
POTASSIUM SERPL-SCNC: 4 MMOL/L (ref 3.5–5.1)
PROCALCITONIN SERPL IA-MCNC: 0.02 NG/ML
RBC # BLD AUTO: 4.96 M/UL (ref 4.6–6.2)
SODIUM SERPL-SCNC: 141 MMOL/L (ref 136–145)
TRIGL SERPL-MCNC: 57 MG/DL (ref 30–150)
WBC # BLD AUTO: 13.44 K/UL (ref 3.9–12.7)

## 2019-09-28 PROCEDURE — 25000242 PHARM REV CODE 250 ALT 637 W/ HCPCS: Performed by: HOSPITALIST

## 2019-09-28 PROCEDURE — 36415 COLL VENOUS BLD VENIPUNCTURE: CPT

## 2019-09-28 PROCEDURE — 25000003 PHARM REV CODE 250: Performed by: HOSPITALIST

## 2019-09-28 PROCEDURE — 99225 PR SUBSEQUENT OBSERVATION CARE,LEVEL II: ICD-10-PCS | Mod: ,,, | Performed by: HOSPITALIST

## 2019-09-28 PROCEDURE — 84100 ASSAY OF PHOSPHORUS: CPT

## 2019-09-28 PROCEDURE — 94660 CPAP INITIATION&MGMT: CPT

## 2019-09-28 PROCEDURE — 94761 N-INVAS EAR/PLS OXIMETRY MLT: CPT

## 2019-09-28 PROCEDURE — 80048 BASIC METABOLIC PNL TOTAL CA: CPT

## 2019-09-28 PROCEDURE — 63600175 PHARM REV CODE 636 W HCPCS: Performed by: HOSPITALIST

## 2019-09-28 PROCEDURE — 83735 ASSAY OF MAGNESIUM: CPT

## 2019-09-28 PROCEDURE — 99225 PR SUBSEQUENT OBSERVATION CARE,LEVEL II: CPT | Mod: ,,, | Performed by: HOSPITALIST

## 2019-09-28 PROCEDURE — 97165 OT EVAL LOW COMPLEX 30 MIN: CPT

## 2019-09-28 PROCEDURE — 99900035 HC TECH TIME PER 15 MIN (STAT)

## 2019-09-28 PROCEDURE — 80061 LIPID PANEL: CPT

## 2019-09-28 PROCEDURE — 97161 PT EVAL LOW COMPLEX 20 MIN: CPT

## 2019-09-28 PROCEDURE — 94640 AIRWAY INHALATION TREATMENT: CPT

## 2019-09-28 PROCEDURE — 85025 COMPLETE CBC W/AUTO DIFF WBC: CPT

## 2019-09-28 PROCEDURE — G0378 HOSPITAL OBSERVATION PER HR: HCPCS

## 2019-09-28 PROCEDURE — 84145 PROCALCITONIN (PCT): CPT

## 2019-09-28 RX ADMIN — TOPIRAMATE 50 MG: 25 TABLET, FILM COATED ORAL at 08:09

## 2019-09-28 RX ADMIN — IPRATROPIUM BROMIDE AND ALBUTEROL SULFATE 3 ML: .5; 3 SOLUTION RESPIRATORY (INHALATION) at 07:09

## 2019-09-28 RX ADMIN — ENOXAPARIN SODIUM 40 MG: 100 INJECTION SUBCUTANEOUS at 05:09

## 2019-09-28 RX ADMIN — IPRATROPIUM BROMIDE AND ALBUTEROL SULFATE 3 ML: .5; 3 SOLUTION RESPIRATORY (INHALATION) at 01:09

## 2019-09-28 RX ADMIN — MICONAZOLE NITRATE: 20 POWDER TOPICAL at 08:09

## 2019-09-28 RX ADMIN — FUROSEMIDE 40 MG: 10 INJECTION, SOLUTION INTRAMUSCULAR; INTRAVENOUS at 05:09

## 2019-09-28 RX ADMIN — MICONAZOLE NITRATE: 20 POWDER TOPICAL at 11:09

## 2019-09-28 RX ADMIN — IPRATROPIUM BROMIDE AND ALBUTEROL SULFATE 3 ML: .5; 3 SOLUTION RESPIRATORY (INHALATION) at 08:09

## 2019-09-28 RX ADMIN — FUROSEMIDE 40 MG: 10 INJECTION, SOLUTION INTRAMUSCULAR; INTRAVENOUS at 08:09

## 2019-09-28 RX ADMIN — PANTOPRAZOLE SODIUM 40 MG: 40 TABLET, DELAYED RELEASE ORAL at 08:09

## 2019-09-28 NOTE — PLAN OF CARE
POC reviewed with pt, verbalized an understanding; NADN; VSS; pt rested quietly through the night; no acute event/fall this shift; home cpap on; call bell in reach, bed in lowest position

## 2019-09-28 NOTE — PLAN OF CARE
Safety measures maintained. Bed in lowest position. Call light within reach. Bed rails up x2. Pt has no complaints at this time. Will continue to monitor

## 2019-09-28 NOTE — H&P
[facility] Hospital Medicine  History & Physical    Patient Name: Ke Oliver  MRN: [id number]  Admission Date: 9/27/2019  Attending Physician: Jaylan Allen MD  Primary Care Provider: Corey Craig MD    [facility] Medicine Team: [facility] HOSP MED A Jaylan Allen MD     Patient information was obtained from patient, past medical records and ER records.     Subjective:     Principal Problem:SOB (shortness of breath)    Chief Complaint:   Chief Complaint   Patient presents with    Wheezing     coughing up milky white        HPI:     48 y/o AAM hx of Morbid Obesity (BMI>90), JUDSON on CPAP, Hypertension, bilateral Knee OA, Gerd presnts to the ED with complaints of shortness of breath.  Patient has a subacute history of dyspnea with dyspnea on exertion and progression of his symptoms, he has associated increased LE pitting edema Left >Right.  He has a chronic multi-pillow orthopnea but character of orthopnea has not changed with recent symptoms.  Patient does feel change to abdominal girth how clothes fitting.     He was seen in PCP office on 8/23 (Dr. Craig) and had c/o of STALLINGS and prescribed Albuterol inhaler,   Last Wednesday he was seen in Urgent care and was prescribed Doxycycline, Prednisone 40mg x 5 days and Albuterol, he completed these medications noted only mild improvement.   In ED today patient received Duonebs, IV Methylpred and also IV Lasix.  He reports after 2nd bout of urination post-lasix felt some improvement in symptoms.     He has hypertension and takes HCTZ, reports that he doesn't take every day, last dose wed, has a home BP cuff and normally BP is in 130 systolic on forearm.   He will start the HCTZ when he feels LE swelling increased.     Past Medical History:   Diagnosis Date    Bilateral primary osteoarthritis of knee     Hypertension     Obesity     Sleep apnea        Past Surgical History:   Procedure Laterality Date    ANKLE SURGERY  1978    ankle fracture pinnned     COLONOSCOPY N/A 2016    COLONOSCOPY;  Surgeon: Carson Mittal MD    HERNIA REPAIR      herniated testicle       Review of patient's allergies indicates:  No Known Allergies    No current facility-administered medications on file prior to encounter.      Current Outpatient Medications on File Prior to Encounter   Medication Sig    albuterol (VENTOLIN HFA) 90 mcg/actuation inhaler Inhale 1-2 puffs into the lungs every 6 (six) hours as needed for Shortness of Breath. Rescue    chlorthalidone (HYGROTEN) 25 MG Tab Take 1 tablet (25 mg total) by mouth once daily.    diclofenac (VOLTAREN) 75 MG EC tablet Take 1 tablet (75 mg total) by mouth 2 (two) times daily.    omeprazole (PRILOSEC) 40 MG capsule Take 1 capsule (40 mg total) by mouth once daily.    topiramate (TOPAMAX) 50 MG tablet Take 1 tablet (50 mg total) by mouth once daily. (Patient taking differently: Take 50 mg by mouth once daily. )    ketoconazole (NIZORAL) 2 % shampoo Apply topically twice a week. Apply to scalp, lather, let sit 2-5 minutes, then rinse     Family History     Problem Relation (Age of Onset)    Cancer Mother        Tobacco Use    Smoking status: Former Smoker     Last attempt to quit: 2001     Years since quittin.1    Smokeless tobacco: Never Used   Substance and Sexual Activity    Alcohol use: No    Drug use: No    Sexual activity: Not on file     Review of Systems   Constitutional: Negative for chills, diaphoresis, fatigue and fever.   HENT: Negative for mouth sores, rhinorrhea and sore throat.    Eyes: Negative for visual disturbance.   Respiratory: Positive for wheezing. Negative for chest tightness.    Cardiovascular: Positive for leg swelling. Negative for chest pain and palpitations.   Gastrointestinal: Negative for abdominal pain, constipation, diarrhea, nausea and vomiting.   Genitourinary: Negative for dysuria, flank pain and urgency.   Musculoskeletal: Positive for arthralgias. Negative for back  pain.   Neurological: Negative for dizziness, light-headedness and numbness.   Hematological: Negative for adenopathy.   Psychiatric/Behavioral: Negative for agitation, behavioral problems and confusion.     Objective:     Vital Signs (Most Recent):  Temp: 98.3 °F (36.8 °C) (09/27/19 1909)  Pulse: 82 (09/27/19 1922)  Resp: 20 (09/27/19 1922)  BP: 137/85 (09/27/19 1909)  SpO2: 95 % (09/27/19 1922) Vital Signs (24h Range):  Temp:  [98.2 °F (36.8 °C)-98.6 °F (37 °C)] 98.3 °F (36.8 °C)  Pulse:  [67-84] 82  Resp:  [14-21] 20  SpO2:  [93 %-100 %] 95 %  BP: (129-179)/(67-95) 137/85     Weight: (!) 262.7 kg (579 lb 2.4 oz)  Body mass index is 93.48 kg/m².    Physical Exam   Constitutional: No distress.   Morbid obesity   HENT:   Mouth/Throat: Oropharynx is clear and moist. No oropharyngeal exudate.   Eyes: Pupils are equal, round, and reactive to light. Conjunctivae are normal. No scleral icterus.   Neck: Neck supple.   Appeared pt with Ext JVP near angle of clavicle   Cardiovascular: Normal rate, regular rhythm and normal heart sounds.   No murmur heard.  Distant heart sounds   Pulmonary/Chest: Effort normal. No stridor. No respiratory distress. He has no wheezes.   Decreased breath sounds due to body habitus, some expiratory wheezes heard, no accessory muscle use, speaking in full sentences   Abdominal: Soft. Bowel sounds are normal. There is no tenderness.   obese   Musculoskeletal: He exhibits edema.   Trophic skin changes to anterior shins. Pitting edema 4+ up to mid-shin Knee.    Lymphadenopathy:     He has no cervical adenopathy.   Skin: Skin is warm and dry.   lymphedematous changes of medial bilateral thighs   Vitals reviewed.        CRANIAL NERVES     CN III, IV, VI   Pupils are equal, round, and reactive to light.      Significant Labs:   CBC:   Recent Labs   Lab 09/27/19  0835   WBC 8.63   HGB 13.2*   HCT 42.0        CMP:   Recent Labs   Lab 09/27/19  0835      K 3.8      CO2 26       BUN 12   CREATININE 1.0   CALCIUM 9.1   PROT 7.3   ALBUMIN 3.6   BILITOT 0.4   ALKPHOS 69   AST 8*   ALT 15   ANIONGAP 8   EGFRNONAA >60.0     Cardiac Markers:   Recent Labs   Lab 09/27/19  0835   BNP <10     Coagulation:   Recent Labs   Lab 09/27/19  0835   INR 1.0     Lactic Acid: No results for input(s): LACTATE in the last 48 hours.    Significant Imaging: I have reviewed all pertinent imaging results/findings within the past 24 hours.  XR CHEST AP PORTABLE    CLINICAL HISTORY:  CHF;    TECHNIQUE:  Single frontal view of the chest was performed.    COMPARISON:  Prior studies dated 28 January 2019 5 March 2014 and 7 February 2014    FINDINGS:  There is added soft tissue attenuation decreasing detail and there are monitor leads and wires in place.  Allowing for this, the trachea is demonstrated be midline and both lungs appear expanded and clear without indication of significant pleural effusion.  The hilar regions also appear to be stable radiographically.      Impression       See above      Electronically signed by: Farshad Han MD       Assessment/Plan:     1. SOB_DOE_ACute Diastolic Heart Failure   -patient with progressive subacute symptoms, due to comment of increased pitting edema, change in abdominal girth/ and pts comments after receiving IV lasix, leading ddx is Acute Diastolic CHF.  [facility]  His obesity, JUDSON and HTN leading risk factors.   -Also Pulm HTN, Cor PUlmonale secondary to Restrictive lung disease possible   -BNP <10, while BNP and Obesity can have inverse relationship is very low.   -Continue 40mg IV Lasix BID   -ECHO with Color flow doppler, may have low quality but re-eval.   -Continue Nebulizers, but given recent steroid burst w/o notable improvement will withhold another round of glucocorticoids.   -Pt is afebrile and no URI or LRI symptoms, or objective data concerns for infection.     2. JUDSON   -per sleep clinic note setting 17cm H20 used during sleep  study  -CPAP 17 QHS order placed    3. Essential Htn  -hold thiazide while diuresing patient       4. Morbid Obesity   -Wound Care consult for bariatric bed  -pt reports he had a negative experience/interaction with  of Bariatric clinic at [facility] has not pursued again.   -Pt in a wellness program in recent months/years where he did have success with weight lost, but patient states his wife passed away in last 6 months and that has been a stressor resulting in not maintaining with his prior diet/eating plan.   -is on Topamax per Dr. Craig    5. Bilateral Primary OA of Knee   -uses Rx diclofenac, will hold as inpatient while on lasix and will monitor renal function closely       Code Status - FULL CODE    Active Diagnoses:    Diagnosis Date Noted POA    PRINCIPAL PROBLEM:  Acute diastolic heart failure [I50.31] 09/27/2019 Unknown    SOB (shortness of breath) [R06.02] 09/27/2019 Yes    Obstructive sleep apnea on CPAP [G47.33, Z99.89] 03/27/2014 Not Applicable    Essential hypertension [I10] 04/17/2014 Yes    Morbid obesity with body mass index of 70 and over in adult [E66.01, Z68.45] 04/17/2014 Not Applicable     Chronic    Bilateral primary osteoarthritis of knee [M17.0] 11/30/2016 Yes      Problems Resolved During this Admission:     VTE Risk Mitigation (From admission, onward)         Ordered     enoxaparin injection 40 mg  Daily      09/27/19 1748     IP VTE HIGH RISK PATIENT  Once      09/27/19 1748     Place sequential compression device  Until discontinued      09/27/19 1748     Place sequential compression device  Until discontinued      09/27/19 1748                  Jaylan Allen MD  Department of Hospital Medicine   [facility]

## 2019-09-28 NOTE — PROGRESS NOTES
[facility] Hospital Medicine  Progress Note    Patient Name: Ke Oliver  MRN: [id number]  Patient Class: OP- Observation   Admission Date: 9/27/2019  Length of Stay: 0 days  Attending Physician: Jaylan Allen MD  Primary Care Provider: Corey Craig MD    [facility] Medicine Team: [facility] A Jaylan Allen MD    Subjective:     Principal Problem:Acute diastolic heart failure    Interval History: weight is down nearly 4 kg since admission  Patient this am has 2 visitors at bedside.   He reports feeling much better, worked with therapy ambulating in hallway earlier.     He still feels that nebulizer helping him, but more likely diuresis is primarily improving symptoms.     Review of Systems   Constitutional: Negative for chills, diaphoresis, fatigue and fever.   HENT: Negative for mouth sores, rhinorrhea and sore throat.    Eyes: Negative for visual disturbance.   Respiratory: Negative for chest tightness and wheezing.    Cardiovascular: Positive for leg swelling. Negative for chest pain and palpitations.   Gastrointestinal: Negative for abdominal pain, constipation, diarrhea, nausea and vomiting.   Genitourinary: Negative for dysuria, flank pain and urgency.   Musculoskeletal: Positive for arthralgias. Negative for back pain.   Neurological: Negative for dizziness, light-headedness and numbness.   Hematological: Negative for adenopathy.   Psychiatric/Behavioral: Negative for agitation, behavioral problems and confusion.     Objective:     Vital Signs (Most Recent):  Temp: 97.7 °F (36.5 °C) (09/28/19 1140)  Pulse: 66 (09/28/19 1140)  Resp: 17 (09/28/19 1140)  BP: 135/72 (09/28/19 1140)  SpO2: 97 % (09/28/19 1140) Vital Signs (24h Range):  Temp:  [97.4 °F (36.3 °C)-98.3 °F (36.8 °C)] 97.7 °F (36.5 °C)  Pulse:  [46-84] 66  Resp:  [16-21] 17  SpO2:  [93 %-99 %] 97 %  BP: (129-166)/(70-95) 135/72   Intake/Outake:  This Shift:  I/O this shift:  In: -   Out: 550 [Urine:550]    Net I/O past  24h:     Intake/Output Summary (Last 24 hours) at 9/28/2019 1145  Last data filed at 9/28/2019 0750  Gross per 24 hour   Intake 240 ml   Output 2360 ml   Net -2120 ml         Weight: (!) 259.1 kg (571 lb 3.4 oz)  Body mass index is 92.2 kg/m².  Physical Exam   Constitutional: No distress.   Morbid obesity   HENT:   Mouth/Throat: Oropharynx is clear and moist. No oropharyngeal exudate.   Eyes: Pupils are equal, round, and reactive to light. Conjunctivae are normal. No scleral icterus.   Neck: Neck supple.   Appeared pt with Ext JVP near angle of clavicle   Cardiovascular: Normal rate, regular rhythm and normal heart sounds.   No murmur heard.  Distant heart sounds   Pulmonary/Chest: Effort normal. No stridor. No respiratory distress. He has no wheezes.   Decreased breath sounds due to body habitus, reduced wheezes on examination, no accessory muscle use, speaking in full sentences   Abdominal: Soft. Bowel sounds are normal. There is no tenderness.   obese   Musculoskeletal: He exhibits edema.   Trophic skin changes to anterior shins. Pitting edema 4+ up to mid-shin Knee.    Lymphadenopathy:     He has no cervical adenopathy.   Skin: Skin is warm and dry.   lymphedematous changes of medial bilateral thighs   Vitals reviewed.        Significant Labs:   BMP:   Recent Labs   Lab 09/28/19  0716         K 4.0      CO2 27   BUN 14   CREATININE 0.9   CALCIUM 9.8   MG 2.1       Significant Imaging: I have reviewed all pertinent imaging results/findings within the past 24 hours.    MEDICATIONS  Scheduled Meds:   albuterol-ipratropium  3 mL Nebulization Q6H WAKE    enoxaparin  40 mg Subcutaneous Daily    furosemide  40 mg Intravenous BID    miconazole NITRATE 2 %   Topical (Top) BID    pantoprazole  40 mg Oral Before breakfast    polyethylene glycol  17 g Oral Daily    senna-docusate 8.6-50 mg  1 tablet Oral BID    topiramate  50 mg Oral Daily                             Continuous Infusions:  PRN  Meds:.acetaminophen, acetaminophen, ondansetron, prochlorperazine, sodium chloride 0.9%, sodium chloride 0.9%    Assessment/Plan:     Overview/Hospital Course:       1. SOB_DOE_Acute Diastolic Heart Failure   -continue lasix 40mg IV BID  -continue nebulizers  -suspect Diastolic HF symptoms primary culprit of symptoms  -ECHO is pending   -pt asked about discharge today, but he has significant pitting edema in LE, and renal function/hemodynamics are tolerating diuresis so would like to continue IV medication as he tolerates before ambulatory transition.   -given weekend, if ECHO is not completed inpt and symptoms improve notably, will require ambulatory ECHOcardiogram        2. JUDSON   -per sleep clinic note setting 17cm H20 used during sleep study  -CPAP 17 QHS order placed     3. Essential Htn  -hold thiazide while diuresing patient         4. Morbid Obesity   -Wound Care consult for bariatric bed  -pt reports [facility].   -Pt in a wellness program in recent months/years where he did have success with weight lost, but patient states his wife passed away in last 6 months and that has been a stressor resulting in not maintaining with his prior diet/eating plan.   -is on Topamax per Dr. Craig     5. Bilateral Primary OA of Knee   -uses Rx diclofenac, will hold as inpatient while on lasix and will monitor renal function closely        Code Status: Full Code    Active Hospital Problems    Diagnosis  POA    *Acute diastolic heart failure [I50.31]  Yes     Priority: 1 - High    SOB (shortness of breath) [R06.02]  Yes     Priority: 2     Obstructive sleep apnea on CPAP [G47.33, Z99.89]  Not Applicable     Priority: 3     Essential hypertension [I10]  Yes     Priority: 4     Morbid obesity with body mass index of 70 and over in adult [E66.01, Z68.45]  Not Applicable     Priority: 5      Chronic    Bilateral primary  osteoarthritis of knee [M17.0]  Yes     Priority: 7       Resolved Hospital Problems   No resolved problems to display.     VTE Risk Mitigation (From admission, onward)         Ordered     enoxaparin injection 40 mg  Daily      09/27/19 1748     IP VTE HIGH RISK PATIENT  Once      09/27/19 1748     Place sequential compression device  Until discontinued      09/27/19 1748     Place sequential compression device  Until discontinued      09/27/19 1748                        Jaylan Allen M.D.  Attending Physician  [facility] Medicine Dept.  Pager: [phone]  [facility]  [id number]

## 2019-09-28 NOTE — PT/OT/SLP EVAL
Occupational Therapy   Evaluation and Discharge Note    Name: Ke Oliver  MRN: [id number]  Admitting Diagnosis:  Acute diastolic heart failure      Recommendations:     Discharge Recommendations: home  Discharge Equipment Recommendations:  none  Barriers to discharge:  None    Assessment:     Ke Oliver is a 49 y.o. male with a medical diagnosis of Acute diastolic heart failure. At this time, patient is functioning at their prior level of function and does not require further acute OT services.     Plan:     During this hospitalization, patient does not require further acute OT services.  Please re-consult if situation changes.    · Plan of Care Reviewed with: patient, significant other    Subjective     Occupational Profile:  Living Environment: Pt lives with girlfriend and daughter in a [facility] with ramp access  Prior to admission, patients level of function was indep with ambulation and ADLs.  Equipment used at home: none.  DME owned (not currently used): none.  Upon discharge, patient will have assistance from girlfriend and daughter.    Pain/Comfort:  · Pain Rating 1: 0/10    Patients cultural, spiritual, Jainism conflicts given the current situation:      Objective:     Communicated with: rn prior to session.  Patient found eob with   upon OT entry to room.    General Precautions: Standard,     Orthopedic Precautions:    Braces:       Occupational Performance:    Bed Mobility:    · (I) - sitting EOB.    Functional Mobility/Transfers:  Independent    Activities of Daily Living:  · Setup - (I)    Physical Exam:  BUE AROM/MMT: WNL    AMPAC 6 Click ADL:  AMPAC Total Score: 24    Treatment & Education:  UE ROM/MMT  Bed mobility training / assessment  Functional mobility assessment  Sit/standing balance assessment  Educated on importance of sitting OOB in bedside chair to promote increased strength, endurance & breathing.  Discussed D/C of OT  Education:    Patient left EOB with call button in  reach    GOALS:   Multidisciplinary Problems     Occupational Therapy Goals     Not on file          Multidisciplinary Problems (Resolved)        Problem: Occupational Therapy Goal    Goal Priority Disciplines Outcome Interventions   Occupational Therapy Goal   (Resolved)     OT, PT/OT Met                    History:     Past Medical History:   Diagnosis Date    Bilateral primary osteoarthritis of knee     Hypertension     Obesity     Sleep apnea        Past Surgical History:   Procedure Laterality Date    ANKLE SURGERY  1978    ankle fracture pinnned    COLONOSCOPY N/A 8/4/2016    COLONOSCOPY;  Surgeon: Carson Mittal MD    HERNIA REPAIR  1999    herniated testicle       Time Tracking:     OT Date of Treatment: 09/28/19  OT Start Time: 0945  OT Stop Time: 0953  OT Total Time (min): 8 min    Billable Minutes:Evaluation 8    NATHANIEL Zayas  9/28/2019

## 2019-09-28 NOTE — PT/OT/SLP EVAL
Physical Therapy Evaluation/Discharge    Patient Name:  Ke Oliver   MRN:  [id number]    Recommendations:     Discharge Recommendations:  home   Discharge Equipment Recommendations: none   Barriers to discharge: None    Assessment:     Ke Oliver is a 49 y.o. male admitted with a medical diagnosis of Acute diastolic heart failure.  He presents with the following impairments/functional limitations:  impaired cardiopulmonary response to activity, impaired endurance. PT evaluation complete and no goals established. Pt is functioning at high level and does not required acute care physical therapy services. Education provided to ambulate in hallway 3x/day with RN in order to maintain strength and endurance. Pt verbalized understanding. Please re-consult if functional mobility changes. Anticipate d/c to home; no needs.         Rehab Prognosis: Good     Recent Surgery: * No surgery found *      Plan:     · D/c from acute PT    Subjective     Chief Complaint: none reported   Patient/Family Comments/goals: to get better and return home   Pain/Comfort:  · Pain Rating 1: 0/10  · Pain Rating Post-Intervention 1: 0/10    Patients cultural, spiritual, Mu-ism conflicts given the current situation: no    Living Environment:  Pt lives with girlfriend and daughter in a [facility] with ramp access  Prior to admission, patients level of function was indep with ambulation and ADLs.  Equipment used at home: none.  DME owned (not currently used): none.  Upon discharge, patient will have assistance from girlfriend and daughter.    Objective:     Communicated with RN prior to session and girlfriend present in room.  Patient found sitting EOB with telemetry  upon PT entry to room.    General Precautions: Standard, fall   Orthopedic Precautions:N/A   Braces: N/A     Exams:  · Cognitive Exam:    · AAOx4  · Follows multistep commands  · Communication clear/fluent   · RLE ROM: WFL  · RLE Strength: WFL  · LLE ROM: WFL  · LLE Strength:  WFL    Functional Mobility:  · Bed Mobility: NT 2nd to pt found sitting EOB   · Transfers:     · Sit to Stand:  independence with no AD from EOB  · Gait: 150ft with independence using no AD  · No gait deviations noted  · No LOB with horizontal head turns, change of speed, or change in direction  · Slight SOB present       Therapeutic Activities and Exercises:  Educated pt on PT role/POC  Educated pt on importance of OOB activity and daily ambulation   Pt verbalized understanding     Pt able to don shoes with mod indep in standing (stepping in to shoes) with min A to get back of L shoe out from under his heel     AM-PAC 6 CLICK MOBILITY  Total Score:24     Patient left sitting EOB with all lines intact, call button in reach and RN notified.    GOALS:   Multidisciplinary Problems     Physical Therapy Goals     Not on file                History:     Past Medical History:   Diagnosis Date    Bilateral primary osteoarthritis of knee     Hypertension     Obesity     Sleep apnea        Past Surgical History:   Procedure Laterality Date    ANKLE SURGERY  1978    ankle fracture pinnned    COLONOSCOPY N/A 8/4/2016    COLONOSCOPY;  Surgeon: Carson Mittal MD    HERNIA REPAIR  1999    herniated testicle       Time Tracking:     PT Received On: 09/28/19  PT Start Time: 0949     PT Stop Time: 0957  PT Total Time (min): 8 min     Billable Minutes: Evaluation 8    Faith De La Rosa, PT, DPT  9/28/2019  [phone]

## 2019-09-29 VITALS
RESPIRATION RATE: 18 BRPM | BODY MASS INDEX: 50.62 KG/M2 | HEIGHT: 66 IN | OXYGEN SATURATION: 95 % | SYSTOLIC BLOOD PRESSURE: 132 MMHG | DIASTOLIC BLOOD PRESSURE: 74 MMHG | TEMPERATURE: 98 F | HEART RATE: 77 BPM | WEIGHT: 315 LBS

## 2019-09-29 PROBLEM — R06.02 SOB (SHORTNESS OF BREATH): Status: RESOLVED | Noted: 2019-09-27 | Resolved: 2019-09-29

## 2019-09-29 LAB
ANION GAP SERPL CALC-SCNC: 10 MMOL/L (ref 8–16)
BASOPHILS # BLD AUTO: 0.03 K/UL (ref 0–0.2)
BASOPHILS NFR BLD: 0.3 % (ref 0–1.9)
BUN SERPL-MCNC: 17 MG/DL (ref 6–20)
CALCIUM SERPL-MCNC: 9.1 MG/DL (ref 8.7–10.5)
CHLORIDE SERPL-SCNC: 103 MMOL/L (ref 95–110)
CO2 SERPL-SCNC: 29 MMOL/L (ref 23–29)
CREAT SERPL-MCNC: 1 MG/DL (ref 0.5–1.4)
DIFFERENTIAL METHOD: ABNORMAL
EOSINOPHIL # BLD AUTO: 0.2 K/UL (ref 0–0.5)
EOSINOPHIL NFR BLD: 1.7 % (ref 0–8)
ERYTHROCYTE [DISTWIDTH] IN BLOOD BY AUTOMATED COUNT: 13.9 % (ref 11.5–14.5)
EST. GFR  (AFRICAN AMERICAN): >60 ML/MIN/1.73 M^2
EST. GFR  (NON AFRICAN AMERICAN): >60 ML/MIN/1.73 M^2
GLUCOSE SERPL-MCNC: 95 MG/DL (ref 70–110)
HCT VFR BLD AUTO: 42.5 % (ref 40–54)
HGB BLD-MCNC: 12.9 G/DL (ref 14–18)
IMM GRANULOCYTES # BLD AUTO: 0.04 K/UL (ref 0–0.04)
IMM GRANULOCYTES NFR BLD AUTO: 0.3 % (ref 0–0.5)
LYMPHOCYTES # BLD AUTO: 2.8 K/UL (ref 1–4.8)
LYMPHOCYTES NFR BLD: 23.4 % (ref 18–48)
MAGNESIUM SERPL-MCNC: 2 MG/DL (ref 1.6–2.6)
MCH RBC QN AUTO: 28 PG (ref 27–31)
MCHC RBC AUTO-ENTMCNC: 30.4 G/DL (ref 32–36)
MCV RBC AUTO: 92 FL (ref 82–98)
MONOCYTES # BLD AUTO: 1.4 K/UL (ref 0.3–1)
MONOCYTES NFR BLD: 11.8 % (ref 4–15)
NEUTROPHILS # BLD AUTO: 7.4 K/UL (ref 1.8–7.7)
NEUTROPHILS NFR BLD: 62.5 % (ref 38–73)
NRBC BLD-RTO: 0 /100 WBC
PHOSPHATE SERPL-MCNC: 3 MG/DL (ref 2.7–4.5)
PLATELET # BLD AUTO: 274 K/UL (ref 150–350)
PMV BLD AUTO: 10.7 FL (ref 9.2–12.9)
POTASSIUM SERPL-SCNC: 3.7 MMOL/L (ref 3.5–5.1)
RBC # BLD AUTO: 4.61 M/UL (ref 4.6–6.2)
SODIUM SERPL-SCNC: 142 MMOL/L (ref 136–145)
WBC # BLD AUTO: 11.89 K/UL (ref 3.9–12.7)

## 2019-09-29 PROCEDURE — 99217 PR OBSERVATION CARE DISCHARGE: ICD-10-PCS | Mod: ,,, | Performed by: HOSPITALIST

## 2019-09-29 PROCEDURE — 99900035 HC TECH TIME PER 15 MIN (STAT)

## 2019-09-29 PROCEDURE — 99217 PR OBSERVATION CARE DISCHARGE: CPT | Mod: ,,, | Performed by: HOSPITALIST

## 2019-09-29 PROCEDURE — 63600175 PHARM REV CODE 636 W HCPCS: Performed by: HOSPITALIST

## 2019-09-29 PROCEDURE — 94660 CPAP INITIATION&MGMT: CPT

## 2019-09-29 PROCEDURE — 25000003 PHARM REV CODE 250: Performed by: HOSPITALIST

## 2019-09-29 PROCEDURE — G0378 HOSPITAL OBSERVATION PER HR: HCPCS

## 2019-09-29 PROCEDURE — 36415 COLL VENOUS BLD VENIPUNCTURE: CPT

## 2019-09-29 PROCEDURE — 83735 ASSAY OF MAGNESIUM: CPT

## 2019-09-29 PROCEDURE — 85025 COMPLETE CBC W/AUTO DIFF WBC: CPT

## 2019-09-29 PROCEDURE — 80048 BASIC METABOLIC PNL TOTAL CA: CPT

## 2019-09-29 PROCEDURE — 84100 ASSAY OF PHOSPHORUS: CPT

## 2019-09-29 RX ORDER — ACETAMINOPHEN 325 MG/1
650 TABLET ORAL EVERY 6 HOURS PRN
Refills: 0 | COMMUNITY
Start: 2019-09-29

## 2019-09-29 RX ORDER — LISINOPRIL 2.5 MG/1
2.5 TABLET ORAL DAILY
Qty: 30 TABLET | Refills: 1 | Status: SHIPPED | OUTPATIENT
Start: 2019-09-29 | End: 2019-12-12 | Stop reason: SDUPTHER

## 2019-09-29 RX ORDER — FUROSEMIDE 40 MG/1
40 TABLET ORAL DAILY
Qty: 30 TABLET | Refills: 1 | Status: SHIPPED | OUTPATIENT
Start: 2019-09-29 | End: 2019-10-03 | Stop reason: DRUGHIGH

## 2019-09-29 RX ORDER — POTASSIUM CHLORIDE 750 MG/1
10 CAPSULE, EXTENDED RELEASE ORAL DAILY
Qty: 30 CAPSULE | Refills: 1 | Status: SHIPPED | OUTPATIENT
Start: 2019-09-29 | End: 2019-12-02 | Stop reason: SDUPTHER

## 2019-09-29 RX ADMIN — TOPIRAMATE 50 MG: 25 TABLET, FILM COATED ORAL at 09:09

## 2019-09-29 RX ADMIN — PANTOPRAZOLE SODIUM 40 MG: 40 TABLET, DELAYED RELEASE ORAL at 05:09

## 2019-09-29 RX ADMIN — FUROSEMIDE 40 MG: 10 INJECTION, SOLUTION INTRAMUSCULAR; INTRAVENOUS at 09:09

## 2019-09-29 RX ADMIN — MICONAZOLE NITRATE: 20 POWDER TOPICAL at 10:09

## 2019-09-29 NOTE — PLAN OF CARE
POC reviewed with pt, verbalized an understanding; NADN; VSS; pt rested quietly through the night; no acute event/fall this shift; call bell in reach, bed in lowest position

## 2019-09-29 NOTE — NURSING
DC instructions given to pt. Reviewed medications. Voices understanding. To car per w/c with transport.    Tele box#[phone] placed in return bin

## 2019-09-29 NOTE — DISCHARGE INSTRUCTIONS
1.  Please keep all scheduled appointments.     2. I have placed referrals to the Bariatric Medicine Clinic, Nutrition Consult and to have an Echocardiogram(ultrasound of your heart)  You should be contacted to schedule these appointments/tests    3. I have attached information regarding new medications you will be discharged one and information regarding following a fluid restricted and salt restricted diet.

## 2019-09-30 ENCOUNTER — OFFICE VISIT (OUTPATIENT)
Dept: INTERNAL MEDICINE | Facility: CLINIC | Age: 49
End: 2019-09-30
Payer: MEDICARE

## 2019-09-30 VITALS
HEART RATE: 92 BPM | SYSTOLIC BLOOD PRESSURE: 138 MMHG | WEIGHT: 315 LBS | DIASTOLIC BLOOD PRESSURE: 76 MMHG | HEIGHT: 66 IN | BODY MASS INDEX: 50.62 KG/M2 | TEMPERATURE: 98 F | OXYGEN SATURATION: 96 %

## 2019-09-30 DIAGNOSIS — R06.02 SOB (SHORTNESS OF BREATH): ICD-10-CM

## 2019-09-30 DIAGNOSIS — M17.0 BILATERAL PRIMARY OSTEOARTHRITIS OF KNEE: ICD-10-CM

## 2019-09-30 DIAGNOSIS — I10 ESSENTIAL HYPERTENSION: ICD-10-CM

## 2019-09-30 DIAGNOSIS — E66.01 MORBID OBESITY WITH BODY MASS INDEX OF 70 AND OVER IN ADULT: Chronic | ICD-10-CM

## 2019-09-30 DIAGNOSIS — Z09 HOSPITAL DISCHARGE FOLLOW-UP: Primary | ICD-10-CM

## 2019-09-30 DIAGNOSIS — I50.31 ACUTE DIASTOLIC HEART FAILURE: ICD-10-CM

## 2019-09-30 DIAGNOSIS — G47.33 OBSTRUCTIVE SLEEP APNEA ON CPAP: ICD-10-CM

## 2019-09-30 DIAGNOSIS — R73.03 PREDIABETES: ICD-10-CM

## 2019-09-30 PROCEDURE — 99214 OFFICE O/P EST MOD 30 MIN: CPT | Mod: S$GLB,,, | Performed by: NURSE PRACTITIONER

## 2019-09-30 PROCEDURE — 99999 PR PBB SHADOW E&M-EST. PATIENT-LVL IV: CPT | Mod: PBBFAC,,, | Performed by: NURSE PRACTITIONER

## 2019-09-30 PROCEDURE — 99999 PR PBB SHADOW E&M-EST. PATIENT-LVL IV: ICD-10-PCS | Mod: PBBFAC,,, | Performed by: NURSE PRACTITIONER

## 2019-09-30 PROCEDURE — 99499 UNLISTED E&M SERVICE: CPT | Mod: S$GLB,,, | Performed by: NURSE PRACTITIONER

## 2019-09-30 PROCEDURE — 99214 PR OFFICE/OUTPT VISIT, EST, LEVL IV, 30-39 MIN: ICD-10-PCS | Mod: S$GLB,,, | Performed by: NURSE PRACTITIONER

## 2019-09-30 PROCEDURE — 99499 RISK ADDL DX/OHS AUDIT: ICD-10-PCS | Mod: S$GLB,,, | Performed by: NURSE PRACTITIONER

## 2019-09-30 NOTE — DISCHARGE SUMMARY
[facility] Hospital Medicine  Discharge Summary      Patient Name: Ke Oliver  MRN: [id number]  Admission Date: 9/27/2019  Hospital Length of Stay: 0 days  Discharge Date and Time: 9/29/2019  1:15 PM  Attending Physician: Jaylan Allen MD  Discharging Provider: Jaylan Allen MD  Primary Care Provider: Corey Craig MD    [facility] Medicine [facility] [facility] Jaylan Allen MD    HPI:   48 y/o AAM hx of Morbid Obesity (BMI>90), JUDSON on CPAP, Hypertension, bilateral Knee OA, Gerd presnts to the ED with complaints of shortness of breath.  Patient has a subacute history of dyspnea with dyspnea on exertion and progression of his symptoms, he has associated increased LE pitting edema Left >Right.  He has a chronic multi-pillow orthopnea but character of orthopnea has not changed with recent symptoms.  Patient does feel change to abdominal girth how clothes fitting.      He was seen in PCP office on 8/23 (Dr. Craig) and had c/o of STALLINGS and prescribed Albuterol inhaler,   Last Wednesday he was seen in Urgent care and was prescribed Doxycycline, Prednisone 40mg x 5 days and Albuterol, he completed these medications noted only mild improvement.   In ED today patient received Duonebs, IV Methylpred and also IV Lasix.  He reports after 2nd bout of urination post-lasix felt some improvement in symptoms.     He has hypertension and takes HCTZ, reports that he doesn't take every day, last dose wed, has a home BP cuff and normally BP is in 130 systolic on forearm.   He will start the HCTZ when he feels LE swelling increased.        * No surgery found *      Hospital Course:     Patient admitted for persistent dyspnea on exertion of subacute onset with recent outpatient treatment for possible bronchitis/reactive airway exacerbation without significant improvement.  Patient is morbidly obese and examination provides limited data but patient had symptom improvement with IV lasix in ED and admitted.      Patient was treated for concerns of acute diastolic heart failure, his risk factors include, hx of JUDSON, but does wear cpap, HTN, and super morbid obesity.  He was started on 40mg IV lasix BID and over two days had diuresed down 11lb (579-->568) he had symptomatic improvement.  He never had respiratory ditress, no hypoxemia.  ECHO was ordered but not completed over weekend and given improvement patient was discharged with ambulatory ECHO order.     His meds were changed from HCTZ for hypertension to Lasix 40mg PO daily and Lisinopril 2.5mg daily, can be titrated as patient tolerates.       1. SOB_DOE_Acute Diastolic Heart Failure   -continue lasix 40mg IV BID  -continued on nebulizers IP  -suspect Diastolic HF symptoms primary culprit of symptoms  --given weekend, if ECHO is not completed inpt and symptoms improve notably, will require ambulatory ECHOcardiogram  -Suspect given his degree of obesity, marker such as BNP is not of high clinical utility to rule out condition, given his presenting symptoms       2. JUDSON   -per sleep clinic note setting 17cm H20 used during sleep study  -CPAP 17 QHS order placed     3. Essential Htn  -hold thiazide while diuresing patient   -medication regimen changed as per med rec below.         4. Morbid Obesity   -Wound Care consult for bariatric bed  -pt reports he [facility].   -Pt in a wellness program in recent months/years where he did have success with weight lost, but patient states his wife passed away in last 6 months and that has been a stressor resulting in not maintaining with his prior diet/eating plan.   -is on Topamax per Dr. Craig  -Bariatric medicine clinic referral placed on discharge.      5. Bilateral Primary OA of Knee   -uses Rx diclofenac, will hold as inpatient while on lasix and will monitor renal function closely   REnal funciton tolerated IV diuresis and orders  for pt to resume diclofenac on discharge.     Consults:     Final Active Diagnoses:    Diagnosis Date Noted POA    PRINCIPAL PROBLEM:  Acute diastolic heart failure [I50.31] 09/27/2019 Yes    Obstructive sleep apnea on CPAP [G47.33, Z99.89] 03/27/2014 Not Applicable    Essential hypertension [I10] 04/17/2014 Yes    Morbid obesity with body mass index of 70 and over in adult [E66.01, Z68.45] 04/17/2014 Not Applicable     Chronic    Bilateral primary osteoarthritis of knee [M17.0] 11/30/2016 Yes      Problems Resolved During this Admission:    Diagnosis Date Noted Date Resolved POA    SOB (shortness of breath) [R06.02] 09/27/2019 09/29/2019 Yes      Discharged Condition: stable    Disposition: Home or Self Care    Follow Up:    Patient Instructions:      Ambulatory consult to Bariatric Medicine   Referral Priority: Routine Referral Type: Consultation   Referral Reason: Specialty Services Required   Requested Specialty: Bariatrics   Number of Visits Requested: 1     Ambulatory consult to Nutrition Services   Referral Priority: Routine Referral Type: Consultation   Referral Reason: Specialty Services Required   Requested Specialty: Nutrition   Number of Visits Requested: 1     Diet Cardiac   Order Comments: Recommend to follow 2L (2,000mL) oral fluid restriction.  Try to limit all oral fluid intake (includes water/tea/juice/coffee/soda etc) to no more than 2 Liters per day     Notify your health care provider if you experience any of the following:  temperature >100.4     Notify your health care provider if you experience any of the following:  persistent nausea and vomiting or diarrhea     Notify your health care provider if you experience any of the following:  severe uncontrolled pain     Notify your health care provider if you experience any of the following:  difficulty breathing or increased cough     Notify your health care provider if you experience any of the following:  persistent dizziness,  light-headedness, or visual disturbances     Notify your health care provider if you experience any of the following:  increased confusion or weakness     Echo Color Flow Doppler? Yes   Standing Status: Future Standing Exp. Date: 09/29/20   Order Comments: Morbid obesity, suspected acute diastolic CHF exacerbation, clinical status improved with diuresis inpatient -10lb.   Scheduling Instructions: Contact patient to schedule     Order Specific Question Answer Comments   Color Flow Doppler? Yes      Activity as tolerated     Medications:  Reconciled Home Medications:      Medication List      START taking these medications    acetaminophen 325 MG tablet  Commonly known as:  TYLENOL  Take 2 tablets (650 mg total) by mouth every 6 (six) hours as needed (arthritis pain).  Notes to patient:  As needed every 6 hours for pain     furosemide 40 MG tablet  Commonly known as:  LASIX  Take 1 tablet (40 mg total) by mouth once daily.  Notes to patient:  Take first oral dose Monday morning     lisinopril 2.5 MG tablet  Commonly known as:  PRINIVIL,ZESTRIL  Take 1 tablet (2.5 mg total) by mouth once daily.     potassium chloride 10 MEQ Cpsr  Commonly known as:  MICRO-K  Take 1 capsule (10 mEq total) by mouth once daily. When taking Furosemide(lasix) for 60 doses        CONTINUE taking these medications    albuterol 90 mcg/actuation inhaler  Commonly known as:  VENTOLIN HFA  Inhale 1-2 puffs into the lungs every 6 (six) hours as needed for Shortness of Breath. Rescue     diclofenac 75 MG EC tablet  Commonly known as:  VOLTAREN  Take 1 tablet (75 mg total) by mouth 2 (two) times daily.     ketoconazole 2 % shampoo  Commonly known as:  NIZORAL  Apply topically twice a week. Apply to scalp, lather, let sit 2-5 minutes, then rinse     omeprazole 40 MG capsule  Commonly known as:  PRILOSEC  Take 1 capsule (40 mg total) by mouth once daily.     topiramate 50 MG tablet  Commonly known as:  TOPAMAX  Take 1 tablet (50 mg total) by mouth  once daily.        STOP taking these medications    chlorthalidone 25 MG Tab  Commonly known as:  HYGROTEN     doxycycline 100 MG capsule  Commonly known as:  MONODOX     predniSONE 20 MG tablet  Commonly known as:  DELTASONE            Significant Diagnostic Studies: Labs:   CMP   Recent Labs   Lab 09/29/19  0337      K 3.7      CO2 29   GLU 95   BUN 17   CREATININE 1.0   CALCIUM 9.1   ANIONGAP 10   ESTGFRAFRICA >60.0   EGFRNONAA >60.0   , CBC   Recent Labs   Lab 09/29/19  0337   WBC 11.89   HGB 12.9*   HCT 42.5      , INR   Lab Results   Component Value Date    INR 1.0 09/27/2019   , Lipid Panel   Lab Results   Component Value Date    CHOL 210 (H) 09/28/2019    HDL 53 09/28/2019    LDLCALC 145.6 09/28/2019    TRIG 57 09/28/2019    CHOLHDL 25.2 09/28/2019   , Troponin   Recent Labs   Lab 09/27/19  0835   TROPONINI <0.006    and A1C:   Recent Labs   Lab 06/13/19  0704   HGBA1C 5.7*     Results for TONA JONESALL MONICA (MRN [id number]) as of 9/30/2019 09:01   Ref. Range 9/28/2019 07:16   Cholesterol Latest Ref Range: 120 - 199 mg/dL 210 (H)   HDL Latest Ref Range: 40 - 75 mg/dL 53   Hdl/Cholesterol Ratio Latest Ref Range: 20.0 - 50.0 % 25.2   LDL Cholesterol External Latest Ref Range: 63.0 - 159.0 mg/dL 145.6   Non-HDL Cholesterol Latest Units: mg/dL 157   Total Cholesterol/HDL Ratio Latest Ref Range: 2.0 - 5.0  4.0   Triglycerides Latest Ref Range: 30 - 150 mg/dL 57     Results for CIRILO JONES (MRN [id number]) as of 9/30/2019 09:01   Ref. Range 9/27/2019 08:35   BNP Latest Ref Range: 0 - 99 pg/mL <10   Troponin I Latest Ref Range: 0.000 - 0.026 ng/mL <0.006     Results for TONA JONESALL MONICA (MRN [id number]) as of 9/30/2019 09:01   Ref. Range 9/28/2019 07:19   Procalcitonin Latest Ref Range: <0.25 ng/mL 0.02     Pending Diagnostic Studies:     Procedure Component Value Units Date/Time    Echo Color Flow Doppler? Yes; Bubble Contrast? Yes [[id number]]     Order Status:  Sent Lab Status:  No result          Indwelling Lines/Drains at time of discharge:   Lines/Drains/Airways     None                 Time spent on the discharge of patient: 25 minutes  Patient was seen and examined on the date of discharge and determined to be suitable for discharge.         Jaylan Allen MD  Department of Hospital Medicine  [facility]

## 2019-09-30 NOTE — PROGRESS NOTES
Subjective:       Patient ID: Ke Oliver is a 49 y.o. male.    Chief Complaint: Hospital Follow Up    Pt of Dr. Craig, here for hospital follow up.     Went to ER on 9-27-19 for SOB. Had had this issue during last visit with PCP Dr. Craig on 8-23-19. Was given an albuterol inhaler which on average he has to use 2-3 times a day. Has a 17 yr hx of smoking. Quit in 2001. ER wanted to order an Echo, and referred pt to Bariatric medicine and Nutritionist. These appts have not been set up yet.    I have reviewed the discharge summary below:    Author: Jaylan Allen MD Service: Hospital Medicine Author Type: Physician   Filed: 9/30/2019  9:01 AM Status: Signed   : Jaylan Allen MD (Physician)      [facility] Hospital Medicine  Discharge Summary        Patient Name: Ke Oliver  MRN: [id number]  Admission Date: 9/27/2019  Hospital Length of Stay: 0 days  Discharge Date and Time: 9/29/2019  1:15 PM  Attending Physician: Jaylan Allen MD  Discharging Provider: Jaylan Allen MD  Primary Care Provider: Corey Craig MD     [facility] [place] [facility] [place] [facility] Jaylan Allen MD     HPI:   48 y/o AAM hx of Morbid Obesity (BMI>90), JUDSON on CPAP, Hypertension, bilateral Knee OA, Gerd presnts to the ED with complaints of shortness of breath.  Patient has a subacute history of dyspnea with dyspnea on exertion and progression of his symptoms, he has associated increased LE pitting edema Left >Right.  He has a chronic multi-pillow orthopnea but character of orthopnea has not changed with recent symptoms.  Patient does feel change to abdominal girth how clothes fitting.      He was seen in PCP office on 8/23 (Dr. Craig) and had c/o of STALLINGS and prescribed Albuterol inhaler,   Last Wednesday he was seen in Urgent care and was prescribed Doxycycline, Prednisone 40mg x 5 days and Albuterol, he completed these medications noted only mild improvement.   In ED today patient received Duonebs, IV  Methylpred and also IV Lasix.  He reports after 2nd bout of urination post-lasix felt some improvement in symptoms.     He has hypertension and takes HCTZ, reports that he doesn't take every day, last dose wed, has a home BP cuff and normally BP is in 130 systolic on forearm.   He will start the HCTZ when he feels LE swelling increased.         * No surgery found *      Hospital Course:      Patient admitted for persistent dyspnea on exertion of subacute onset with recent outpatient treatment for possible bronchitis/reactive airway exacerbation without significant improvement.  Patient is morbidly obese and examination provides limited data but patient had symptom improvement with IV lasix in ED and admitted.      Patient was treated for concerns of acute diastolic heart failure, his risk factors include, hx of JUDSON, but does wear cpap, HTN, and super morbid obesity.  He was started on 40mg IV lasix BID and over two days had diuresed down 11lb (579-->568) he had symptomatic improvement.  He never had respiratory ditress, no hypoxemia.  ECHO was ordered but not completed over weekend and given improvement patient was discharged with ambulatory ECHO order.      His meds were changed from HCTZ for hypertension to Lasix 40mg PO daily and Lisinopril 2.5mg daily, can be titrated as patient tolerates.         1. SOB_DOE_Acute Diastolic Heart Failure   -continue lasix 40mg IV BID  -continued on nebulizers IP  -suspect Diastolic HF symptoms primary culprit of symptoms  --given weekend, if ECHO is not completed inpt and symptoms improve notably, will require ambulatory ECHOcardiogram  -Suspect given his degree of obesity, marker such as BNP is not of high clinical utility to rule out condition, given his presenting symptoms       2. JUDSON   -per sleep clinic note setting 17cm H20 used during sleep study  -CPAP 17 QHS order placed     3. Essential Htn  -hold thiazide while diuresing patient   -medication regimen changed as per  med rec below.         4. Morbid Obesity   -Wound Care consult for bariatric bed  -pt reports [facility].   -Pt in a wellness program in recent months/years where he did have success with weight lost, but patient states his wife passed away in last 6 months and that has been a stressor resulting in not maintaining with his prior diet/eating plan.   -is on Topamax per Dr. Craig  -Bariatric medicine clinic referral placed on discharge.      5. Bilateral Primary OA of Knee   -uses Rx diclofenac, will hold as inpatient while on lasix and will monitor renal function closely   REnal funciton tolerated IV diuresis and orders for pt to resume diclofenac on discharge.      Consults:              Final Active Diagnoses:     Diagnosis Date Noted POA    PRINCIPAL PROBLEM:  Acute diastolic heart failure [I50.31] 09/27/2019 Yes    Obstructive sleep apnea on CPAP [G47.33, Z99.89] 03/27/2014 Not Applicable    Essential hypertension [I10] 04/17/2014 Yes    Morbid obesity with body mass index of 70 and over in adult [E66.01, Z68.45] 04/17/2014 Not Applicable       Chronic    Bilateral primary osteoarthritis of knee [M17.0] 11/30/2016 Yes       Problems Resolved During this Admission:     Diagnosis Date Noted Date Resolved POA    SOB (shortness of breath) [R06.02] 09/27/2019 09/29/2019 Yes      Discharged Condition: stable     Disposition: Home or Self Care     Follow Up:  Patient Instructions:           Ambulatory consult to Bariatric Medicine   Referral Priority: Routine Referral Type: Consultation   Referral Reason: Specialty Services Required   Requested Specialty: Bariatrics   Number of Visits Requested: 1          Ambulatory consult to Nutrition Services   Referral Priority: Routine Referral Type: Consultation   Referral Reason: Specialty Services Required   Requested Specialty: Nutrition   Number of Visits Requested: 1           Diet Cardiac   Order Comments: Recommend to follow 2L (2,000mL) oral fluid restriction.  Try to limit all oral fluid intake (includes water/tea/juice/coffee/soda etc) to no more than 2 Liters per day      Notify your health care provider if you experience any of the following:  temperature >100.4      Notify your health care provider if you experience any of the following:  persistent nausea and vomiting or diarrhea      Notify your health care provider if you experience any of the following:  severe uncontrolled pain      Notify your health care provider if you experience any of the following:  difficulty breathing or increased cough      Notify your health care provider if you experience any of the following:  persistent dizziness, light-headedness, or visual disturbances      Notify your health care provider if you experience any of the following:  increased confusion or weakness            Echo Color Flow Doppler? Yes   Standing Status: Future Standing Exp. Date: 09/29/20   Order Comments: Morbid obesity, suspected acute diastolic CHF exacerbation, clinical status improved with diuresis inpatient -10lb.   Scheduling Instructions: Contact patient to schedule      Order Specific Question Answer Comments   Color Flow Doppler? Yes        Activity as tolerated      Medications:  Reconciled Home Medications:       Medication List       START taking these medications    acetaminophen 325 MG tablet  Commonly known as:  TYLENOL  Take 2 tablets (650 mg total) by mouth every 6 (six) hours as needed (arthritis pain).  Notes to patient:  As needed every 6 hours for pain      furosemide 40 MG tablet  Commonly known as:  LASIX  Take 1 tablet (40 mg total) by mouth once daily.  Notes to patient:  Take first oral dose Monday morning      lisinopril 2.5 MG tablet  Commonly known as:  PRINIVIL,ZESTRIL  Take 1 tablet (2.5 mg total) by mouth once daily.      potassium chloride 10 MEQ Cpsr  Commonly known as:  MICRO-K  Take 1  capsule (10 mEq total) by mouth once daily. When taking Furosemide(lasix) for 60 doses          CONTINUE taking these medications    albuterol 90 mcg/actuation inhaler  Commonly known as:  VENTOLIN HFA  Inhale 1-2 puffs into the lungs every 6 (six) hours as needed for Shortness of Breath. Rescue      diclofenac 75 MG EC tablet  Commonly known as:  VOLTAREN  Take 1 tablet (75 mg total) by mouth 2 (two) times daily.      ketoconazole 2 % shampoo  Commonly known as:  NIZORAL  Apply topically twice a week. Apply to scalp, lather, let sit 2-5 minutes, then rinse      omeprazole 40 MG capsule  Commonly known as:  PRILOSEC  Take 1 capsule (40 mg total) by mouth once daily.      topiramate 50 MG tablet  Commonly known as:  TOPAMAX  Take 1 tablet (50 mg total) by mouth once daily.          STOP taking these medications    chlorthalidone 25 MG Tab  Commonly known as:  HYGROTEN      doxycycline 100 MG capsule  Commonly known as:  MONODOX      predniSONE 20 MG tablet  Commonly known as:  DELTASONE                Significant Diagnostic Studies: Labs:   CMP       Recent Labs   Lab 09/29/19  0337      K 3.7      CO2 29   GLU 95   BUN 17   CREATININE 1.0   CALCIUM 9.1   ANIONGAP 10   ESTGFRAFRICA >60.0   EGFRNONAA >60.0   , CBC       Recent Labs   Lab 09/29/19 0337   WBC 11.89   HGB 12.9*   HCT 42.5      , INR         Lab Results   Component Value Date     INR 1.0 09/27/2019   , Lipid Panel         Lab Results   Component Value Date     CHOL 210 (H) 09/28/2019     HDL 53 09/28/2019     LDLCALC 145.6 09/28/2019     TRIG 57 09/28/2019     CHOLHDL 25.2 09/28/2019   , Troponin       Recent Labs   Lab 09/27/19  0835   TROPONINI <0.006    and A1C:       Recent Labs   Lab 06/13/19  0704   HGBA1C 5.7*      Results for CIRILO JONES (MRN [id number]) as of 9/30/2019 09:01    Ref. Range 9/28/2019 07:16   Cholesterol Latest Ref Range: 120 - 199 mg/dL 210 (H)   HDL Latest Ref Range: 40 - 75 mg/dL 53   Hdl/Cholesterol Ratio  Latest Ref Range: 20.0 - 50.0 % 25.2   LDL Cholesterol External Latest Ref Range: 63.0 - 159.0 mg/dL 145.6   Non-HDL Cholesterol Latest Units: mg/dL 157   Total Cholesterol/HDL Ratio Latest Ref Range: 2.0 - 5.0  4.0   Triglycerides Latest Ref Range: 30 - 150 mg/dL 57      Results for CIRILO JONES (MRN [id number]) as of 9/30/2019 09:01    Ref. Range 9/27/2019 08:35   BNP Latest Ref Range: 0 - 99 pg/mL <10   Troponin I Latest Ref Range: 0.000 - 0.026 ng/mL <0.006      Results for CIRILO JONES (MRN [id number]) as of 9/30/2019 09:01    Ref. Range 9/28/2019 07:19   Procalcitonin Latest Ref Range: <0.25 ng/mL 0.02               Pending Diagnostic Studies:      Procedure Component Value Units Date/Time     Echo Color Flow Doppler? Yes; Bubble Contrast? Yes [[id number]]       Order Status:  Sent Lab Status:  No result            Indwelling Lines/Drains at time of discharge:       Lines/Drains/Airways      None                      Time spent on the discharge of patient: 25 minutes  Patient was seen and examined on the date of discharge and determined to be suitable for discharge.           Jaylan Allen MD  Department of Hospital Medicine  [facility]             Review of Systems   Constitutional: Positive for activity change and unexpected weight change. Negative for appetite change, chills, fatigue and fever.   Respiratory: Positive for apnea, shortness of breath and wheezing. Negative for cough and chest tightness.    Cardiovascular: Negative for chest pain, palpitations and leg swelling.   Gastrointestinal: Negative for abdominal pain, diarrhea, nausea and vomiting.   Genitourinary: Negative for dysuria.   Musculoskeletal: Negative for arthralgias.   Skin: Negative for color change, pallor and rash.   Allergic/Immunologic: Negative for environmental allergies, food allergies and immunocompromised state.   Neurological: Negative for dizziness, tremors, seizures, syncope, weakness,  light-headedness, numbness and headaches.   Hematological: Negative for adenopathy. Does not bruise/bleed easily.   Psychiatric/Behavioral: Negative for suicidal ideas.         Review of patient's allergies indicates:  No Known Allergies  Current Outpatient Medications:       acetaminophen (TYLENOL) 325 MG tablet, Take 2 tablets (650 mg total) by mouth every 6 (six) hours as needed (arthritis pain)., Disp: , Rfl: 0    albuterol (VENTOLIN HFA) 90 mcg/actuation inhaler, Inhale 1-2 puffs into the lungs every 6 (six) hours as needed for Shortness of Breath. Rescue, Disp: 18 g, Rfl: 5    diclofenac (VOLTAREN) 75 MG EC tablet, Take 1 tablet (75 mg total) by mouth 2 (two) times daily., Disp: 60 tablet, Rfl: 11    furosemide (LASIX) 40 MG tablet, Take 1 tablet (40 mg total) by mouth once daily., Disp: 30 tablet, Rfl: 1    ketoconazole (NIZORAL) 2 % shampoo, Apply topically twice a week. Apply to scalp, lather, let sit 2-5 minutes, then rinse, Disp: 120 mL, Rfl: 3    lisinopril (PRINIVIL,ZESTRIL) 2.5 MG tablet, Take 1 tablet (2.5 mg total) by mouth once daily., Disp: 30 tablet, Rfl: 1    omeprazole (PRILOSEC) 40 MG capsule, Take 1 capsule (40 mg total) by mouth once daily., Disp: 90 capsule, Rfl: 3    potassium chloride (MICRO-K) 10 MEQ CpSR, Take 1 capsule (10 mEq total) by mouth once daily. When taking Furosemide(lasix) for 60 doses, Disp: 30 capsule, Rfl: 1    topiramate (TOPAMAX) 50 MG tablet, Take 1 tablet (50 mg total) by mouth once daily. (Patient taking differently: Take 50 mg by mouth once daily. ), Disp: 90 tablet, Rfl: 3  No current facility-administered medications for this visit.     Patient Active Problem List   Diagnosis    Obstructive sleep apnea on CPAP    Essential hypertension    Morbid obesity with body mass index of 70 and over in adult    Iron deficiency anemia    Restless leg syndrome    Bilateral primary osteoarthritis of knee    Prediabetes    Acute diastolic heart failure     Past  Medical History:   Diagnosis Date    Bilateral primary osteoarthritis of knee     Hypertension     Obesity     Sleep apnea      Past Surgical History:   Procedure Laterality Date    ANKLE SURGERY      ankle fracture pinnned    COLONOSCOPY N/A 2016    COLONOSCOPY;  Surgeon: Carson Mittal MD    HERNIA REPAIR      herniated testicle     Social History     Socioeconomic History    Marital status:      Spouse name: Not on file    Number of children: Not on file    Years of education: Not on file    Highest education level: Not on file   Occupational History    Not on file   Social Needs    Financial resource strain: Not on file    Food insecurity:     Worry: Not on file     Inability: Not on file    Transportation needs:     Medical: Not on file     Non-medical: Not on file   Tobacco Use    Smoking status: Former Smoker     Packs/day: 1.00     Years: 17.00     Pack years: 17.00     Types: Cigarettes     Last attempt to quit: 2001     Years since quittin.1    Smokeless tobacco: Never Used   Substance and Sexual Activity    Alcohol use: No    Drug use: No    Sexual activity: Not on file   Lifestyle    Physical activity:     Days per week: Not on file     Minutes per session: Not on file    Stress: Not on file   Relationships    Social connections:     Talks on phone: Not on file     Gets together: Not on file     Attends [employer] service: Not on file     Active member of club or organization: Not on file     Attends meetings of clubs or organizations: Not on file     Relationship status: Not on file   Other Topics Concern    Not on file   Social History Narrative    Wife  of kidney cancer in 2016     Family History   Problem Relation Age of Onset    Cancer Mother         lung    Psoriasis Neg Hx     Eczema Neg Hx        Objective:       Vitals:    19 1415   BP: 138/76   Pulse: 92   Temp: 98.3 °F (36.8 °C)   SpO2: 96%   Weight: (!) 257.6 kg (567 lb  "14.5 oz)   Height: 5' 6" (1.676 m)   PainSc: 0-No pain     Body mass index is 91.66 kg/m².    Physical Exam   Constitutional: He is oriented to person, place, and time. He appears well-developed and well-nourished.   Morbidly obese   HENT:   Head: Normocephalic.   Eyes: Pupils are equal, round, and reactive to light. Conjunctivae and EOM are normal.   Neck: Normal range of motion. Neck supple. No JVD present.   Cardiovascular: Normal rate, regular rhythm, normal heart sounds and intact distal pulses.   Pulmonary/Chest: Effort normal. He has wheezes in the right upper field and the left upper field.   Occasional mild expiratory wheezing   Abdominal: Soft. Bowel sounds are normal.   Musculoskeletal: Normal range of motion. He exhibits edema.   Left shin tight, shiny   Neurological: He is alert and oriented to person, place, and time.   Skin: Skin is warm and dry. Capillary refill takes less than 2 seconds.   Psychiatric: He has a normal mood and affect. His behavior is normal. Judgment and thought content normal.   Nursing note and vitals reviewed.      Assessment:       1. Hospital discharge follow-up    2. SOB (shortness of breath)    3. Acute diastolic heart failure    4. Essential hypertension    5. Morbid obesity with body mass index of 70 and over in adult    6. Prediabetes    7. Bilateral primary osteoarthritis of knee    8. Obstructive sleep apnea on CPAP        Plan:       Ke was seen today for hospital follow up.    Diagnoses and all orders for this visit:    Hospital discharge follow-up  Reviewed discharge summary and instructions    Will schedule Echo to check for CHF per ER    Will schedule bariatric medicine and nutritionist referral per ER    Pulmonary function study to check for obstructive lung disease due to hx of smoking and continued wheezing    SOB (shortness of breath)  -     Complete PFT with bronchodilator  Due to hx of smoking and wheezing    Acute diastolic heart failure  Echo will be " set up    Essential hypertension  Stable, followed by PCP    Take medications as prescribed.    Monitor BP at home, goal BP < or = 140/80, call office if consistently above this range.    Follow low salt DASH diet and exercise.    BMI reviewed.    Go to ED if Headaches, blurred vision, chest pain, or SOB occurs along with elevated readings > or = 160/90.    Morbid obesity with body mass index of 70 and over in adult  BMI reviewed.    Diet and exercise to lose weight.    Prediabetes   Follow low carb, low fat, high fiber diet and exercise to prevent the development of T2DM    Bilateral primary osteoarthritis of knee  Chronic, followed by PCP    Obstructive sleep apnea on CPAP  Chronic, followed by PCP    On CPAP    Follow up in about 3 days (around 10/3/2019), or with PCP Dr. Craig as scheduled.

## 2019-09-30 NOTE — PATIENT INSTRUCTIONS
Will schedule Echo to check for CHF per ER    Will schedule bariatric medicine and nutritionist referral per ER    Pulmonary function study to check for obstructive lung disease due to hx of smoking and continued wheezing

## 2019-10-01 ENCOUNTER — HOSPITAL ENCOUNTER (OUTPATIENT)
Dept: CARDIOLOGY | Facility: CLINIC | Age: 49
Discharge: HOME | End: 2019-10-01
Attending: HOSPITALIST
Payer: MEDICARE

## 2019-10-01 ENCOUNTER — HOSPITAL ENCOUNTER (OUTPATIENT)
Dept: PULMONOLOGY | Facility: CLINIC | Age: 49
Discharge: HOME | End: 2019-10-01
Payer: MEDICARE

## 2019-10-01 VITALS
SYSTOLIC BLOOD PRESSURE: 138 MMHG | WEIGHT: 315 LBS | DIASTOLIC BLOOD PRESSURE: 76 MMHG | HEART RATE: 78 BPM | HEIGHT: 66 IN | BODY MASS INDEX: 50.62 KG/M2

## 2019-10-01 DIAGNOSIS — G47.33 OBSTRUCTIVE SLEEP APNEA ON CPAP: ICD-10-CM

## 2019-10-01 DIAGNOSIS — I50.31 ACUTE DIASTOLIC (CONGESTIVE) HEART FAILURE: ICD-10-CM

## 2019-10-01 DIAGNOSIS — R06.02 SOB (SHORTNESS OF BREATH): ICD-10-CM

## 2019-10-01 LAB
AV INDEX (PROSTH): 0.81
AV MEAN GRADIENT: 6 MMHG
AV PEAK GRADIENT: 11 MMHG
AV VALVE AREA: 4.11 CM2
AV VELOCITY RATIO: 0.65
BSA FOR ECHO PROCEDURE: 3.46 M2
CV ECHO LV RWT: 0.34 CM
DLCO ADJ PRE: 31.73 ML/(MIN*MMHG) (ref 23.07–36.93)
DLCO SINGLE BREATH LLN: 23.07
DLCO SINGLE BREATH PRE REF: 100.3 %
DLCO SINGLE BREATH REF: 30
DLCOC SBVA LLN: 3.11
DLCOC SBVA PRE REF: 139.6 %
DLCOC SBVA REF: 4.35
DLCOC SINGLE BREATH LLN: 23.07
DLCOC SINGLE BREATH PRE REF: 105.8 %
DLCOC SINGLE BREATH REF: 30
DLCOCSBVAULN: 5.59
DLCOCSINGLEBREATHULN: 36.93
DLCOSINGLEBREATHULN: 36.93
DLCOVA LLN: 3.11
DLCOVA PRE REF: 132.5 %
DLCOVA PRE: 5.76 ML/(MIN*MMHG*L) (ref 3.11–5.59)
DLCOVA REF: 4.35
DLCOVAULN: 5.59
DLVAADJ PRE: 6.07 ML/(MIN*MMHG*L) (ref 3.11–5.59)
DOP CALC AO PEAK VEL: 1.65 M/S
DOP CALC AO VTI: 28.79 CM
DOP CALC LVOT AREA: 5.1 CM2
DOP CALC LVOT DIAMETER: 2.55 CM
DOP CALC LVOT PEAK VEL: 1.08 M/S
DOP CALC LVOT STROKE VOLUME: 118.42 CM3
DOP CALCLVOT PEAK VEL VTI: 23.2 CM
E WAVE DECELERATION TIME: 178.83 MSEC
E/A RATIO: 1.91
E/E' RATIO: 7.79 M/S
ECHO LV POSTERIOR WALL: 0.99 CM (ref 0.6–1.1)
ERV LLN: 1.28
ERV REF: 1.28
ERVN2 LLN: 1.28
ERVN2 PRE REF: 21.9 %
ERVN2 PRE: 0.28 L (ref 1.28–1.28)
ERVN2 REF: 1.28
ERVN2ULN: 1.28
ERVULN: 1.28
FEF 25 75 LLN: 1.39
FEF 25 75 PRE REF: 49.7 %
FEF 25 75 REF: 2.97
FET100 CHG: 1 %
FEV05 LLN: 1.73
FEV05 REF: 2.86
FEV1 CHG: 9 %
FEV1 FVC LLN: 69
FEV1 FVC PRE REF: 92.3 %
FEV1 FVC REF: 80
FEV1 LLN: 2.4
FEV1 PRE REF: 64 %
FEV1 REF: 3.2
FRACTIONAL SHORTENING: 37 % (ref 28–44)
FRCN2 LLN: 2.48
FRCN2 PRE REF: 56.9 %
FRCN2 REF: 3.46
FRCN2ULN: 4.45
FRCPLETH LLN: 2.48
FRCPLETH REF: 3.46
FRCPLETHULN: 4.45
FVC CHG: 6 %
FVC LLN: 3.06
FVC PRE REF: 69.3 %
FVC REF: 4.02
INTERVENTRICULAR SEPTUM: 0.95 CM (ref 0.6–1.1)
IVC PRE: 3.2 L (ref 3.06–4.98)
IVC SINGLE BREATH LLN: 3.06
IVC SINGLE BREATH PRE REF: 79.5 %
IVC SINGLE BREATH REF: 4.02
IVCSINGLEBREATHULN: 4.98
LA MAJOR: 5.99 CM
LA WIDTH: 3.91 CM
LEFT ATRIUM SIZE: 4.78 CM
LEFT INTERNAL DIMENSION IN SYSTOLE: 3.61 CM (ref 2.1–4)
LEFT VENTRICLE DIASTOLIC VOLUME INDEX: 52.68 ML/M2
LEFT VENTRICLE DIASTOLIC VOLUME: 164.08 ML
LEFT VENTRICLE MASS INDEX: 71 G/M2
LEFT VENTRICLE SYSTOLIC VOLUME INDEX: 17.6 ML/M2
LEFT VENTRICLE SYSTOLIC VOLUME: 54.88 ML
LEFT VENTRICULAR INTERNAL DIMENSION IN DIASTOLE: 5.76 CM (ref 3.5–6)
LEFT VENTRICULAR MASS: 221.44 G
LV LATERAL E/E' RATIO: 7.27 M/S
LV SEPTAL E/E' RATIO: 8.38 M/S
MV PEAK A VEL: 0.57 M/S
MV PEAK E VEL: 1.09 M/S
MVV LLN: 121
MVV REF: 142
PEF LLN: 5.94
PEF PRE REF: 79.8 %
PEF REF: 8.51
POST FEF 25 75: 1.77 L/S (ref 1.39–4.55)
POST FET 100: 7.85 SEC
POST FEV1 FVC: 75.78 % (ref 69.02–90.4)
POST FEV1: 2.24 L (ref 2.4–4)
POST FEV5: 1.8 L (ref 1.73–4)
POST FVC: 2.96 L (ref 3.06–4.98)
POST PEF: 6.95 L/S (ref 5.94–11.09)
PRE DLCO: 30.1 ML/(MIN*MMHG) (ref 23.07–36.93)
PRE FEF 25 75: 1.48 L/S (ref 1.39–4.55)
PRE FET 100: 7.76 SEC
PRE FEV05 REF: 56.6 %
PRE FEV1 FVC: 73.54 % (ref 69.02–90.4)
PRE FEV1: 2.05 L (ref 2.4–4)
PRE FEV5: 1.62 L (ref 1.73–4)
PRE FRC N2: 1.97 L
PRE FVC: 2.79 L (ref 3.06–4.98)
PRE PEF: 6.8 L/S (ref 5.94–11.09)
PULM VEIN S/D RATIO: 1.88
PV PEAK D VEL: 0.17 M/S
PV PEAK S VEL: 0.32 M/S
RA MAJOR: 5.24 CM
RA PRESSURE: 3 MMHG
RA WIDTH: 2.46 CM
RIGHT VENTRICULAR END-DIASTOLIC DIMENSION: 3.02 CM
RV LLN: 1.51
RV REF: 2.18
RV TISSUE DOPPLER FREE WALL SYSTOLIC VELOCITY 1 (APICAL 4 CHAMBER VIEW): 17.92 CM/S
RVN2 LLN: 1.51
RVN2 PRE REF: 77.4 %
RVN2 PRE: 1.69 L (ref 1.51–2.86)
RVN2 REF: 2.18
RVN2TLCN2 LLN: 24.87
RVN2TLCN2 PRE REF: 115.3 %
RVN2TLCN2 PRE: 39.03 % (ref 24.87–42.83)
RVN2TLCN2 REF: 33.85
RVN2TLCN2ULN: 42.83
RVN2ULN: 2.86
RVTLC LLN: 25
RVTLC REF: 34
RVTLCULN: 43
RVULN: 2.86
SINUS: 3.13 CM
STJ: 2.66 CM
TDI LATERAL: 0.15 M/S
TDI SEPTAL: 0.13 M/S
TDI: 0.14 M/S
TLC LLN: 5.75
TLC REF: 6.9
TLC ULN: 8.05
TLCN2 LLN: 5.75
TLCN2 PRE REF: 62.7 %
TLCN2 PRE: 4.33 L (ref 5.75–8.05)
TLCN2 REF: 6.9
TLCN2ULN: 8.05
TRICUSPID ANNULAR PLANE SYSTOLIC EXCURSION: 3.68 CM
VA PRE: 5.23 L (ref 6.75–6.75)
VA SINGLE BREATH LLN: 6.75
VA SINGLE BREATH PRE REF: 77.5 %
VA SINGLE BREATH REF: 6.75
VASINGLEBREATHULN: 6.75
VC LLN: 3.06
VC REF: 4.02
VC ULN: 4.98
VCMAXN2 LLN: 3.06
VCMAXN2 PRE REF: 65.6 %
VCMAXN2 PRE: 2.64 L (ref 3.06–4.98)
VCMAXN2 REF: 4.02
VCMAXN2ULN: 4.98

## 2019-10-01 PROCEDURE — 93306 TTE W/DOPPLER COMPLETE: CPT | Mod: S$GLB,,, | Performed by: INTERNAL MEDICINE

## 2019-10-01 PROCEDURE — 94729 DIFFUSING CAPACITY: CPT | Mod: S$GLB,,, | Performed by: INTERNAL MEDICINE

## 2019-10-01 PROCEDURE — 94727 PR PULM FUNCTION TEST BY GAS: ICD-10-PCS | Mod: S$GLB,,, | Performed by: INTERNAL MEDICINE

## 2019-10-01 PROCEDURE — 93306 ECHO (CUPID ONLY): ICD-10-PCS | Mod: S$GLB,,, | Performed by: INTERNAL MEDICINE

## 2019-10-01 PROCEDURE — 94060 EVALUATION OF WHEEZING: CPT | Mod: S$GLB,,, | Performed by: INTERNAL MEDICINE

## 2019-10-01 PROCEDURE — 96374 ECHO (CUPID ONLY): ICD-10-PCS | Mod: 59,S$GLB,, | Performed by: INTERNAL MEDICINE

## 2019-10-01 PROCEDURE — 94727 GAS DIL/WSHOT DETER LNG VOL: CPT | Mod: S$GLB,,, | Performed by: INTERNAL MEDICINE

## 2019-10-01 PROCEDURE — 94060 PR EVAL OF BRONCHOSPASM: ICD-10-PCS | Mod: S$GLB,,, | Performed by: INTERNAL MEDICINE

## 2019-10-01 PROCEDURE — 96374 THER/PROPH/DIAG INJ IV PUSH: CPT | Mod: 59,S$GLB,, | Performed by: INTERNAL MEDICINE

## 2019-10-01 PROCEDURE — 94729 PR C02/MEMBANE DIFFUSE CAPACITY: ICD-10-PCS | Mod: S$GLB,,, | Performed by: INTERNAL MEDICINE

## 2019-10-01 NOTE — NURSING
Patient identified by 2 identifiers. Denies previous reactions to blood transfusions, allergies reviewed & procedure explained.  22 g IV placed to LtAC, flushed w/ 10cc NS pre & post contrast administration.  3cc Optison administered, echo images obtained.  Pt tolerated procedure well.  IV D/C'ed, preasure dsg applied.  Pt D/C'ed to home.

## 2019-10-02 ENCOUNTER — TELEPHONE (OUTPATIENT)
Dept: INTERNAL MEDICINE | Facility: CLINIC | Age: 49
End: 2019-10-02

## 2019-10-02 NOTE — PROGRESS NOTES
PFTs show airflow is normal. Spirometry remains unimproved following bronchodilator. Lung volumes show  moderate restriction. DLCO is normal. Oximetry is normal.    This means we may need to start you on a daily inhaled corticosteroid inhaler for the restriction in your lungs. I see you have an appt tomorrow with PCP Dr. Craig so maybe this can be discussed during your visit, I have copied him on the results to address tomorrow during your visit.    I looked over your Echocardiogram results also and they are normal so this does not appear to be CHF but more restrictive lung disease based on the breathing test results.

## 2019-10-02 NOTE — TELEPHONE ENCOUNTER
----- Message from America Davila DNP sent at 10/2/2019  9:22 AM CDT -----  PFTs show airflow is normal. Spirometry remains unimproved following bronchodilator. Lung volumes show  moderate restriction. DLCO is normal. Oximetry is normal.    This means we may need to start you on a daily inhaled corticosteroid inhaler for the restriction in your lungs. I see you have an appt tomorrow with PCP Dr. Craig so maybe this can be discussed during your visit, I have copied him on the results to address tomorrow during your visit.    I looked over your Echocardiogram results also and they are normal so this does not appear to be CHF but more restrictive lung disease based on the breathing test results.

## 2019-10-03 ENCOUNTER — OFFICE VISIT (OUTPATIENT)
Dept: INTERNAL MEDICINE | Facility: CLINIC | Age: 49
End: 2019-10-03
Payer: MEDICARE

## 2019-10-03 VITALS
DIASTOLIC BLOOD PRESSURE: 60 MMHG | BODY MASS INDEX: 50.62 KG/M2 | HEART RATE: 93 BPM | HEIGHT: 66 IN | SYSTOLIC BLOOD PRESSURE: 118 MMHG | WEIGHT: 315 LBS | OXYGEN SATURATION: 96 %

## 2019-10-03 DIAGNOSIS — J41.0 CHRONIC BRONCHITIS, SIMPLE: Primary | ICD-10-CM

## 2019-10-03 DIAGNOSIS — I10 ESSENTIAL HYPERTENSION: ICD-10-CM

## 2019-10-03 DIAGNOSIS — M17.0 BILATERAL PRIMARY OSTEOARTHRITIS OF KNEE: ICD-10-CM

## 2019-10-03 DIAGNOSIS — I50.31 ACUTE DIASTOLIC HEART FAILURE: ICD-10-CM

## 2019-10-03 PROCEDURE — 99214 PR OFFICE/OUTPT VISIT, EST, LEVL IV, 30-39 MIN: ICD-10-PCS | Mod: S$GLB,,, | Performed by: INTERNAL MEDICINE

## 2019-10-03 PROCEDURE — 3074F SYST BP LT 130 MM HG: CPT | Mod: CPTII,S$GLB,, | Performed by: INTERNAL MEDICINE

## 2019-10-03 PROCEDURE — 3078F DIAST BP <80 MM HG: CPT | Mod: CPTII,S$GLB,, | Performed by: INTERNAL MEDICINE

## 2019-10-03 PROCEDURE — 99214 OFFICE O/P EST MOD 30 MIN: CPT | Mod: S$GLB,,, | Performed by: INTERNAL MEDICINE

## 2019-10-03 PROCEDURE — 3078F PR MOST RECENT DIASTOLIC BLOOD PRESSURE < 80 MM HG: ICD-10-PCS | Mod: CPTII,S$GLB,, | Performed by: INTERNAL MEDICINE

## 2019-10-03 PROCEDURE — 99499 RISK ADDL DX/OHS AUDIT: ICD-10-PCS | Mod: S$GLB,,, | Performed by: INTERNAL MEDICINE

## 2019-10-03 PROCEDURE — 3008F BODY MASS INDEX DOCD: CPT | Mod: CPTII,S$GLB,, | Performed by: INTERNAL MEDICINE

## 2019-10-03 PROCEDURE — 3008F PR BODY MASS INDEX (BMI) DOCUMENTED: ICD-10-PCS | Mod: CPTII,S$GLB,, | Performed by: INTERNAL MEDICINE

## 2019-10-03 PROCEDURE — 99499 UNLISTED E&M SERVICE: CPT | Mod: S$GLB,,, | Performed by: INTERNAL MEDICINE

## 2019-10-03 PROCEDURE — 99999 PR PBB SHADOW E&M-EST. PATIENT-LVL III: ICD-10-PCS | Mod: PBBFAC,,, | Performed by: INTERNAL MEDICINE

## 2019-10-03 PROCEDURE — 99999 PR PBB SHADOW E&M-EST. PATIENT-LVL III: CPT | Mod: PBBFAC,,, | Performed by: INTERNAL MEDICINE

## 2019-10-03 PROCEDURE — 3074F PR MOST RECENT SYSTOLIC BLOOD PRESSURE < 130 MM HG: ICD-10-PCS | Mod: CPTII,S$GLB,, | Performed by: INTERNAL MEDICINE

## 2019-10-03 RX ORDER — FLUTICASONE FUROATE AND VILANTEROL 100; 25 UG/1; UG/1
1 POWDER RESPIRATORY (INHALATION) DAILY
Qty: 60 EACH | Refills: 5 | Status: SHIPPED | OUTPATIENT
Start: 2019-10-03 | End: 2020-08-26

## 2019-10-03 RX ORDER — FUROSEMIDE 40 MG/1
40 TABLET ORAL 2 TIMES DAILY
Qty: 180 TABLET | Refills: 3 | Status: SHIPPED | OUTPATIENT
Start: 2019-10-03 | End: 2020-10-11

## 2019-10-03 RX ORDER — ALBUTEROL SULFATE 0.63 MG/3ML
0.63 SOLUTION RESPIRATORY (INHALATION) EVERY 6 HOURS PRN
Qty: 1 BOX | Refills: 11 | Status: SHIPPED | OUTPATIENT
Start: 2019-10-03 | End: 2020-09-30 | Stop reason: SDUPTHER

## 2019-10-03 NOTE — PROGRESS NOTES
"Subjective:       Patient ID: Ke Oliver is a 49 y.o. male.    Chief Complaint: Follow-up (Hospital f/u)    HPI    Last visit with me August 2019.  Seen last week by nurse practitioner Giovanni for hospital discharge follow-up.  Sent for spirometry 2 days ago, suggested restrictive disease.    After last visit had worsening wheezing despite albuterol. Seen in urgent care and wheezing persisted despite antibiotics, steroids, and nebulizer. Had significant weight loss with IV Lasix in hospital. Now on Lasix daily. Today notes more fluid buildup. Usually takes Lasix around 6:30 am, effect lasts 2-3 hours.     Review of Systems   All other systems reviewed and are negative.      Objective:      Physical Exam   Constitutional: He is oriented to person, place, and time. No distress.   HENT:   Mouth/Throat: Oropharynx is clear and moist. No oropharyngeal exudate.   Neck: Normal range of motion. No thyromegaly present.   Cardiovascular: Normal rate, regular rhythm and normal heart sounds. Exam reveals no gallop and no friction rub.   No murmur heard.  Pulmonary/Chest: Effort normal. No stridor. He has wheezes. He has no rales.   Musculoskeletal: He exhibits edema. He exhibits no tenderness.   Lymphadenopathy:     He has no cervical adenopathy.   Neurological: He is alert and oriented to person, place, and time.   Skin: He is not diaphoretic.   Psychiatric: He has a normal mood and affect. His behavior is normal.   Nursing note and vitals reviewed.      Vitals:    10/03/19 1352   BP: 118/60   BP Location: Right arm   Patient Position: Sitting   BP Method: Large (Manual)   Pulse: 93   SpO2: 96%   Weight: (!) 258.2 kg (569 lb 3.6 oz)   Height: 5' 6" (1.676 m)     Body mass index is 91.88 kg/m².    RESULTS: Reviewed labs from last 12 months    Assessment:       1. Chronic bronchitis, simple    2. Acute diastolic heart failure    3. Essential hypertension    4. Bilateral primary osteoarthritis of knee        Plan: "   Ke was seen today for follow-up.    Diagnoses and all orders for this visit:    Chronic bronchitis, simple:  New problem, steadily getting worse.  PFTs do not show evidence of obstructive disease, but do suggest restriction.  This may be related to obesity, however if symptoms persist proceed with CT scan to rule out interstitial lung disease.  -     fluticasone furoate-vilanterol (BREO) 100-25 mcg/dose diskus inhaler; Inhale 1 puff into the lungs once daily. Controller  -     albuterol (ACCUNEB) 0.63 mg/3 mL Nebu; Take 3 mLs (0.63 mg total) by nebulization every 6 (six) hours as needed. Rescue    Acute diastolic heart failure:  Recent diagnosis, echocardiogram is normal however symptoms are consistent with heart failure and improved with Lasix.  Increase Lasix to 2 pills daily as needed, either taking 2 together or 1 tablet 2 times a day.  -     furosemide (LASIX) 40 MG tablet; Take 1 tablet (40 mg total) by mouth 2 (two) times daily.  -     Brain natriuretic peptide; Future    Essential hypertension:  Prior diagnosis, stable, well controlled on current management. No changes at this time, will continue to monitor.   -     Basic metabolic panel; Future  -     Magnesium; Future    Bilateral primary osteoarthritis of knee:  Prior dx, hold Voltaren until next week to see if Cr is stable on increased Lasix. Continue Tylenol.    Follow up in about 8 weeks (around 11/28/2019) for follow up visit.  Corey Craig MD  Internal Medicine    Portions of this note were completed using medical dictation software. Please excuse typographical or syntax errors that were missed on review.

## 2019-10-03 NOTE — PATIENT INSTRUCTIONS
"Please start over-the-counter Mucinex-DM or Robitussin-DM (not "-D") to help break up mucus and suppress cough.   "

## 2019-10-08 ENCOUNTER — PATIENT MESSAGE (OUTPATIENT)
Dept: INTERNAL MEDICINE | Facility: CLINIC | Age: 49
End: 2019-10-08

## 2019-10-08 ENCOUNTER — LAB VISIT (OUTPATIENT)
Dept: LAB | Facility: HOSPITAL | Age: 49
End: 2019-10-08
Attending: INTERNAL MEDICINE
Payer: MEDICARE

## 2019-10-08 DIAGNOSIS — I50.31 ACUTE DIASTOLIC HEART FAILURE: ICD-10-CM

## 2019-10-08 DIAGNOSIS — I10 ESSENTIAL HYPERTENSION: ICD-10-CM

## 2019-10-08 DIAGNOSIS — M17.0 BILATERAL PRIMARY OSTEOARTHRITIS OF KNEE: ICD-10-CM

## 2019-10-08 LAB
ANION GAP SERPL CALC-SCNC: 9 MMOL/L (ref 8–16)
BNP SERPL-MCNC: <10 PG/ML (ref 0–99)
BUN SERPL-MCNC: 14 MG/DL (ref 6–20)
CALCIUM SERPL-MCNC: 9.2 MG/DL (ref 8.7–10.5)
CHLORIDE SERPL-SCNC: 106 MMOL/L (ref 95–110)
CO2 SERPL-SCNC: 26 MMOL/L (ref 23–29)
CREAT SERPL-MCNC: 1 MG/DL (ref 0.5–1.4)
EST. GFR  (AFRICAN AMERICAN): >60 ML/MIN/1.73 M^2
EST. GFR  (NON AFRICAN AMERICAN): >60 ML/MIN/1.73 M^2
GLUCOSE SERPL-MCNC: 105 MG/DL (ref 70–110)
MAGNESIUM SERPL-MCNC: 2.1 MG/DL (ref 1.6–2.6)
POTASSIUM SERPL-SCNC: 4.1 MMOL/L (ref 3.5–5.1)
SODIUM SERPL-SCNC: 141 MMOL/L (ref 136–145)

## 2019-10-08 PROCEDURE — 83735 ASSAY OF MAGNESIUM: CPT

## 2019-10-08 PROCEDURE — 80048 BASIC METABOLIC PNL TOTAL CA: CPT

## 2019-10-08 PROCEDURE — 83880 ASSAY OF NATRIURETIC PEPTIDE: CPT

## 2019-10-08 PROCEDURE — 36415 COLL VENOUS BLD VENIPUNCTURE: CPT

## 2019-10-08 RX ORDER — DICLOFENAC SODIUM 75 MG/1
75 TABLET, DELAYED RELEASE ORAL 2 TIMES DAILY PRN
Qty: 180 TABLET | Refills: 3 | Status: SHIPPED | OUTPATIENT
Start: 2019-10-08 | End: 2019-12-04

## 2019-10-08 NOTE — TELEPHONE ENCOUNTER
Please notify the patient that all of his labs are looking good.  I am restarting him on the oral diclofenac (Voltaren) for his knees.  I have sent a new prescription to his [facility] pharmacy.  He should continue to get plenty of water while using the diclofenac, as this medication will make him more prone to kidney irritation if he gets dehydrated.    Please sign and close this encounter once completed and/or scheduled.

## 2019-10-09 ENCOUNTER — TELEPHONE (OUTPATIENT)
Dept: INTERNAL MEDICINE | Facility: CLINIC | Age: 49
End: 2019-10-09

## 2019-10-09 NOTE — TELEPHONE ENCOUNTER
----- Message from Laila Arauz sent at 10/9/2019  8:40 AM CDT -----  Contact: [phone]  Patient is returning a phone call.  Who left a message for the patient: Mandy   Does patient know what this is regarding:    Comments: please advise, thanks

## 2019-11-14 ENCOUNTER — INITIAL CONSULT (OUTPATIENT)
Dept: BARIATRICS | Facility: CLINIC | Age: 49
End: 2019-11-14
Payer: MEDICARE

## 2019-11-14 VITALS
HEIGHT: 66 IN | BODY MASS INDEX: 50.62 KG/M2 | DIASTOLIC BLOOD PRESSURE: 80 MMHG | HEART RATE: 87 BPM | SYSTOLIC BLOOD PRESSURE: 128 MMHG | WEIGHT: 315 LBS

## 2019-11-14 DIAGNOSIS — M17.0 BILATERAL PRIMARY OSTEOARTHRITIS OF KNEE: ICD-10-CM

## 2019-11-14 DIAGNOSIS — R73.03 PREDIABETES: Primary | ICD-10-CM

## 2019-11-14 DIAGNOSIS — E66.01 MORBID OBESITY WITH BODY MASS INDEX OF 70 AND OVER IN ADULT: Chronic | ICD-10-CM

## 2019-11-14 PROCEDURE — 3079F DIAST BP 80-89 MM HG: CPT | Mod: CPTII,S$GLB,, | Performed by: INTERNAL MEDICINE

## 2019-11-14 PROCEDURE — 3008F PR BODY MASS INDEX (BMI) DOCUMENTED: ICD-10-PCS | Mod: CPTII,S$GLB,, | Performed by: INTERNAL MEDICINE

## 2019-11-14 PROCEDURE — 99999 PR PBB SHADOW E&M-EST. PATIENT-LVL III: ICD-10-PCS | Mod: PBBFAC,,, | Performed by: INTERNAL MEDICINE

## 2019-11-14 PROCEDURE — 3074F SYST BP LT 130 MM HG: CPT | Mod: CPTII,S$GLB,, | Performed by: INTERNAL MEDICINE

## 2019-11-14 PROCEDURE — 99214 OFFICE O/P EST MOD 30 MIN: CPT | Mod: S$GLB,,, | Performed by: INTERNAL MEDICINE

## 2019-11-14 PROCEDURE — 99999 PR PBB SHADOW E&M-EST. PATIENT-LVL III: CPT | Mod: PBBFAC,,, | Performed by: INTERNAL MEDICINE

## 2019-11-14 PROCEDURE — 3074F PR MOST RECENT SYSTOLIC BLOOD PRESSURE < 130 MM HG: ICD-10-PCS | Mod: CPTII,S$GLB,, | Performed by: INTERNAL MEDICINE

## 2019-11-14 PROCEDURE — 99214 PR OFFICE/OUTPT VISIT, EST, LEVL IV, 30-39 MIN: ICD-10-PCS | Mod: S$GLB,,, | Performed by: INTERNAL MEDICINE

## 2019-11-14 PROCEDURE — 3008F BODY MASS INDEX DOCD: CPT | Mod: CPTII,S$GLB,, | Performed by: INTERNAL MEDICINE

## 2019-11-14 PROCEDURE — 3079F PR MOST RECENT DIASTOLIC BLOOD PRESSURE 80-89 MM HG: ICD-10-PCS | Mod: CPTII,S$GLB,, | Performed by: INTERNAL MEDICINE

## 2019-11-14 RX ORDER — TOPIRAMATE 100 MG/1
100 TABLET, FILM COATED ORAL DAILY
Qty: 180 TABLET | Refills: 1 | Status: SHIPPED | OUTPATIENT
Start: 2019-11-14 | End: 2020-03-11 | Stop reason: SDUPTHER

## 2019-11-14 NOTE — LETTER
November 14, 2019      Jaylan Allen MD  [address]  [facility] [address]           [facility]  [address]  [facility] [address]  Phone: [phone]  Fax: [phone]          Patient: Ke Oliver   MR Number: [id number]   YOB: 1970   Date of Visit: 11/14/2019       Dear Dr. Jaylan Allen:    Thank you for referring Ke Oliver to me for evaluation. Attached you will find relevant portions of my assessment and plan of care.    If you have questions, please do not hesitate to call me. I look forward to following Ke Oliver along with you.    Sincerely,    Aby Anaya MD    Enclosure  CC:  No Recipients    If you would like to receive this communication electronically, please contact [email or web address] or [phone] to request more information on [employer] access.    For providers and/or their staff who would like to refer a patient to [facility], please contact us through our one-stop-shop provider referral line, [facility], at [phone].    If you feel you have received this communication in error or would no longer like to receive these types of communications, please e-mail [email or web address]

## 2019-11-14 NOTE — PATIENT INSTRUCTIONS
Start Ozempic once a week. Start with 0.25mg once a week x 4 weeks, then 0.5 mg weekly.       Decrease portions as soon as you start Ozempic. Some nausea in the first 2 weeks is not unusual.     If you get pain across the upper abdomen and around to your back, please call the office.     3 meals a day made up of the following:  Unlimited green vegetables, tomatoes, mushrooms, spaghetti squash, cauliflower, meat, poultry, seafood, eggs and hard cheeses.   Milk and plain yogurt  Dressings, seasonings, condiments, etc should have less than 2 g sugars.   Beans (1-1.5 cups) or nuts (1/4 cup) can have 1 x a day.   1-2 servings of citrus fruits, berries, pineapple or melon a day (1/2 cup)  Avoid fried foods    Limit/avoid sweets, grains, rice, pasta, potatoes, bread, corn, peas, oatmeal, grits, tortillas, crackers, chips    No soda, sweet tea, juices or lemonade. All drinks should be 5 calories or less.     Www.dietdoctor.[employer] for recipes. Moderate carb intake      Helpful tips to survive the holidays:  - Dont save yourself for the meal: Make sure you continue to eat high protein small meals every 3-4 hours to ensure to do not become over-hungry. Avoid temptation by not showing up to a holiday party or gathering hungry.   - Plan ahead. Bring a dish to a party if you know there may not be an appropriate option.   - Choose sugar-free drinks: Stick to water or other sugar-free beverages and make sure you are getting 6-8 cups of fluid each day (but not with meals!). Avoid alcohol, carbonated beverages, and high-fat/high-sugar beverages like hot chocolate and eggnog. Try sugar-free hot cocoa made with skim milk or water, or sugar-free spiced tea to add some holiday flair to your beverage (see sugar-free mulled cider recipe below)  - Take your time: Eat mindfully. Dont graze on food throughout the day. Sit down to enjoy your small meals. Chew slowly and thoroughly. Cut your food into small pieces before eating.  - Listen to  your body: Stop eating as soon as you feel full. Do not feel pressured to try certain (or all) foods or to eat all of the food on your plate. Listen to your hunger cues.   - REMEMBER: Make your holidays about spending time with family and friends instead of focusing gatherings around food.  - Keep up your exercise routine: Make sure you continue to get regular exercise throughout the holiday season. Encourage friends and family to be active by taking a walk together after a meal, to look at holiday lights, or to window-shop.    Good Holiday Meal Options:  - Roasted Turkey, NO skin. Use low sodium broth instead of gravy.   - Stuffed Bell Peppers made WITHOUT bread crumbs or Rice. Try using parmesan cheese instead  - Gumbo, NO rice. Try picking out mostly the meat/seafood and vegetables with little broth.   - Green Bean Casserole made with 98% fat free cream of mushroom soup and crushed almonds/pecans instead of fried onions  - Side salad w/ low fat dressing. Try a different kind of salad maybe use Kale or spinach.   - Roasted non-starchy vegetables like brussel sprouts, broccoli, green beans, zucchini, butternut squash, cauliflower  - Cauliflower Mash (steam or roast cauliflower, puree w/ low fat cheese, dash of fat free milk and 2-3 sprays of I cant believe its not butter spray. Add garlic powder and black pepper to season). Use Low sodium broth instead of gravy.   - Try Loaded Cauliflower Mash (Make cauliflower like above cauliflower mash. Top with diced turkey ho, ¼ cup low fat cheddar cheese and bake @ 350* F for 5-10 minutes, until cheese is melted. Top with minced chives, black pepper and garlic to taste).   - Homemade cranberry sauce using Splenda or another alternative sweetener. Boil fresh cranberries and add splenda to taste. Boil until cranberries break open and then simmer until it reaches the consistency you want (less time for more watery sauce and simmer for longer to create a thicker sauce).    - Deviled eggs: make using low fat [place], mustard, DILL relish (not sweet relish).   - Vegetable tray w/ Greek yogurt Ranch Dip. Mix 1 packet of hidden [place] ranch dip mix w/ 16 oz low fat plain greek yogurt.     Good Holiday Dessert Options:  - High protein Pumpkin Cheesecake (see recipe below)  - Pumpkin Whip (see recipe below)  - Quest Apple Pie or Cinnamon Roll flavored protein bar (warm in microwave for 10-15 seconds)  - Eggnog Protein shake (see recipe below)  - Fresh fruit w/ low fat cheese  - Sugar-free Jello Parfaits. Layer Red and Green sugar-free jello in cups and top w/ 2 tbsp Sugar-free cool-whip    Pumpkin Cheesecake    8 ounces fat free cream cheese, softened   2 scoops of vanilla protein powder (<4 g sugar per serving)   ¼ tsp Fine salt   2 eggs, at room temperature   1/3 cup fat free sour cream  1/3 cup fat free half and half  1 15 -ounce can pure pumpkin puree   1 tablespoon pumpkin pie spice, plus more for dusting   Unsalted nuts, crushed  *Add splenda to taste    Directions     1. Preheat the oven to 300 degrees F. Line 18 muffin cups with paper liners. Sprinkle 1 tsp crushed unsalted nuts at the bottom of each of muffin cup liner.     2. In a large bowl, beat the cream cheese, vanilla protein powder and 1/4 teaspoon fine salt on medium-high speed until smooth and creamy, 2 to 3 minutes. Scrape down the sides, reduce speed to low and beat in the eggs, 1 at a time, until combined. Beat in 1/3 cup fat free sour cream and fat free half and half. Stir in the pumpkin puree and pumpkin pie spice until smooth. Divide evenly among cookie-lined paper cups, filling almost all the way to the top.     3. Bake until the filling is just set, 40 to 45 minutes. A sharp knife inserted into the center will come out moist, but clean. Cool completely in tins on a wire rack. Refrigerate until cold, 4 hours, or overnight. Top with a dusting of pumpkin pie spice.    Recipe altered from the following recipe:  http://[email or web address]/recipes/food-network-adolfo/mini-pumpkin-cheesecakes-recipe.print.html?oc=linkback    Pumpkin Whip    Box of sugar-free vanilla pudding  Can of pumpkin puree  Pumpkin Pie spice (sprinkle to taste)  ½ cup of sugar-free Cool Whip    Directions:  Make sugar-free pudding according to package directions using fat free or 1% milk. Stir in pumpkin and cool whip. Add pumpkin pie spice to taste.     Egg Nog Protein shake    8 oz skim or 1% milk  1 scoop vanilla protein powder  1 tbsp sugar-free vanilla pudding mix  ½ tsp butter flavor extract  ½ tsp rum extract  ½ tsp cinnamon     Shake together or blend with ice and serve.     Sugar-Free Mulled Cider    3 oz diet cran-apple juice  6 oz water  1 packet sugar-free apple cider mix  ½ tsp apple pie spice  ½ tsp butter flavor extract  1 tbsp Sugar-free Syrup    Mix together. Warm if needed and serve w/ orange wedge and cinnamon stick.         If you are interested in bariatric surgery we will need you to either attend an in-person seminar or online seminar. If you choose to attend an in-person seminar this is give once a month and you may call [phone] to arrange your appointment. If you choose to view the online seminar please go to: [email or web address] . The seminar is best viewed on a desktop or laptop computer. Upon completion of the seminar on the last slide you will see the code word comprised of letters and numbers in red and you should jot them down as youll need them to enter into the required form. On that same page youll see the link which will take you to the form. Please enter all the information required, including the code word. You will receive an email confirmation and so will we. Upon receiving this letter you will be called so that your appointments can be arranged. There is also two links for you to print out two packets of information. One is the patient information packet and the other is the nutrition worksheet.  Please complete them and bring to your next appointment in our department.

## 2019-11-14 NOTE — PROGRESS NOTES
Subjective:       Patient ID: Ke Oliver is a 49 y.o. male.    Chief Complaint: Consult    Pt here today for follow up. I last saw him in 2017. He has gained 40+lbs in that time. Now in surgery work up.  Started metformin and diethylpropion at that time.   checked today . Did a little better with diet this month. He did have some starches a few times. Was in surgery work up 2014. Referred back by Corey Craig MD States he was working out for a time, but got out of the habit. Has started back. He has been on topiramate. He states he is still eating too much, but less. Did have episoode of diastolic HF. Relates to flud over load related to NSAIDs. He has been off NSAIDs. Had diarrhea with metformin.     Lab Results       Component                Value               Date                       HGBA1C                   5.7 (H)             06/13/2019                 HGBA1C                   5.6                 08/31/2018                 HGBA1C                   5.8                 04/27/2016            Lab Results       Component                Value               Date                       GLUF                     99                  10/12/2016                 LDLCALC                  145.6               09/28/2019                 CREATININE               1.0                 10/08/2019               · Normal left ventricular systolic function. The estimated ejection fraction is 60%  · Normal right ventricular systolic function.  · Normal LV diastolic function.  · Normal central venous pressure (3 mm Hg).        Review of Systems   Constitutional: Negative for chills and fever.   Respiratory: Positive for apnea. Negative for shortness of breath.         Uses CPAP nightly   Cardiovascular: Positive for leg swelling. Negative for chest pain.   Gastrointestinal: Negative for constipation and diarrhea.        Denies GERD sx. On PPI   Genitourinary: Negative for difficulty urinating and dysuria.   Musculoskeletal:  "Positive for arthralgias. Negative for back pain.   Neurological: Negative for dizziness and light-headedness.   Psychiatric/Behavioral: Negative for dysphoric mood. The patient is not nervous/anxious.        Objective:       /80   Pulse 87   Ht 5' 6" (1.676 m)   Wt (!) 261.3 kg (576 lb)   BMI 92.97 kg/m²     Physical Exam   Constitutional: He is oriented to person, place, and time. He appears well-developed. No distress.   Morbidly obese     HENT:   Head: Normocephalic and atraumatic.   Eyes: Pupils are equal, round, and reactive to light.   Neck: Normal range of motion. Neck supple.   Cardiovascular: Normal rate and regular rhythm.   Distant heart sounds   Pulmonary/Chest: Effort normal and breath sounds normal.   Musculoskeletal: Normal range of motion. He exhibits no edema.   Neurological: He is alert and oriented to person, place, and time.   Skin: Skin is warm and dry.   Psychiatric: He has a normal mood and affect. His behavior is normal. Judgment normal.   Vitals reviewed.      Assessment:       1. Prediabetes    2. Morbid obesity with body mass index of 70 and over in adult    3. Bilateral primary osteoarthritis of knee        Plan:       Ke was seen today for consult.    Diagnoses and all orders for this visit:    Prediabetes    Morbid obesity with body mass index of 70 and over in adult    Bilateral primary osteoarthritis of knee    Other orders  -     topiramate (TOPAMAX) 100 MG tablet; Take 1 tablet (100 mg total) by mouth once daily.  -     semaglutide (OZEMPIC) 0.25 mg or 0.5 mg(2 mg/1.5 mL) PnIj; Inject 0.5 mg into the skin every 7 days.     Start Ozempic once a week. Start with 0.25mg once a week x 4 weeks, then 0.5 mg weekly.       Decrease portions as soon as you start Ozempic. Some nausea in the first 2 weeks is not unusual.     If you get pain across the upper abdomen and around to your back, please call the office.     3 meals a day made up of the following:  Unlimited green " vegetables, tomatoes, mushrooms, spaghetti squash, cauliflower, meat, poultry, seafood, eggs and hard cheeses.   Milk and plain yogurt  Dressings, seasonings, condiments, etc should have less than 2 g sugars.   Beans (1-1.5 cups) or nuts (1/4 cup) can have 1 x a day.   1-2 servings of citrus fruits, berries, pineapple or melon a day (1/2 cup)  Avoid fried foods    Limit/avoid sweets, grains, rice, pasta, potatoes, bread, corn, peas, oatmeal, grits, tortillas, crackers, chips    No soda, sweet tea, juices or lemonade. All drinks should be 5 calories or less.     Www.[email or web address] for recipes. Moderate carb intake      Medical and surgical options discussed today. Seminar info given.      Holiday tips given.

## 2019-12-02 RX ORDER — POTASSIUM CHLORIDE 750 MG/1
10 CAPSULE, EXTENDED RELEASE ORAL DAILY
Qty: 90 CAPSULE | Refills: 3 | Status: SHIPPED | OUTPATIENT
Start: 2019-12-02 | End: 2020-01-31

## 2019-12-02 NOTE — TELEPHONE ENCOUNTER
----- Message from Yajaira Sales sent at 12/2/2019  9:35 AM CST -----  Contact: Pt self Mobile/Home [phone]   Patient is calling in regards to him wanting to know why he was denied for his potassium pills? He said that he was told that he was denied by a pharmacist at [facility]/pharmacy #[id number] - [place], [place] - [address]

## 2019-12-04 ENCOUNTER — OFFICE VISIT (OUTPATIENT)
Dept: INTERNAL MEDICINE | Facility: CLINIC | Age: 49
End: 2019-12-04
Payer: MEDICARE

## 2019-12-04 VITALS
HEART RATE: 84 BPM | BODY MASS INDEX: 50.62 KG/M2 | SYSTOLIC BLOOD PRESSURE: 122 MMHG | HEIGHT: 66 IN | DIASTOLIC BLOOD PRESSURE: 70 MMHG | WEIGHT: 315 LBS

## 2019-12-04 DIAGNOSIS — M17.0 BILATERAL PRIMARY OSTEOARTHRITIS OF KNEE: Primary | ICD-10-CM

## 2019-12-04 DIAGNOSIS — R06.02 SOB (SHORTNESS OF BREATH): ICD-10-CM

## 2019-12-04 PROCEDURE — 99999 PR PBB SHADOW E&M-EST. PATIENT-LVL III: CPT | Mod: PBBFAC,,, | Performed by: INTERNAL MEDICINE

## 2019-12-04 PROCEDURE — 3074F SYST BP LT 130 MM HG: CPT | Mod: CPTII,S$GLB,, | Performed by: INTERNAL MEDICINE

## 2019-12-04 PROCEDURE — 3008F BODY MASS INDEX DOCD: CPT | Mod: CPTII,S$GLB,, | Performed by: INTERNAL MEDICINE

## 2019-12-04 PROCEDURE — 3008F PR BODY MASS INDEX (BMI) DOCUMENTED: ICD-10-PCS | Mod: CPTII,S$GLB,, | Performed by: INTERNAL MEDICINE

## 2019-12-04 PROCEDURE — 99214 OFFICE O/P EST MOD 30 MIN: CPT | Mod: S$GLB,,, | Performed by: INTERNAL MEDICINE

## 2019-12-04 PROCEDURE — 3078F DIAST BP <80 MM HG: CPT | Mod: CPTII,S$GLB,, | Performed by: INTERNAL MEDICINE

## 2019-12-04 PROCEDURE — 99214 PR OFFICE/OUTPT VISIT, EST, LEVL IV, 30-39 MIN: ICD-10-PCS | Mod: S$GLB,,, | Performed by: INTERNAL MEDICINE

## 2019-12-04 PROCEDURE — 3078F PR MOST RECENT DIASTOLIC BLOOD PRESSURE < 80 MM HG: ICD-10-PCS | Mod: CPTII,S$GLB,, | Performed by: INTERNAL MEDICINE

## 2019-12-04 PROCEDURE — 99999 PR PBB SHADOW E&M-EST. PATIENT-LVL III: ICD-10-PCS | Mod: PBBFAC,,, | Performed by: INTERNAL MEDICINE

## 2019-12-04 PROCEDURE — 3074F PR MOST RECENT SYSTOLIC BLOOD PRESSURE < 130 MM HG: ICD-10-PCS | Mod: CPTII,S$GLB,, | Performed by: INTERNAL MEDICINE

## 2019-12-04 NOTE — PROGRESS NOTES
"Subjective:       Patient ID: Ke Oliver is a 49 y.o. male.    Chief Complaint: Follow-up    HPI    Last visit with me October 2019.  Since then seen by bariatrics.  At last visit noted to have worsening lung problems. Given prescription for albuterol and Breo inhaler.  BNP was negative.  Upcoming appointment with bariatrics.    Reports breathing better, hasn't needed albuterol nebs for the last mo. Used Breo for 1 mo. Hasn't used any more of the Voltaren. Waited on this med and when he started the med he was having wheezing, so he left it alone. Using over-the-counter Tylenol. Tried ibuprofen 800 mg as well and had some wheezing.     Reports moving slightly better but not as much as he wants to. Never went back to gym because worried about knee. Hasn't been using Lasix BID, only daily, not wanting to have urinary frequency when seeing friends and family.    Review of Systems   All other systems reviewed and are negative.      Objective:      Physical Exam   Constitutional: He is oriented to person, place, and time. No distress.   Sitting in chair, exam conducted from here   Cardiovascular: Normal rate, regular rhythm and normal heart sounds.   Pulmonary/Chest: Effort normal and breath sounds normal. No stridor. He has no wheezes. He has no rales.   Neurological: He is alert and oriented to person, place, and time.   Skin: He is not diaphoretic.   Psychiatric: He has a normal mood and affect. His behavior is normal.   Nursing note and vitals reviewed.      Vitals:    12/04/19 1549   BP: 122/70   BP Location: Right arm   Patient Position: Sitting   BP Method: X-Large (Manual)   Pulse: 84   Weight: (!) 261 kg (575 lb 6.4 oz)   Height: 5' 6" (1.676 m)     Body mass index is 92.87 kg/m².    RESULTS: Reviewed labs from last 4 months    Assessment:       1. Bilateral primary osteoarthritis of knee    2. SOB (shortness of breath)        Plan:   Ke was seen today for follow-up.    Diagnoses and all orders for " this visit:    Bilateral primary osteoarthritis of knee:  Prior diagnosis, persists.  Encouraged patient to increase exercise to gain more strength and stability around the joint.  Patient planning to return to gym, aiming for 2 times a week.    SOB (shortness of breath):  Prior diagnosis, has improved.  No longer using his albuterol nebulizer.  Has not restarted Breo at this time.  No definitive obstructive disease on PFTs, okay to hold Breo for now since he is not having any wheezing or cough or shortness of breath.  If symptoms restart, then restart Breo once daily.  Continue furosemide twice daily.    Follow up in about 4 months (around 4/4/2020) for follow up visit.  Corey Craig MD  Internal Medicine    Portions of this note were completed using medical dictation software. Please excuse typographical or syntax errors that were missed on review.

## 2019-12-12 RX ORDER — LISINOPRIL 2.5 MG/1
2.5 TABLET ORAL DAILY
Qty: 30 TABLET | Refills: 1 | Status: CANCELLED | OUTPATIENT
Start: 2019-12-12 | End: 2020-02-10

## 2019-12-12 NOTE — TELEPHONE ENCOUNTER
----- Message from Monica Saeed sent at 12/12/2019 10:02 AM CST -----  Contact: [facility]  Prescription Request:     Name of medication: lisinopril (PRINIVIL,ZESTRIL) 2.5 MG tablet    Reason for request: Refill    Pharmacy: [facility]/pharmacy #[id number] - [place] [address]    Requesting 90 day supply.    Thank You

## 2019-12-16 RX ORDER — LISINOPRIL 2.5 MG/1
2.5 TABLET ORAL DAILY
Qty: 90 TABLET | Refills: 3 | Status: SHIPPED | OUTPATIENT
Start: 2019-12-16 | End: 2020-12-16

## 2019-12-16 NOTE — PROGRESS NOTES
Refill Authorization Note     is requesting a refill authorization.    Brief assessment and rationale for refill: REVIEW: not previously prescribed by you                   Pharmacist Review Requested: Yes                     Comments:   Requested Prescriptions   Pending Prescriptions Disp Refills    lisinopril (PRINIVIL,ZESTRIL) 2.5 MG tablet 90 tablet 0     Sig: Take 1 tablet (2.5 mg total) by mouth once daily.       Cardiovascular:  ACE Inhibitors Passed - 12/12/2019 11:49 AM        Passed - Patient is at least 18 years old        Passed - Last BP in normal range with 360 days     BP Readings from Last 3 Encounters:   12/04/19 122/70   11/14/19 128/80   10/03/19 118/60              Passed - Office visit in past 12 months or future 90 days     Recent Outpatient Visits            1 week ago Bilateral primary osteoarthritis of knee    [facility] - Internal Medicine Corey Craig MD    2 months ago Chronic bronchitis, simple    [facility] - Internal Medicine Corey Craig MD    2 months ago Hospital discharge follow-up    [facility] - Internal Medicine [place] Davila DNP    3 months ago STALLINGS (dyspnea on exertion)    [facility] - Internal Medicine Corey Craig MD    6 months ago Annual physical exam    [facility] - Internal Medicine Corey Craig MD          Future Appointments              In 2 months MD [facility] - Bariatric Surgery, [facility]    In 3 months Corey Craig MD [facility] - Internal Medicine, [facility]                Passed - Cr is 1.4 or below and within 360 days     Creatinine   Date Value Ref Range Status   10/08/2019 1.0 0.5 - 1.4 mg/dL Final   09/29/2019 1.0 0.5 - 1.4 mg/dL Final   09/28/2019 0.9 0.5 - 1.4 mg/dL Final              Passed - K in normal range and within 360 days     Potassium   Date Value Ref Range Status   10/08/2019 4.1 3.5 - 5.1 mmol/L Final   09/29/2019 3.7 3.5 - 5.1 mmol/L Final   09/28/2019 4.0 3.5 - 5.1 mmol/L Final              Passed - eGFR within  360 days     eGFR if non    Date Value Ref Range Status   10/08/2019 >60 >60 mL/min/1.73 m^2 Final     Comment:     Calculation used to obtain the estimated glomerular filtration  rate (eGFR) is the CKD-EPI equation.      09/29/2019 >60.0 >60 mL/min/1.73 m^2 Final     Comment:     Calculation used to obtain the estimated glomerular filtration  rate (eGFR) is the CKD-EPI equation.      09/28/2019 >60.0 >60 mL/min/1.73 m^2 Final     Comment:     Calculation used to obtain the estimated glomerular filtration  rate (eGFR) is the CKD-EPI equation.

## 2020-03-11 ENCOUNTER — OFFICE VISIT (OUTPATIENT)
Dept: BARIATRICS | Facility: CLINIC | Age: 50
End: 2020-03-11
Payer: MEDICARE

## 2020-03-11 VITALS
WEIGHT: 315 LBS | HEART RATE: 87 BPM | SYSTOLIC BLOOD PRESSURE: 122 MMHG | DIASTOLIC BLOOD PRESSURE: 84 MMHG | HEIGHT: 66 IN | BODY MASS INDEX: 50.62 KG/M2

## 2020-03-11 DIAGNOSIS — R73.03 PREDIABETES: ICD-10-CM

## 2020-03-11 DIAGNOSIS — E66.01 CLASS 3 SEVERE OBESITY DUE TO EXCESS CALORIES WITH SERIOUS COMORBIDITY AND BODY MASS INDEX (BMI) GREATER THAN OR EQUAL TO 70 IN ADULT: Primary | ICD-10-CM

## 2020-03-11 PROCEDURE — 3008F PR BODY MASS INDEX (BMI) DOCUMENTED: ICD-10-PCS | Mod: CPTII,S$GLB,, | Performed by: INTERNAL MEDICINE

## 2020-03-11 PROCEDURE — 99499 RISK ADDL DX/OHS AUDIT: ICD-10-PCS | Mod: S$GLB,,, | Performed by: INTERNAL MEDICINE

## 2020-03-11 PROCEDURE — 3008F BODY MASS INDEX DOCD: CPT | Mod: CPTII,S$GLB,, | Performed by: INTERNAL MEDICINE

## 2020-03-11 PROCEDURE — 3079F DIAST BP 80-89 MM HG: CPT | Mod: CPTII,S$GLB,, | Performed by: INTERNAL MEDICINE

## 2020-03-11 PROCEDURE — 99999 PR PBB SHADOW E&M-EST. PATIENT-LVL III: CPT | Mod: PBBFAC,,, | Performed by: INTERNAL MEDICINE

## 2020-03-11 PROCEDURE — 3074F PR MOST RECENT SYSTOLIC BLOOD PRESSURE < 130 MM HG: ICD-10-PCS | Mod: CPTII,S$GLB,, | Performed by: INTERNAL MEDICINE

## 2020-03-11 PROCEDURE — 99213 OFFICE O/P EST LOW 20 MIN: CPT | Mod: S$GLB,,, | Performed by: INTERNAL MEDICINE

## 2020-03-11 PROCEDURE — 99999 PR PBB SHADOW E&M-EST. PATIENT-LVL III: ICD-10-PCS | Mod: PBBFAC,,, | Performed by: INTERNAL MEDICINE

## 2020-03-11 PROCEDURE — 3074F SYST BP LT 130 MM HG: CPT | Mod: CPTII,S$GLB,, | Performed by: INTERNAL MEDICINE

## 2020-03-11 PROCEDURE — 3079F PR MOST RECENT DIASTOLIC BLOOD PRESSURE 80-89 MM HG: ICD-10-PCS | Mod: CPTII,S$GLB,, | Performed by: INTERNAL MEDICINE

## 2020-03-11 PROCEDURE — 99213 PR OFFICE/OUTPT VISIT, EST, LEVL III, 20-29 MIN: ICD-10-PCS | Mod: S$GLB,,, | Performed by: INTERNAL MEDICINE

## 2020-03-11 PROCEDURE — 99499 UNLISTED E&M SERVICE: CPT | Mod: S$GLB,,, | Performed by: INTERNAL MEDICINE

## 2020-03-11 RX ORDER — POTASSIUM CHLORIDE 750 MG/1
CAPSULE, EXTENDED RELEASE ORAL
COMMUNITY
Start: 2020-03-04 | End: 2020-12-16

## 2020-03-11 RX ORDER — TOPIRAMATE 100 MG/1
100 TABLET, FILM COATED ORAL DAILY
Qty: 180 TABLET | Refills: 1 | Status: SHIPPED | OUTPATIENT
Start: 2020-03-11 | End: 2020-06-23 | Stop reason: SDUPTHER

## 2020-03-11 NOTE — PROGRESS NOTES
Subjective:       Patient ID: Ke Oliver is a 49 y.o. male.    Chief Complaint: Follow-up    Pt here today for follow up. He has lost 8 lbs since last appt..  Started ozempic last OV.   checked today. He does state he feels more energetic and his appetite is down. Sometimes makes himself eat. Did a little better with diet this month. He did have some starches a few times. Was in surgery work up 2014. Referred back by Corey Craig MD States he was working out for a time, but got out of the habit.  He has been on topiramate. He states he is still eating too much, but less. Did have episoode of diastolic HF. Relates to flud over load related to NSAIDs. He has been off NSAIDs. Had diarrhea with metformin.       Breakfast- Oatmeal, pb, drew seed smoothie (or 2?)  Lunch: may skip.  Dinner: beans and rice (1/2 c). Maybe pasta.   Sn: crackers. Mixed nuts. .     Lab Results       Component                Value               Date                       HGBA1C                   5.7 (H)             06/13/2019                 HGBA1C                   5.6                 08/31/2018                 HGBA1C                   5.8                 04/27/2016            Lab Results       Component                Value               Date                       GLUF                     99                  10/12/2016                 LDLCALC                  145.6               09/28/2019                 CREATININE               1.0                 10/08/2019                Follow-up   Associated symptoms include arthralgias. Pertinent negatives include no chest pain, chills or fever.     Review of Systems   Constitutional: Negative for chills and fever.   Respiratory: Positive for apnea. Negative for shortness of breath.         Uses CPAP nightly   Cardiovascular: Positive for leg swelling. Negative for chest pain.   Gastrointestinal: Negative for constipation and diarrhea.        Denies GERD sx. On PPI   Genitourinary: Negative for  "difficulty urinating and dysuria.   Musculoskeletal: Positive for arthralgias. Negative for back pain.   Neurological: Negative for dizziness and light-headedness.   Psychiatric/Behavioral: Negative for dysphoric mood. The patient is not nervous/anxious.        Objective:       /84   Pulse 87   Ht 5' 6" (1.676 m)   Wt (!) 257.7 kg (568 lb 2 oz)   BMI 91.70 kg/m²     Physical Exam   Constitutional: He is oriented to person, place, and time. He appears well-developed. No distress.   Morbidly obese     HENT:   Head: Normocephalic and atraumatic.   Eyes: Pupils are equal, round, and reactive to light.   Neck: Normal range of motion. Neck supple.   Cardiovascular: Normal rate and regular rhythm.   Distant heart sounds   Pulmonary/Chest: Effort normal and breath sounds normal.   Musculoskeletal: Normal range of motion. He exhibits no edema.   Neurological: He is alert and oriented to person, place, and time.   Skin: Skin is warm and dry.   Psychiatric: He has a normal mood and affect. His behavior is normal. Judgment normal.   Vitals reviewed.      Assessment:       1. Class 3 severe obesity due to excess calories with serious comorbidity and body mass index (BMI) greater than or equal to 70 in adult        Plan:       Ke was seen today for follow-up.    Diagnoses and all orders for this visit:    Class 3 severe obesity due to excess calories with serious comorbidity and body mass index (BMI) greater than or equal to 70 in adult    Prediabetes    Other orders  -     topiramate (TOPAMAX) 100 MG tablet; Take 1 tablet (100 mg total) by mouth once daily.  -     semaglutide (OZEMPIC) 1 mg/dose (2 mg/1.5 mL) PnIj; Inject 1 mg subq weekly.     Ozempic once a week. Start [id number] once a week    Decrease portions as soon as you start Ozempic. Some nausea in the first 2 weeks is not unusual.     If you get pain across the upper abdomen and around to your back, please call the office.       Continune " topiramate      Increase low impact activity as tolerated.  Avoid high impact activity, very heavy lifting or other exercise regimens that may cause discomfort.  3 meals a day made up of the following:  Unlimited green vegetables, tomatoes, mushrooms, spaghetti squash, cauliflower, meat, poultry, seafood, eggs and hard cheeses.   Milk and plain yogurt  Dressings, seasonings, condiments, etc should have less than 2 g sugars.   Beans (1-1.5 cups) or nuts (1/4 cup) can have 1 x a day.   1-2 servings of citrus fruits, berries, pineapple or melon a day (1/2 cup)  Avoid fried foods    Limit/avoid sweets, grains, rice, pasta, potatoes, bread, corn, peas, oatmeal, grits, tortillas, crackers, chips    No soda, sweet tea, juices or lemonade. All drinks should be 5 calories or less.     Www.dietdoctor.com for recipes. Moderate carb intake      30 min recipes given.

## 2020-03-11 NOTE — PATIENT INSTRUCTIONS
Ozempic once a week. Start [id number] once a week    Decrease portions as soon as you start Ozempic. Some nausea in the first 2 weeks is not unusual.     If you get pain across the upper abdomen and around to your back, please call the office.       Continune topiramate      Increase low impact activity as tolerated.  Avoid high impact activity, very heavy lifting or other exercise regimens that may cause discomfort.         3 meals a day made up of the following:  Unlimited green vegetables, tomatoes, mushrooms, spaghetti squash, cauliflower, meat, poultry, seafood, eggs and hard cheeses.   Milk and plain yogurt  Dressings, seasonings, condiments, etc should have less than 2 g sugars.   Beans (1-1.5 cups) or nuts (1/4 cup) can have 1 x a day.   1-2 servings of citrus fruits, berries, pineapple or melon a day (1/2 cup)  Avoid fried foods    Limit/avoid sweets, grains, rice, pasta, potatoes, bread, corn, peas, oatmeal, grits, tortillas, crackers, chips    No soda, sweet tea, juices or lemonade. All drinks should be 5 calories or less.     Www.dietdoctor.com for recipes. Moderate carb intake      Dinner in Under 30 minutes and 400 calories.     Baked Chicken breast with Broccoli Cheese Casserole  2-3 chicken breast (approximately 6-8 oz each)    2 tsp each garlic and herb Mrs. Dash seasoning   2 tsp your favorite creole seasoning  2 tablespoons of balsamic vinegar  2 - 10 oz packages of frozen broccoli  1 egg   ½ cup skim milk  ½ tsp paprika   ½ tsp cayenne pepper   1 can fat free cream of mushroom soup.   1 .5 cups of shredded cheddar cheese    Pre-heat oven to 450 degrees. Toss 2-3 chicken breast (approximately 6-8 oz each) in 2 tsp each garlic and herb Mrs. Dash seasoning, 2 tsp your favorite creole seasoning, and 2 tablespoons of balsamic vinegar. This can also be done up to 24 hours ahead of time, and the chicken can be left in the refrigerator to marinate. Place on a baking sheet sprayed with non-stick cooking  spray and bake on 450 degrees for 10 min, then reduce heat to 350 degrees and cook an addition 10-15 minutes until chicken is cooked through.   While chicken is baking, microwave safe dish, thaw 2 - 10 oz packages of frozen broccoli. Drain any excess water, season with salt, pepper, all-purpose seasoning of your choice. In another bowl, whisk together 1 egg, ½ cup skim milk, ½ tsp paprika, ½ tsp cayenne pepper and 1 can fat free cream of mushroom soup. Combine broccoli, soup mixture, 1 cup of shredded cheddar cheese in baking dish sprayed with cooking spray. Top with remaining cheese. Bake in the oven with chicken for about 20 min or until set cheese is melted and suazo.     Makes 4 servings. 389 calories per serving.10 g fat, 13 g carbs, 54g protein     Sheet Pan [place] Shrimp  1 pound large shrimp, shelled, peeled and deveined.   2 tablespoon olive oil  The zest and the juice of 1 lime  ½ tsp chili powder  ½-1 tsp cayenne pepper  1 tsp cumin  ½ tsp dry oregano  1 red bell pepper  1 green bell pepper  1 red onion  2 cups mushrooms, quartered  1 avocado  Whole jennifer lettuce leaves  Preheat oven to 375 degrees.  In a bowl combine shrimp, lime juice, lime zest, cumin, cayenne pepper, chili powder, and a pinch of salt. Set aside.   Slice peppers and onions into thin strips. Toss in 1 tablespoon of olive oil. Sprinkle with salt and pepper and arrange in a single layer over 1/3 of a large sheet pan  Toss mushrooms with remaining olive oil, oregano, salt and pepper. Arrange in single layer on sheet pan. Place sheet pan in oven for about 15-20 minutes until vegetables are softened, cooked about ¾ of the way through. Remove the sheet pan from oven.  Change setting on oven to low broil.  If needed, rearrange vegetables to make room for shrimp. Arrange the shrimp in a single layer on the sheet pan and return pan to oven. After 5 minutes, flip shrimp. Cook 3-5 additional minutes, or until  Shrimp are opaque.   Serve  shrimp and cooked vegetables on lettuce leaves with sliced avocado.   Makes 4 servings. Calories per servin; 23g fat, 19 g carbohydrates, 27g protein.    Zoodles Primavera with Seasoned Ricotta  8 cups zucchini noodles. These can be purchased already prepared, or you can make them on your own with whole zucchini and a vegetable spiralizer.   1.5 cups cherry tomatoes, quartered  2 cups sliced mushrooms  1 cup chopped fresh broccoli  1 cup frozen peas and carrots  2 cloves garlic, finely chopped  16 oz part skim ricotta cheese  2 oz Neufchatel cream cream cheese, cut into small cubes  Juice and zest of 1 lemon  1 pinch red pepper flakes    2 tsp Italian seasoning  4-5 leaves fresh basil, thinly sliced  1 tablespoon of olive oil  Parmesan cheese for topping (optional)     In a bowl, combine ricotta cheese, 1 tsp Italian seasoning, ½ teaspoon lemon zest. Season with salt and pepper to taste. Mix well. Fold in half of fresh basil. Set aside.   Heat a large skillet to medium high. Add olive oil. Sautee mushrooms until starting to become tender. Season them with salt and pepper while cooking.  Add broccoli and peas and carrots. Reduce heat to medium. Cover pan and cook for 4-5 minutes until broccoli is tender and peas and carrots are warmed through. Add Neufchatel cheese, Italian seasoning, red pepper flakes, 1 tsp lemon zest and lemon juice. Stir until a smooth sauce is formed. Add zucchini noodles (zoodles) to skillet and toss in sauce, then add cherry tomatoes.  Separate zoodle and vegetables into 4 equal portions. Serve each with a ¼ cup serving of seasoned ricotta cheese. Sprinkle with grated parmesan cheese or fresh basil,  if desired.   Makes 4 servings. Calories per servin calories, 32 g carbs, 33 g protein, 18 g fat

## 2020-04-08 ENCOUNTER — OFFICE VISIT (OUTPATIENT)
Dept: INTERNAL MEDICINE | Facility: CLINIC | Age: 50
End: 2020-04-08
Payer: MEDICARE

## 2020-04-08 DIAGNOSIS — E66.01 MORBID OBESITY WITH BODY MASS INDEX OF 70 AND OVER IN ADULT: Chronic | ICD-10-CM

## 2020-04-08 DIAGNOSIS — M17.0 BILATERAL PRIMARY OSTEOARTHRITIS OF KNEE: ICD-10-CM

## 2020-04-08 DIAGNOSIS — I10 ESSENTIAL HYPERTENSION: Primary | ICD-10-CM

## 2020-04-08 DIAGNOSIS — E55.9 VITAMIN D INSUFFICIENCY: ICD-10-CM

## 2020-04-08 DIAGNOSIS — J41.0 SIMPLE CHRONIC BRONCHITIS: ICD-10-CM

## 2020-04-08 PROBLEM — I50.31 ACUTE DIASTOLIC HEART FAILURE: Status: RESOLVED | Noted: 2019-09-27 | Resolved: 2020-04-08

## 2020-04-08 PROCEDURE — 99443 PR PHYSICIAN TELEPHONE EVALUATION 21-30 MIN: CPT | Mod: 95,,, | Performed by: INTERNAL MEDICINE

## 2020-04-08 PROCEDURE — 99443 PR PHYSICIAN TELEPHONE EVALUATION 21-30 MIN: ICD-10-PCS | Mod: 95,,, | Performed by: INTERNAL MEDICINE

## 2020-04-08 RX ORDER — ERGOCALCIFEROL 1.25 MG/1
50000 CAPSULE ORAL WEEKLY
Qty: 12 CAPSULE | Refills: 3 | Status: SHIPPED | OUTPATIENT
Start: 2020-04-08 | End: 2021-02-09

## 2020-04-08 NOTE — PROGRESS NOTES
Subjective:       Patient ID: Ke Oliver is a 49 y.o. male.    Chief Complaint: Knee Pain    Patient is being seen via Audio Visit. Last visit with me 12/2019, since then seen by [facility], has upcoming appointment with them as well.    HPI    Patient reports that he is in his usual state of health.  Continues to have some knee pain and does not have an assistive device for walking.  Is looking into the getting set up with a high-rise walker.    Patient currently does not have any over-the-counter vitamin-D supplement.  Had been taking a supplement in the past.    Patient reports checking his blood pressure at home, on his cough the reading was 139/78 with a pulse of 80.    Review of Systems   All other systems reviewed and are negative.      RESULTS: Reviewed labs from last 9 months    Assessment:       1. Essential hypertension    2. Simple chronic bronchitis    3. Bilateral primary osteoarthritis of knee    4. Morbid obesity with body mass index of 70 and over in adult    5. Vitamin D insufficiency        Plan:   Ke was seen today for knee pain.    Diagnoses and all orders for this visit:    Essential hypertension:  Prior diagnosis, well controlled at home on current management.  Continue current regimen.  Continue to monitor the blood pressure at least once a week and notify the office if levels are elevated, I have sent the patient a message with parameters.    Simple chronic bronchitis:  Prior diagnosis, stable, well controlled on current management. No changes at this time, will continue to monitor.     Bilateral primary osteoarthritis of knee:  Prior diagnosis, persists, had injections with Orthopedics. Encourage exercise with walker.  -     WALKER FOR HOME USE    Morbid obesity with body mass index of 70 and over in adult:  Prior diagnosis, has seen weight loss with GLP1 agonist from Bariatrics, continue to follow up.  -     WALKER FOR HOME USE    Vitamin-D insufficiency:  Prior diagnosis,  mild, restart weekly vitamin-D supplement.  Maintain healthy balance of fruits and vegetables for overall vitamin intake.    Total time spent with patient in telephone evaluation and management service: 25 minutes.   Follow up in about 6 months (around 10/8/2020) for follow up visit, fasting labs 1 week prior.  Corey Craig MD  Internal Medicine    Portions of this note were completed using medical dictation software. Please excuse typographical or syntax errors that were missed on review.      I affirm this call did not originate from a related E/M service provided within the previous 7 days, nor is leading to an E/M service or procedure within the next 24 hours. Each patient to whom he or she provides medical services by telemedicine is:  (1) informed of the relationship between the physician and patient and the respective role of any other health care provider with respect to management of the patient; and (2) notified that he or she may decline to receive medical services by telemedicine and may withdraw from such care at any time.

## 2020-06-16 ENCOUNTER — TELEPHONE (OUTPATIENT)
Dept: BARIATRICS | Facility: CLINIC | Age: 50
End: 2020-06-16

## 2020-06-22 NOTE — PROGRESS NOTES
Subjective:       Patient ID: Ke Oliver is a 50 y.o. male.    Chief Complaint: Follow-up    The patient location is: [facility].   The chief complaint leading to consultation is: Patient presents with:  Follow-up       Visit type: audiovisual    Face to Face time with patient: 13 min  18 min minutes of total time spent on the encounter, which includes face to face time and non-face to face time preparing to see the patient (eg, review of tests), Obtaining and/or reviewing separately obtained history, Documenting clinical information in the electronic or other health record, Independently interpreting results (not separately reported) and communicating results to the patient/family/caregiver, or Care coordination (not separately reported).         Each patient to whom he or she provides medical services by telemedicine is:  (1) informed of the relationship between the physician and patient and the respective role of any other health care provider with respect to management of the patient; and (2) notified that he or she may decline to receive medical services by telemedicine and may withdraw from such care at any time.    Notes:   Pt here today for follow up. He has lost 8 lbs since last appt..  Started ozempic1 mg last OV.   checked today. He does state he feels more energetic and his appetite is down. Sometimes makes himself eat. Did a little better with diet this month. He did have some starches a few times. Was in surgery work up 2014. Referred back by Corey Craig MD States he was working out for a time, but got out of the habit.  He has been on topiramate 100 mg daily. He states he is still eating too much, but less. Did have episoode of diastolic HF. Relates to flud over load related to NSAIDs. He has been off NSAIDs. Had diarrhea with metformin.       Breakfast- eggs, ho, grits, hashbrowns.   Lunch: may skip.  Dinner: 3 piece chicken dinner from [email or web address].    Sn: crackers. Mixed nuts.  Occasional cookies    Lab Results       Component                Value               Date                       HGBA1C                   5.7 (H)             06/13/2019                 HGBA1C                   5.6                 08/31/2018                 HGBA1C                   5.8                 04/27/2016            Lab Results       Component                Value               Date                       GLUF                     99                  10/12/2016                 LDLCALC                  145.6               09/28/2019                 CREATININE               1.0                 10/08/2019                        Prev 568#    Follow-up  Associated symptoms include arthralgias. Pertinent negatives include no chest pain, chills or fever.     Review of Systems   Constitutional: Negative for chills and fever.   Respiratory: Positive for apnea. Negative for shortness of breath.         Uses CPAP nightly   Cardiovascular: Positive for leg swelling. Negative for chest pain.   Gastrointestinal: Negative for constipation and diarrhea.        Denies GERD sx. On PPI   Genitourinary: Negative for difficulty urinating and dysuria.   Musculoskeletal: Positive for arthralgias. Negative for back pain.   Neurological: Negative for dizziness and light-headedness.   Psychiatric/Behavioral: Negative for dysphoric mood. The patient is not nervous/anxious.        Objective:       There were no vitals taken for this visit.    Physical Exam  Vitals signs reviewed.   Constitutional:       General: He is not in acute distress.     Appearance: He is well-developed.      Comments: Morbidly obese     HENT:      Head: Normocephalic and atraumatic.   Pulmonary:      Effort: Pulmonary effort is normal.   Neurological:      Mental Status: He is alert and oriented to person, place, and time.   Psychiatric:         Behavior: Behavior normal.         Judgment: Judgment normal.         Assessment:       1. Morbid obesity with body mass  index of 70 and over in adult    2. Prediabetes        Plan:       Ke was seen today for follow-up.    Diagnoses and all orders for this visit:    Morbid obesity with body mass index of 70 and over in adult    Prediabetes    Other orders  -     topiramate (TOPAMAX) 100 MG tablet; Take 1 tablet (100 mg total) by mouth 2 (two) times daily.  -     semaglutide (OZEMPIC) 1 mg/dose (2 mg/1.5 mL) PnIj; Inject 1 mg subq weekly.         Ozempic once 1mg once a week    Decrease portions as soon as you start Ozempic. Some nausea in the first 2 weeks is not unusual.     If you get pain across the upper abdomen and around to your back, please call the office.       Continune topiramate 100 mg BID.       Increase low impact activity as tolerated.  Avoid high impact activity, very heavy lifting or other exercise regimens that may cause discomfort.  3 meals a day made up of the following:  Unlimited green vegetables, tomatoes, mushrooms, spaghetti squash, cauliflower, meat, poultry, seafood, eggs and hard cheeses.   Milk and plain yogurt  Dressings, seasonings, condiments, etc should have less than 2 g sugars.   Beans (1-1.5 cups) or nuts (1/4 cup) can have 1 x a day.   1-2 servings of citrus fruits, berries, pineapple or melon a day (1/2 cup)  Avoid fried foods    Limit/avoid sweets, grains, rice, pasta, potatoes, bread, corn, peas, oatmeal, grits, tortillas, crackers, chips    No soda, sweet tea, juices or lemonade. All drinks should be 5 calories or less.     Www.dietdoctor.com for recipes. Moderate carb intake      Medical and surgical options discussed today. Seminar info given. \    Quarantine tip given.

## 2020-06-23 ENCOUNTER — OFFICE VISIT (OUTPATIENT)
Dept: BARIATRICS | Facility: CLINIC | Age: 50
End: 2020-06-23
Payer: MEDICARE

## 2020-06-23 DIAGNOSIS — R73.03 PREDIABETES: ICD-10-CM

## 2020-06-23 DIAGNOSIS — E66.01 MORBID OBESITY WITH BODY MASS INDEX OF 70 AND OVER IN ADULT: Primary | ICD-10-CM

## 2020-06-23 PROCEDURE — 99213 PR OFFICE/OUTPT VISIT, EST, LEVL III, 20-29 MIN: ICD-10-PCS | Mod: 95,,, | Performed by: INTERNAL MEDICINE

## 2020-06-23 PROCEDURE — 99213 OFFICE O/P EST LOW 20 MIN: CPT | Mod: 95,,, | Performed by: INTERNAL MEDICINE

## 2020-06-23 PROCEDURE — 99499 UNLISTED E&M SERVICE: CPT | Mod: 95,,, | Performed by: INTERNAL MEDICINE

## 2020-06-23 PROCEDURE — 99499 RISK ADDL DX/OHS AUDIT: ICD-10-PCS | Mod: 95,,, | Performed by: INTERNAL MEDICINE

## 2020-06-23 RX ORDER — TOPIRAMATE 100 MG/1
100 TABLET, FILM COATED ORAL 2 TIMES DAILY
Qty: 180 TABLET | Refills: 1 | Status: SHIPPED | OUTPATIENT
Start: 2020-06-23 | End: 2020-09-30

## 2020-06-23 RX ORDER — SEMAGLUTIDE 1.34 MG/ML
INJECTION, SOLUTION SUBCUTANEOUS
Qty: 9 ML | Refills: 1 | Status: SHIPPED | OUTPATIENT
Start: 2020-06-23 | End: 2020-09-30

## 2020-06-23 NOTE — PATIENT INSTRUCTIONS
Ozempic once 1mg once a week    Decrease portions as soon as you start Ozempic. Some nausea in the first 2 weeks is not unusual.     If you get pain across the upper abdomen and around to your back, please call the office.       Continune topiramate 100 mg BID.       Increase low impact activity as tolerated.  Avoid high impact activity, very heavy lifting or other exercise regimens that may cause discomfort.  3 meals a day made up of the following:  Unlimited green vegetables, tomatoes, mushrooms, spaghetti squash, cauliflower, meat, poultry, seafood, eggs and hard cheeses.   Milk and plain yogurt  Dressings, seasonings, condiments, etc should have less than 2 g sugars.   Beans (1-1.5 cups) or nuts (1/4 cup) can have 1 x a day.   1-2 servings of citrus fruits, berries, pineapple or melon a day (1/2 cup)  Avoid fried foods    Limit/avoid sweets, grains, rice, pasta, potatoes, bread, corn, peas, oatmeal, grits, tortillas, crackers, chips    No soda, sweet tea, juices or lemonade. All drinks should be 5 calories or less.     [email or web address] for recipes. Moderate carb intake      If you are interested in bariatric surgery we will need you to either attend an in-person seminar or online seminar. If you choose to attend an in-person seminar this is give once a month and you may call [phone] to arrange your appointment. If you choose to view the online seminar please go to: [email or web address] . The seminar is best viewed on a desktop or laptop computer. Upon completion of the seminar on the last slide you will see the code word comprised of letters and numbers in red and you should jot them down as youll need them to enter into the required form. On that same page youll see the link which will take you to the form. Please enter all the information required, including the code word. You will receive an email confirmation and so will we. Upon receiving this letter you will be called so that your  appointments can be arranged. There is also two links for you to print out two packets of information. One is the patient information packet and the other is the nutrition worksheet. Please complete them and bring to your next appointment in our department.             [facility] Survival [facility]  Tips to Stay on Track During COVID-19      DO DON'T   Keep up with your food log  Maintaining your caloric intake and diet quality is critical for achieving your health and weight loss goals.  Download the [employer] 8tracks Radio and use [facility] Code 65730 to track food and fluid intake Feel Discouraged - You can do this!  There are a lot of changes happening in our world but don't let them discourage you. Focus on the future and remind yourself of all the work and effort you've put in so far.     Keep protein-rich foods stocked up  buy chicken and turkey in bulk and freeze them, keep dry or canned beans on hand, get the largest quantity of eggs available. Make sure you are getting your required protein intake (between  grams EACH DAY).   Drink fluid during meals or 30 minutes before/after eating  Your regular routine may have changed which may have caused some of your meal or snack times to change.  keep track of when you consume any liquid and time your meals accordingly. This will also help you make sure you are staying hydrated throughout the day   Continue with your protein drinks or bars  Order online or use grocery delivery service. Always make sure you order more WELL BEFORE you are running low, especially now when deliveries may take longer to arrive.  Remember to check to make sure your protein shakes or bars have 4 gms of sugar or less.     Keep table sugar around  You may be more tempted to add it to your drinks or food if it is visible and easily accessible.   Try new recipes  Use this time to experiment in the kitchen and find some different healthy dishes you enjoy - you can use the  nutrition booklet to help guide you.  If you cannot find your copy, please download it from our website @ [email or web address]  Click here to download [facility]'s Nutrition Binder.   Add tough or crunchy foods back into your diet too quickly  Raw veggies are great snack foods but adding them in too quickly after surgery will cause pain and discomfort   Eat your meals slowly and intentionally  You shouldn't have to rush out to be anywhere so really take your time and aim for each meal to last around 20-30 minutes.   Drink sugary/bubbly drinks or alcohol    It may be tempting especially if you are surrounded by others without these limitations, but these beverages will prevent weight loss and cause gastric  pain/discomfort     Listen to your body - try to recognize when you feel full.  Learning your body's signals can be difficult but it is a key step in your weight loss journey. While you are is this process of working on this step, be sure portion out the recommended quantities of foods and meals/snacks to prevent overeating.   Eat your meals using electronics  This is especially difficult at home where your use of computers, phones, and TVs are pretty much unregulated. Designate a meal spot or spots where there is limited distractions and you can focus on your food - maybe your kitchen table or on an outside porch or deck.   Continue taking vitamins and minerals  Take them at the same time each day and keep a log of when you take your supplements to make sure you don't miss any. These supplements are essential for preventing malnutrition and other health problems that will deter your progress.   'Save' your appetite   You may feel like holding out from food as long as possible so you can eat a large meal later on, but your body needs energy throughout the day in order to properly fuel itself and keep you alert.  Try eating small meals throughout the day -  use these meals as mini breaks from work or projects you are doing at home.   Stay active!  It is so important for both your physical and mental health that you get regular physical activity. Even if you can no longer physically go to the gym or to workout classes there are tons of online resources to keep you moving. Incorporate a specific exercise time into your daily home routine and keep a journal of your activity.   Order food delivery or take out   Most places are now offering delivery services, but it can still be difficult to find and choose healthy options. Choose to do a grocery store  or delivery instead- cooking food at home is less expensive then eating out!   Review the resources you've been provided and keep in touch with your healthcare team.  Let them know if you are struggling or experiencing any problems - they are here to help!  Call us to schedule a telehealth visit at [phone] Isolate yourself   It may be called 'social distancing' but that does not mean we can't still connect with one another. Phone calls or group video chats are great ways to keep in touch while staying at home. Any method of getting regular social time with friends and family will help to remind you that you're not in this alone.     Grocery List    Items to Keep Stocked in Your Kitchen  PROTEINS (Lean)    Eggs  Beans (canned and/or dried)  Skinless chicken/turkey   Tuna/Reading (canned and/or pouch)  Tofu or Tempeh  Nain Veggie Burgers  Fish or shrimp (fresh or frozen)  Ground Beef (90% lean)  Steaks  Chobani Greek Yogurt  Cabot Cottage Cheese  Hummus  Low-fat cheeses (Laughing Cow, Baby Bell, mozzarella string cheese)  Fairlife Non-fat Milk    Vegetables (non-starchy)  *veggies can be fresh or frozen    Broccoli   Cauliflower   Carrots   Onions   Cabbage   Radishes   Zucchini   Okra   Greens   Peppers   Spinach   Turnips   Mushrooms   Tomatoes   Celery   Lettuce   Asparagus  Eggplant   Green Onions   Kale     Fruits  *Fruits can be fresh or frozen    Apples  Oranges  Pears  Kiwi  Melon  Berries  Peaches  Unsweetened Applesauce    *Avoid fruits canned In syrup  *Stick to 1-2 servings of fruit/day   Vitamins    Flintstones Complete  Chewables    Super B-Complex tablets with 50 mg Thiamine    Nature's Way liquid Calcium Citrate + Vit D     Sublingual Vitamin B12   Other    Sugar-free Popsicles    Sugar-free Jello    Crystal Lite    Low Fat Condiments     Decaf Coffee/Tea           Foods to Avoid Having Around the House  Butter/Margarine Cookies Candy Chips  Pretzels Grits  Granola Popcorn Hamilton Corn  Bread Alcohol Soda  Pasta  Rice  Cake/Pie Sausage Potatoes Ice Cream                 Tricks to Prevent Emotional Eating During Quarantine      Keep 'trigger foods' out of the house   Keep yourself distracted with work, games, music, or whatever hobby you enjoy. This may be the time to try a new activity!   Try fighting stress with breathing techniques, yoga, meditation, or prayer   Mix up your meals with a variety of dishes   Keep up with your food diary   Call or video chat with a friend or family   Plan your meals for specific time and try for smaller meals throughout the day   Pre-portion all meals and snacks    Step outside for some fresh air or do a quick exercise activity to reset yourself    *Avoid negative thoughts about yourself - if you have a slip-up, you are not a failure. Forgive yourself and focus on learning from it so you can prevent it for the future              Ways to Stay Active While Staying Inside      Quikr India[employer] Videos - free exercise and gym classes at any fitness level   Apps -tons of fitness apps are currently offering free trial periods and offer workouts that don't require equipment   Chores around the house, such as cleaning or gardening   Walk up and down the stairs   Video-chat with your regular workout katia and do an online class together   Make a playlist of your favorite fun  songs and dance around - no need to worry about knowing any serious dance moves, just jump around get your heart rate up!    While you are limited to working out at home, you may find it easier to do short, mini workouts multiple times a day instead of all at once. Try to still get at least 30 minutes of physical activity in each day!   Physical Health = Mental Health    The health of both your mind and body are equally important -be sure you are taking the time to care for both. It is likely that the current health concerns and quarantine mandates caused significant changes to your normal routine. Although this can feel overwhelming and seem difficult to manage, there are ways you can take to manage these feelings and keep your mental and physical health journey on track. Here are some tips for self-care during quarantine:     Meditate, take deep breaths - find any practice that will help you center yourself.   Move around your house throughout the day. Avoid staying in the same seat or room for too long and try to work in an area of your house that get lots of sunlight if possible.   Get fresh air for a bit every day - being outside is a great way to improve your mood! You don't necessarily have to go far from your house. You could even just hang out on your porch for a while or take a walk around the block.    Get good sleep and maintain a regular sleep schedule   Connect with others. While we aren't able to physically be with others right now it is still so important to socialize and interact with other people, even if it is being done remotely.    Keep yourself busy. Make a list of tasks that you want to complete around the house, start a new book, do some art projects, try journaling.

## 2020-08-18 DIAGNOSIS — K21.9 GASTROESOPHAGEAL REFLUX DISEASE, ESOPHAGITIS PRESENCE NOT SPECIFIED: ICD-10-CM

## 2020-08-18 RX ORDER — OMEPRAZOLE 40 MG/1
CAPSULE, DELAYED RELEASE ORAL
Qty: 90 CAPSULE | Refills: 2 | Status: SHIPPED | OUTPATIENT
Start: 2020-08-18 | End: 2021-08-10

## 2020-08-18 NOTE — TELEPHONE ENCOUNTER
Care Due:                  Date            Visit Type   Department     Provider  --------------------------------------------------------------------------------                                             [facility] INTER[facility]  Last Visit: 04-      None         MEDICINE       AWILDA RIVAS                              PHYSICAL -                              ESTABLISHED   [facility] INTERNAL  Next Visit: 10-      PATIENT      MEDICINE       AWILDA RIVAS                                                            Last  Test          Frequency    Reason                     Performed    Due Date  --------------------------------------------------------------------------------    Cr..........  6 months...  furosemide, lisinopril...  10-   04-    K...........  6 months...  furosemide, lisinopril...  10-   04-    Na..........  6 months...  furosemide...............  10-   04-    Powered by [employer]. Reference number: [id number]. 8/18/2020 12:09:59 AM   CDT

## 2020-08-19 NOTE — PROGRESS NOTES
Refill Authorization Note     is requesting a refill authorization.    Brief assessment and rationale for refill: APPROVE: prr          Medication Therapy Plan: CDMR. GERD lco(8/23/19); FLOS    Medication reconciliation completed: No                         Comments:  Automatic Epic Protocol Generated Data:    Requested Prescriptions   Signed Prescriptions Disp Refills    omeprazole (PRILOSEC) 40 MG capsule 90 capsule 2     Sig: TAKE 1 CAPSULE BY MOUTH EVERY DAY       Gastroenterology: Proton Pump Inhibitors Failed - 8/18/2020 12:09 AM        Failed - GERD is on problem list        Passed - Patient is at least 18 years old        Passed - Osteoporosis is not on problem list        Passed - Plavix is not on active medication list        Passed - Office visit in past 6 months or future 90 days.     Recent Outpatient Visits            1 month ago Morbid obesity with body mass index of 70 and over in adult    [facility] - Bariatric Surg [facility] Aby Anaya MD    4 months ago Essential hypertension    [facility] Corey Craig MD    5 months ago Class 3 severe obesity due to excess calories with serious comorbidity and body mass index (BMI) greater than or equal to 70 in adult    [facility] - Bariatric Surg [facility] Aby Anaya MD    8 months ago Bilateral primary osteoarthritis of knee    [facility] Corey Craig MD    10 months ago Chronic bronchitis, simple    [facility] Corey Craig MD          Future Appointments              In 1 month Aby Anaya MD [facility]    In 1 month LAB, APPOINTMENT [facility]    In 1 month Corey Craig MD [facility]                      Appointments  past 12m or future 3m with PCP    Date Provider   Last Visit   4/8/2020 Corey Craig MD   Next Visit   10/8/2020 Corey Craig MD   ED visits in  past 90 days: 0     Note composed:10:00 PM 08/18/2020

## 2020-08-26 DIAGNOSIS — J41.0 CHRONIC BRONCHITIS, SIMPLE: ICD-10-CM

## 2020-08-26 RX ORDER — FLUTICASONE FUROATE AND VILANTEROL TRIFENATATE 100; 25 UG/1; UG/1
POWDER RESPIRATORY (INHALATION)
Qty: 180 EACH | Refills: 1 | Status: SHIPPED | OUTPATIENT
Start: 2020-08-26 | End: 2023-07-07

## 2020-08-26 NOTE — TELEPHONE ENCOUNTER
No new care gaps identified.  Powered by [employer]. Reference number: [id number]. 8/26/2020 12:09:26 AM VINNY

## 2020-08-26 NOTE — PROGRESS NOTES
Refill Authorization Note    is requesting a refill authorization.    Brief assessment and rationale for refill: APPROVE: prr           Medication Therapy Plan: CDMR. approve     Medication reconciliation completed: No                     Comments:          Requested Prescriptions   Pending Prescriptions Disp Refills    BREO ELLIPTA 100-25 mcg/dose diskus inhaler [Pharmacy Med Name: BREO ELLIPTA 100-25 MCG INH] 180 each 1     Sig: INHALE 1 PUFF INTO THE LUNGS ONCE DAILY       Pulmonology:  Combination Products Failed - 8/26/2020  2:18 PM        Failed - Patient has applicable pulmonary diagnosis on their problem list        Passed - Patient is at least 18 years old        Passed - There is a short-acting beta agonist medication on the active medication list        Passed - Last Heart Rate in normal range within 360 days.     Pulse Readings from Last 3 Encounters:   03/11/20 87   12/04/19 84   11/14/19 87             Passed - Office visit in past 12 months or future 90 days.     Recent Outpatient Visits            2 months ago Morbid obesity with body mass index of 70 and over in adult    [facility] [address] - [facility]ariatric S[facility] Aby Anaya MD    4 months ago Essential hypertension    [facility] Corey Craig MD    5 months ago Class 3 severe obesity due to excess calories with serious comorbidity and body mass index (BMI) greater than or equal to 70 in adult    [facility] [address] - [facility]ariatric S[facility] Aby Anaya MD    8 months ago Bilateral primary osteoarthritis of knee    [facility] Corey Craig MD    10 months ago Chronic bronchitis, simple    [facility] Corey Craig MD          Future Appointments              In 3 weeks Aby Anaya MD [address] - Bariatric Surg [facility], [facility]    [facility]    In 1 month MD [facility]  Primary Care [place], [facility]                    Appointments  past 12m or future 3m with PCP    Date Provider   Last Visit   4/8/2020 Corey Craig MD   Next Visit   10/8/2020 Corey Craig MD   ED visits in past 90 days: 0     Note composed:2:19 PM 08/26/2020

## 2020-09-15 ENCOUNTER — PATIENT OUTREACH (OUTPATIENT)
Dept: ADMINISTRATIVE | Facility: OTHER | Age: 50
End: 2020-09-15

## 2020-09-15 NOTE — PROGRESS NOTES
LINKS immunization registry = patient not found  Care Everywhere updated  Health Maintenance updated  Chart reviewed for overdue Proactive [facility] (PHILL) health maintenance testing (CRS, Breast Ca, Diabetic Eye Exam)   Orders entered:N/A

## 2020-09-30 ENCOUNTER — HOSPITAL ENCOUNTER (OUTPATIENT)
Dept: RADIOLOGY | Facility: HOSPITAL | Age: 50
Discharge: HOME | End: 2020-09-30
Attending: INTERNAL MEDICINE
Payer: MEDICARE

## 2020-09-30 ENCOUNTER — OFFICE VISIT (OUTPATIENT)
Dept: INTERNAL MEDICINE | Facility: CLINIC | Age: 50
End: 2020-09-30
Payer: MEDICARE

## 2020-09-30 VITALS
HEART RATE: 77 BPM | WEIGHT: 315 LBS | HEIGHT: 66 IN | DIASTOLIC BLOOD PRESSURE: 76 MMHG | BODY MASS INDEX: 50.62 KG/M2 | SYSTOLIC BLOOD PRESSURE: 155 MMHG

## 2020-09-30 DIAGNOSIS — R06.09 DYSPNEA ON EXERTION: ICD-10-CM

## 2020-09-30 DIAGNOSIS — I10 ESSENTIAL HYPERTENSION: ICD-10-CM

## 2020-09-30 DIAGNOSIS — E66.01 MORBID OBESITY WITH BODY MASS INDEX OF 70 AND OVER IN ADULT: Chronic | ICD-10-CM

## 2020-09-30 DIAGNOSIS — R60.0 BILATERAL LOWER EXTREMITY EDEMA: ICD-10-CM

## 2020-09-30 DIAGNOSIS — Z00.00 ANNUAL PHYSICAL EXAM: Primary | ICD-10-CM

## 2020-09-30 DIAGNOSIS — L03.116 CELLULITIS OF LEFT LEG: ICD-10-CM

## 2020-09-30 DIAGNOSIS — D50.8 OTHER IRON DEFICIENCY ANEMIA: ICD-10-CM

## 2020-09-30 DIAGNOSIS — Z11.9 SCREENING EXAMINATION FOR INFECTIOUS DISEASE: ICD-10-CM

## 2020-09-30 DIAGNOSIS — J41.0 CHRONIC BRONCHITIS, SIMPLE: ICD-10-CM

## 2020-09-30 PROCEDURE — 99999 PR PBB SHADOW E&M-EST. PATIENT-LVL IV: ICD-10-PCS | Mod: PBBFAC,,, | Performed by: INTERNAL MEDICINE

## 2020-09-30 PROCEDURE — 99499 UNLISTED E&M SERVICE: CPT | Mod: S$GLB,,, | Performed by: INTERNAL MEDICINE

## 2020-09-30 PROCEDURE — 71046 X-RAY EXAM CHEST 2 VIEWS: CPT | Mod: TC

## 2020-09-30 PROCEDURE — 99499 RISK ADDL DX/OHS AUDIT: ICD-10-PCS | Mod: S$GLB,,, | Performed by: INTERNAL MEDICINE

## 2020-09-30 PROCEDURE — 3008F PR BODY MASS INDEX (BMI) DOCUMENTED: ICD-10-PCS | Mod: CPTII,S$GLB,, | Performed by: INTERNAL MEDICINE

## 2020-09-30 PROCEDURE — 3077F PR MOST RECENT SYSTOLIC BLOOD PRESSURE >= 140 MM HG: ICD-10-PCS | Mod: CPTII,S$GLB,, | Performed by: INTERNAL MEDICINE

## 2020-09-30 PROCEDURE — 99214 OFFICE O/P EST MOD 30 MIN: CPT | Mod: S$GLB,,, | Performed by: INTERNAL MEDICINE

## 2020-09-30 PROCEDURE — 99214 PR OFFICE/OUTPT VISIT, EST, LEVL IV, 30-39 MIN: ICD-10-PCS | Mod: S$GLB,,, | Performed by: INTERNAL MEDICINE

## 2020-09-30 PROCEDURE — 71046 XR CHEST PA AND LATERAL: ICD-10-PCS | Mod: 26,,, | Performed by: RADIOLOGY

## 2020-09-30 PROCEDURE — 99999 PR PBB SHADOW E&M-EST. PATIENT-LVL IV: CPT | Mod: PBBFAC,,, | Performed by: INTERNAL MEDICINE

## 2020-09-30 PROCEDURE — 3078F PR MOST RECENT DIASTOLIC BLOOD PRESSURE < 80 MM HG: ICD-10-PCS | Mod: CPTII,S$GLB,, | Performed by: INTERNAL MEDICINE

## 2020-09-30 PROCEDURE — 3077F SYST BP >= 140 MM HG: CPT | Mod: CPTII,S$GLB,, | Performed by: INTERNAL MEDICINE

## 2020-09-30 PROCEDURE — 3078F DIAST BP <80 MM HG: CPT | Mod: CPTII,S$GLB,, | Performed by: INTERNAL MEDICINE

## 2020-09-30 PROCEDURE — 71046 X-RAY EXAM CHEST 2 VIEWS: CPT | Mod: 26,,, | Performed by: RADIOLOGY

## 2020-09-30 PROCEDURE — 3008F BODY MASS INDEX DOCD: CPT | Mod: CPTII,S$GLB,, | Performed by: INTERNAL MEDICINE

## 2020-09-30 RX ORDER — DICLOFENAC SODIUM 75 MG/1
75 TABLET, DELAYED RELEASE ORAL 2 TIMES DAILY
COMMUNITY
Start: 2020-08-22 | End: 2020-12-22

## 2020-09-30 RX ORDER — ALBUTEROL SULFATE 0.63 MG/3ML
0.63 SOLUTION RESPIRATORY (INHALATION) EVERY 6 HOURS PRN
Qty: 1 BOX | Refills: 11 | Status: SHIPPED | OUTPATIENT
Start: 2020-09-30 | End: 2021-04-12 | Stop reason: SDUPTHER

## 2020-09-30 RX ORDER — CEPHALEXIN 500 MG/1
500 CAPSULE ORAL EVERY 8 HOURS
Qty: 21 CAPSULE | Refills: 0 | Status: SHIPPED | OUTPATIENT
Start: 2020-09-30 | End: 2020-10-07

## 2020-09-30 NOTE — PROGRESS NOTES
"Subjective:       Patient ID: Ke Oliver is a 50 y.o. male.    Chief Complaint: Annual Exam      Last visit with me 4/8/2020.     HPI    Was taking medications, but stopped Topamax and shots 3 weeks ago. Hadn't been helping per patient. No changes. Thinks retaining fluid, taking fluid pills and pressure pills, and antacids. Taking daily. Eating habits are the same. Not emotionally eating. Last few months has been eating some fast foods for last month 3x/wk.     2 weeks ago had appointment with Bariatrics. This week not feeling well either. Feeling shortness of breath again. Had breathing machine with nebs that were helping. Using Breo daily, this week started taking fluid pills two times a day (was doing 2 pills daily). Not doing as many vegetables at home. Not as much shortness of breath as last year.     "The things I choose to eat I know are wrong to eat". E.g. pastas and carbs. Not eating late at night. Not sleeping well at night, tossing and anxiety. Was taking Tylenol regularly in past, noted black stool. Knees have been ok. "I feel bigger, but the knee's not bothering me".     Got cellulitis from scratching skin with box about 3-4 days ago in evening. L leg calf is always swollen. L>R with swelling.                 Review of Systems   All other systems reviewed and are negative.      Objective:      Physical Exam  Vitals signs and nursing note reviewed.   Constitutional:       General: He is not in acute distress.     Appearance: He is not diaphoretic.   HENT:      Head: Normocephalic and atraumatic.      Right Ear: External ear normal.      Left Ear: External ear normal.      Mouth/Throat:      Mouth: Mucous membranes are moist.      Pharynx: No oropharyngeal exudate or posterior oropharyngeal erythema.   Eyes:      General:         Right eye: No discharge.         Left eye: No discharge.      Pupils: Pupils are equal, round, and reactive to light.   Neck:      Musculoskeletal: Normal range of motion. " "     Thyroid: No thyromegaly.   Cardiovascular:      Rate and Rhythm: Normal rate and regular rhythm.      Heart sounds: Normal heart sounds.   Pulmonary:      Effort: Pulmonary effort is normal.      Breath sounds: Normal breath sounds. No stridor. No wheezing or rales.   Musculoskeletal:         General: No tenderness.      Right lower leg: No edema.      Left lower leg: No edema.   Lymphadenopathy:      Cervical: No cervical adenopathy.   Skin:     General: Skin is warm and dry.      Findings: Lesion (excoriation LLE anterior shin) present. No rash.   Neurological:      General: No focal deficit present.      Mental Status: He is alert and oriented to person, place, and time.   Psychiatric:         Mood and Affect: Mood normal.         Behavior: Behavior normal.         Vitals:    09/30/20 0851   BP: (!) 155/76   BP Location: Right arm   Patient Position: Sitting   BP Method: Large (Automatic)   Pulse: 77   Weight: (!) 271 kg (597 lb 7.2 oz)   Height: 5' 6" (1.676 m)     Body mass index is 96.43 kg/m².    RESULTS: Reviewed labs from last 12 months    Assessment:       1. Annual physical exam    2. Cellulitis of left leg    3. Chronic bronchitis, simple    4. Other iron deficiency anemia    5. Essential hypertension    6. Bilateral lower extremity edema    7. Dyspnea on exertion    8. Screening examination for infectious disease    9. Morbid obesity with body mass index of 70 and over in adult        Plan:   Ke was seen today for annual exam.    Diagnoses and all orders for this visit:    Annual physical exam:  Age-appropriate health screening reviewed, indicated tests ordered.     Cellulitis of left leg:  New problem, refer to Home Health wound care, start Keflex, clean area with soap and water.  -     cephALEXin (KEFLEX) 500 MG capsule; Take 1 capsule (500 mg total) by mouth every 8 (eight) hours. for 7 days  -     Ambulatory referral/consult to Home Health; Future    Chronic bronchitis, simple  -     " NEBULIZER FOR HOME USE  -     albuterol (ACCUNEB) 0.63 mg/3 mL Nebu; Take 3 mLs (0.63 mg total) by nebulization every 6 (six) hours as needed. Rescue    Other iron deficiency anemia  -     CBC Without Differential; Future  -     Ferritin; Future  -     Iron and TIBC; Future    Essential hypertension:  Prior diagnosis, not well controlled, likely related to uncontrolled obstructive sleep apnea and weight gain, continue to monitor and refer to Bariatrics surgery.  -     Comprehensive metabolic panel; Future  -     Magnesium; Future  -     TSH; Future  -     Lipid Panel; Future    Bilateral lower extremity edema  -     Brain Natriuretic Peptide; Future    Dyspnea on exertion  -     X-Ray Chest PA And Lateral; Future  -     Ambulatory referral/consult to Home Health; Future    Screening examination for infectious disease  -     HIV 1/2 Ag/Ab (4th Gen); Future  -     Hepatitis C Antibody; Future    Morbid obesity with body mass index of 70 and over in adult:  Seen by Bariatrics, no longer using the prescribed medications because didn't feel they were working. I will refer to Bariatrics Surgery for evaluation. Unclear if patient has good insight into his eating habits and patterns.  -     Vitamin D; Future  -     Ambulatory referral/consult to Home Health; Future      Follow up in about 6 months (around 3/30/2021) for follow up visit.  Corey Craig MD, FACP [facility]    Portions of this note were completed using medical dictation software. Please excuse typographical or syntax errors that were missed on review.

## 2020-09-30 NOTE — Clinical Note
Didn't know who to route this to; patient was being seen by bariatric medicine but reports the medicines were not helpful and has stopped using them.  He was referred to bariatric surgery in the past but has not had an appointment as of yet, I told him I would be setting up a new referral.  Order has been placed.  Please reach out to the patient to help coordinate for initial consult with bariatric surgery team for evaluation. Thanks!  Portions of this note were completed using medical dictation software. Please excuse typographical or syntax errors that were missed on review.

## 2020-10-08 ENCOUNTER — TELEPHONE (OUTPATIENT)
Dept: INTERNAL MEDICINE | Facility: CLINIC | Age: 50
End: 2020-10-08

## 2020-10-10 DIAGNOSIS — I50.31 ACUTE DIASTOLIC HEART FAILURE: ICD-10-CM

## 2020-10-10 NOTE — TELEPHONE ENCOUNTER
No new care gaps identified.  Powered by [employer]. Reference number: [id number]. 10/10/2020 9:08:42 AM   NADJAT

## 2020-10-11 PROCEDURE — G0180 PR HOME HEALTH MD CERTIFICATION: ICD-10-PCS | Mod: ,,, | Performed by: INTERNAL MEDICINE

## 2020-10-11 PROCEDURE — G0180 MD CERTIFICATION HHA PATIENT: HCPCS | Mod: ,,, | Performed by: INTERNAL MEDICINE

## 2020-10-12 RX ORDER — FUROSEMIDE 40 MG/1
TABLET ORAL
Qty: 180 TABLET | Refills: 1 | Status: SHIPPED | OUTPATIENT
Start: 2020-10-12 | End: 2021-03-30

## 2020-10-12 NOTE — PROGRESS NOTES
Refill Routing Note   Medication(s) are not appropriate for processing by [facility] for the following reason(s):     - Required laboratory values are abnormal    [facility] actions taken in this encounter: Defer       Medication Therapy Plan: CDMR.  BP elevated at LOV; patient recently seen; future office visit scheduled  Medication reconciliation completed: No   Automatic Epic Generated Protocol Data:        Requested Prescriptions   Pending Prescriptions Disp Refills    furosemide (LASIX) 40 MG tablet [Pharmacy Med Name: FUROSEMIDE 40 MG TABLET] 180 tablet 1     Sig: TAKE 1 TABLET BY MOUTH TWICE A DAY       Cardiovascular:  Diuretics - Loop Failed - 10/10/2020  9:08 AM        Failed - Last BP in normal range within 360 days     BP Readings from Last 3 Encounters:   09/30/20 (!) 155/76   03/11/20 122/84   12/04/19 122/70              Passed - Patient is at least 18 years old        Passed - Office Visit within last 12 months or future 90 days.     Recent Outpatient Visits            1 week ago Annual physical exam    [facility] Corey Craig MD    3 months ago Morbid obesity with body mass index of 70 and over in adult    [facility] Aby Anaya MD    6 months ago Essential hypertension    [facility] Corey Cragi MD    7 months ago Class 3 severe obesity due to excess calories with serious comorbidity and body mass index (BMI) greater than or equal to 70 in adult    [facility] Aby Anaya MD    10 months ago Bilateral primary osteoarthritis of knee    [facility] Corey Craig MD          Future Appointments              In 3 days [place] [address] [facility], Surendra Landeros    In 3 days Aby Anaya MD [facility], Surendra Landeros    In 4 months [facility] Craig [facility]                Passed - K in  normal range and within 180 days     Potassium   Date Value Ref Range Status   09/30/2020 3.9 3.5 - 5.1 mmol/L Final   10/08/2019 4.1 3.5 - 5.1 mmol/L Final   09/29/2019 3.7 3.5 - 5.1 mmol/L Final              Passed - Na is between 130 and 148 and within 180 days     Sodium   Date Value Ref Range Status   09/30/2020 140 136 - 145 mmol/L Final   10/08/2019 141 136 - 145 mmol/L Final   09/29/2019 142 136 - 145 mmol/L Final              Passed - Cr is 1.3 or below and within 180 days     Creatinine   Date Value Ref Range Status   09/30/2020 0.8 0.5 - 1.4 mg/dL Final   10/08/2019 1.0 0.5 - 1.4 mg/dL Final   09/29/2019 1.0 0.5 - 1.4 mg/dL Final              Passed - eGFR within 180 days     eGFR if non    Date Value Ref Range Status   09/30/2020 >60.0 >60 mL/min/1.73 m^2 Final     Comment:     Calculation used to obtain the estimated glomerular filtration  rate (eGFR) is the CKD-EPI equation.      10/08/2019 >60 >60 mL/min/1.73 m^2 Final     Comment:     Calculation used to obtain the estimated glomerular filtration  rate (eGFR) is the CKD-EPI equation.      09/29/2019 >60.0 >60 mL/min/1.73 m^2 Final     Comment:     Calculation used to obtain the estimated glomerular filtration  rate (eGFR) is the CKD-EPI equation.        eGFR if    Date Value Ref Range Status   09/30/2020 >60.0 >60 mL/min/1.73 m^2 Final   10/08/2019 >60 >60 mL/min/1.73 m^2 Final   09/29/2019 >60.0 >60 mL/min/1.73 m^2 Final                    Appointments  past 12m or future 3m with PCP    Date Provider   Last Visit   9/30/2020 Corey Craig MD   Next Visit   3/8/2021 Corey Craig MD   ED visits in past 90 days: 0        Note composed:8:55 PM 10/11/2020

## 2020-10-14 ENCOUNTER — CLINICAL SUPPORT (OUTPATIENT)
Dept: BARIATRICS | Facility: CLINIC | Age: 50
End: 2020-10-14
Payer: MEDICARE

## 2020-10-14 ENCOUNTER — OFFICE VISIT (OUTPATIENT)
Dept: BARIATRICS | Facility: CLINIC | Age: 50
End: 2020-10-14
Payer: MEDICARE

## 2020-10-14 VITALS
OXYGEN SATURATION: 79 % | DIASTOLIC BLOOD PRESSURE: 78 MMHG | BODY MASS INDEX: 98.12 KG/M2 | HEART RATE: 91 BPM | SYSTOLIC BLOOD PRESSURE: 130 MMHG | WEIGHT: 315 LBS

## 2020-10-14 VITALS — WEIGHT: 315 LBS | BODY MASS INDEX: 98.07 KG/M2

## 2020-10-14 DIAGNOSIS — R73.03 PREDIABETES: ICD-10-CM

## 2020-10-14 DIAGNOSIS — E66.01 MORBID OBESITY WITH BODY MASS INDEX OF 70 AND OVER IN ADULT: Primary | ICD-10-CM

## 2020-10-14 PROCEDURE — 99499 UNLISTED E&M SERVICE: CPT | Mod: S$GLB,,, | Performed by: INTERNAL MEDICINE

## 2020-10-14 PROCEDURE — 99999 PR PBB SHADOW E&M-EST. PATIENT-LVL II: CPT | Mod: PBBFAC,,, | Performed by: DIETITIAN, REGISTERED

## 2020-10-14 PROCEDURE — 99999 PR PBB SHADOW E&M-EST. PATIENT-LVL IV: ICD-10-PCS | Mod: PBBFAC,,, | Performed by: INTERNAL MEDICINE

## 2020-10-14 PROCEDURE — 3008F PR BODY MASS INDEX (BMI) DOCUMENTED: ICD-10-PCS | Mod: CPTII,S$GLB,, | Performed by: INTERNAL MEDICINE

## 2020-10-14 PROCEDURE — 99499 UNLISTED E&M SERVICE: CPT | Mod: S$GLB,,, | Performed by: DIETITIAN, REGISTERED

## 2020-10-14 PROCEDURE — 3008F BODY MASS INDEX DOCD: CPT | Mod: CPTII,S$GLB,, | Performed by: INTERNAL MEDICINE

## 2020-10-14 PROCEDURE — 99213 OFFICE O/P EST LOW 20 MIN: CPT | Mod: S$GLB,,, | Performed by: INTERNAL MEDICINE

## 2020-10-14 PROCEDURE — 99499 NO LOS: ICD-10-PCS | Mod: S$GLB,,, | Performed by: DIETITIAN, REGISTERED

## 2020-10-14 PROCEDURE — 3075F SYST BP GE 130 - 139MM HG: CPT | Mod: CPTII,S$GLB,, | Performed by: INTERNAL MEDICINE

## 2020-10-14 PROCEDURE — 3078F PR MOST RECENT DIASTOLIC BLOOD PRESSURE < 80 MM HG: ICD-10-PCS | Mod: CPTII,S$GLB,, | Performed by: INTERNAL MEDICINE

## 2020-10-14 PROCEDURE — 99999 PR PBB SHADOW E&M-EST. PATIENT-LVL II: ICD-10-PCS | Mod: PBBFAC,,, | Performed by: DIETITIAN, REGISTERED

## 2020-10-14 PROCEDURE — 99999 PR PBB SHADOW E&M-EST. PATIENT-LVL IV: CPT | Mod: PBBFAC,,, | Performed by: INTERNAL MEDICINE

## 2020-10-14 PROCEDURE — 3075F PR MOST RECENT SYSTOLIC BLOOD PRESS GE 130-139MM HG: ICD-10-PCS | Mod: CPTII,S$GLB,, | Performed by: INTERNAL MEDICINE

## 2020-10-14 PROCEDURE — 99499 RISK ADDL DX/OHS AUDIT: ICD-10-PCS | Mod: S$GLB,,, | Performed by: INTERNAL MEDICINE

## 2020-10-14 PROCEDURE — 3078F DIAST BP <80 MM HG: CPT | Mod: CPTII,S$GLB,, | Performed by: INTERNAL MEDICINE

## 2020-10-14 PROCEDURE — 99213 PR OFFICE/OUTPT VISIT, EST, LEVL III, 20-29 MIN: ICD-10-PCS | Mod: S$GLB,,, | Performed by: INTERNAL MEDICINE

## 2020-10-14 RX ORDER — EMPAGLIFLOZIN 10 MG/1
10 TABLET, FILM COATED ORAL DAILY
Qty: 90 TABLET | Refills: 0 | Status: SHIPPED | OUTPATIENT
Start: 2020-10-14 | End: 2021-01-11

## 2020-10-14 RX ORDER — SEMAGLUTIDE 1.34 MG/ML
INJECTION, SOLUTION SUBCUTANEOUS
Qty: 9 ML | Refills: 1 | Status: SHIPPED | OUTPATIENT
Start: 2020-10-14 | End: 2021-01-13 | Stop reason: SDUPTHER

## 2020-10-14 RX ORDER — TOPIRAMATE 100 MG/1
100 TABLET, FILM COATED ORAL 2 TIMES DAILY
Qty: 60 TABLET | Refills: 3
Start: 2020-10-14 | End: 2021-01-13 | Stop reason: SDUPTHER

## 2020-10-14 NOTE — PROGRESS NOTES
Subjective:       Patient ID: Ke Oliver is a 50 y.o. male.    Chief Complaint: Follow-up    Pt here today for follow up. He has gained 41 lbs since last appt..  Started ozempic last OV.   checked today. He is back in surgery work up, but has to lose 100 lbs first. He stopped topiramate and at beginning of Sept.  States he had been trying to make some changes, and wasn't feeling a difference from the meds. States now that he has been off of them is aware of the weight gain. Has gained 10 lbs in 2 weeks.        Had diarrhea with metformin.     Discussed recs for 1500 teresa diet with RD.     Lab Results       Component                Value               Date                       HGBA1C                   5.7 (H)             06/13/2019                 HGBA1C                   5.6                 08/31/2018                 HGBA1C                   5.8                 04/27/2016            Lab Results       Component                Value               Date                       GLUF                     99                  10/12/2016                 LDLCALC                  140.2               09/30/2020                 CREATININE               0.8                 09/30/2020               Follow-up  Associated symptoms include arthralgias. Pertinent negatives include no chest pain, chills or fever.     Review of Systems   Constitutional: Negative for chills and fever.   Respiratory: Positive for apnea. Negative for shortness of breath.         Uses CPAP nightly   Cardiovascular: Positive for leg swelling. Negative for chest pain.   Gastrointestinal: Negative for constipation and diarrhea.        Denies GERD sx. On PPI   Genitourinary: Negative for difficulty urinating and dysuria.   Musculoskeletal: Positive for arthralgias. Negative for back pain.   Neurological: Negative for dizziness and light-headedness.   Psychiatric/Behavioral: Negative for dysphoric mood. The patient is not nervous/anxious.         Objective:       /78   Pulse 91   Wt (!) 275.7 kg (607 lb 14.4 oz)   SpO2 (!) 79%   BMI 98.12 kg/m²     Physical Exam  Vitals signs reviewed.   Constitutional:       General: He is not in acute distress.     Appearance: He is well-developed.      Comments: Morbidly obese     HENT:      Head: Normocephalic and atraumatic.   Eyes:      Pupils: Pupils are equal, round, and reactive to light.   Neck:      Musculoskeletal: Normal range of motion and neck supple.   Cardiovascular:      Rate and Rhythm: Normal rate and regular rhythm.      Comments: Distant heart sounds  Pulmonary:      Effort: Pulmonary effort is normal.      Breath sounds: Normal breath sounds.   Musculoskeletal: Normal range of motion.   Skin:     General: Skin is warm and dry.   Neurological:      Mental Status: He is alert and oriented to person, place, and time.   Psychiatric:         Behavior: Behavior normal.         Judgment: Judgment normal.         Assessment:       1. Morbid obesity with body mass index of 70 and over in adult    2. Prediabetes        Plan:       Ke was seen today for follow-up.    Diagnoses and all orders for this visit:    Morbid obesity with body mass index of 70 and over in adult    Prediabetes    Other orders  -     semaglutide (OZEMPIC) 1 mg/dose (2 mg/1.5 mL) PnIj; Inject 1 mg subq weekly  -     topiramate (TOPAMAX) 100 MG tablet; Take 1 tablet (100 mg total) by mouth 2 (two) times daily.  -     empagliflozin (JARDIANCE) 10 mg tablet; Take 1 tablet (10 mg total) by mouth once daily.     Ozempic once a week. Start dlbw6ly once a week    Decrease portions as soon as you start Ozempic. Some nausea in the first 2 weeks is not unusual.     If you get pain across the upper abdomen and around to your back, please call the office.     Patient was informed that topiramate is used for migraine prevention and seizures. Weight loss is a common side effect that is well documented. S/he understands this. S/he was  informed of the potential side effects such as serious and possibly fatal rash in which case the medication should be discontinued immediately. Paresthesias, forgetfulness, fatigue, kidney stones, GI symptoms, and changes in lab values such as electrolytes, blood counts and kidney function.      Start topiramate  in the evening for 1 week, then morning and evening.         Increase low impact activity as tolerated.  Avoid high impact activity, very heavy lifting or other exercise regimens that may cause discomfort.  3 meals a day made up of the following:  Unlimited green vegetables, tomatoes, mushrooms, spaghetti squash, cauliflower, meat, poultry, seafood, eggs and hard cheeses.   Milk and plain yogurt  Dressings, seasonings, condiments, etc should have less than 2 g sugars.   Beans (1-1.5 cups) or nuts (1/4 cup) can have 1 x a day.   1-2 servings of citrus fruits, berries, pineapple or melon a day (1/2 cup)  Avoid fried foods    Limit/avoid sweets, grains, rice, pasta, potatoes, bread, corn, peas, oatmeal, grits, tortillas, crackers, chips    No soda, sweet tea, juices or lemonade. All drinks should be 5 calories or less.     Www.dietdoctor.com for recipes. Moderate carb intake      30 min recipes given.

## 2020-10-14 NOTE — PATIENT INSTRUCTIONS
Bariatric Diet Grocery List      High in Protein:   ? Canned tuna or chicken (packed in water)  ? Lean ground turkey breast or ground round  ? Turkey or chicken (no skin)  ? Lean pork or beef   ? Scrambled, poached, or boiled eggs  ? Baked or broiled fish and seafood (not fried!)  ? Beans, edamame and lentils  ? Low fat deli meats: turkey, chicken, ham, roast beef  ? 1% or Skim Milk, Lactaid, or Soymilk  ? Low-fat cheese, cottage cheese, mozzarella string cheese, ricotta  ? Light yogurt, FF/SF frozen yogurt, custard, SF pudding  ? Protein drinks and protein bars with 0-4 grams sugar     Fruits, Vegetables and Snacks   - Green beans, broccoli, cauliflower, spinach, asparagus, carrots, lima beans, yellow squash, zucchini, etc.  - Apple, pineapple, peach, grapes, banana, watermelon, oranges, etc.  - Fruit canned in its own juice or in water (not in syrup)  - Raw veggies  - Lettuce: dark greens like spinach and Phillip  - Unsalted Nuts  - Warner Links beef jerky     Fluids:   Skim/1% milk, Lactaid, Soymilk  Sugar-free beverages  (decaf and non-carbonated)  Decaf coffee & decaf tea   Water     Healthy Eating on the go ~ Pre and Post-Surgery     (*) Means this meal is appropriate for pre-surgery consumption because it is >30g of protein per meal. Post-surgery, may want to split meal in half or save a small portion for a snack.     [facility] Kitchen  Item  Calories  Protein (g)   Mediterranean Lamb [place]  350 22   *Chicken Kabobs 290 41   *[place] Kabobs 330 40   Shrimp Kabobs 170 23   *Steak Kabobs 490 42   *Cauliflower Rice Bowl- chicken (salmon, lamb)  490 41   Mediterranean Chicken 260 34   *Protein Power Plate 520 41   Side items: Greek side salad, fruit salad, roasted vegetables, baked falafel       Amess   Item  Calories Protein (g)   Artisan Grilled Chicken [place], no bun  <380 36   *[place] Grilled Chicken Salad, no dressing <320 42   Double Hamburger, no bun <440 25   Side items: apple slices, side salad        Laurens   Item Calories Protein (g)   Grilled Chicken [place], no bun  <370 34   [place] Chicken Salad, half size, no dressing 320  22   Apple Pecan Chicken Salad, half size, no dressing  340 20   Large Chili 250 21   Side items: garden or caesar side salad (no croutons), apple bites       Luisito Funk   Item Calories Protein (g)   Grilled Chicken [place], no bun <470 37   Whopper Jr., no bun <310 13   Double Hamburger, no bun  <390 23   *Chicken Garden Salad, no croutons, use ½ packet of dressing  <520 40   Side items: garden side salad,         Chick-mehdi-A  Item Calories Protein (g)   Grilled Chicken [place], no bun  <310 29   Grilled Nuggets, 8 count 140 25   Grilled Chicken Market Salad with light balsamic dressing 330 28   Small Chicken Tortilla Soup, no tortilla strips 340 23   Side items: side salad, superfood side salad, carrot raisin salad, fruit cup,          Sonic  Item Calories Protein (g)   Jr. Jorge, no bun  <330 15   Classic Grilled Chicken [place], no bun <490 31   Hearty [place], no fritos 140 10       Alexs   Item Calories Protein (g)   Blackened Chicken Tenders, 3 piece 170 26   Side items: green beans             Ramez Veliz   Item Calories Protein (g)   Unwich: any sandwich made wrapped in lettuce instead of bread. Ask for no holbrook.      Original Roast Beef [place]wich 260 16   Stanley Breast Unwich 230 14   Perfect Cambodian Unwich 340 22   Ham and Provolone Unwich 350 19   Tuna Salad [place] 280 11   The Veggie [place]wich 410 17   Side items: dill pickle          Chipotle  Item Calories Protein (g)   *Salad with your choice of meat, beans, fresh tomato salsa, fajita veggies, and guacamole (NO rice) ~375 ~40        Applebees  Item Calories Protein (g)   Grilled Chicken Breast 290 38   Hector Shrimp Salad, no wonton strips, use ½ dressing <390 26   Blackened Shrimp Caesar Salad, no croutons, use ½ dressing  <660 25   *Grilled Chicken Caesar Salad, no croutons, use ½ dressing <770 47   Jenkins [place]  with Maple Mustard Glaze 350 37   Side Grilled Shrimp Skewer 110 27   Side items: steamed broccoli, garlicky green beans,               [employer]  Item Calories Protein (g)   *Spinach & Mushroom Omelette, no hollandaise sauce  <890 46   *Garden Omelette 840 46   Egg White Vegetable Omelette 380 29   *Grilled Chicken & Veggie Salad, use ½ dressing  <680 38     Olive Garden   Item Calories Protein (g)   *Herb-Grilled [place] 460 45   House salad with, no croutons. (Add grilled chicken or shrimp) <400 25   Side items: steamed broccoli       Walk Ons   Tuna Tini 410 30   Venison [place]- Bowl 330 14   Chicken Berry Pecan Salad      Side items: seasonal fruit, broccoli, green beans side salad       Sample Menu Plan: 6552-2767 Calories;  grams of Protein     DAY 1     Breakfast  1 cup 2% cottage cheese, 1/2 cup fruit      Snack  200 calorie low-carb protein drink (4 grams sugar or less)    Lunch  Tuna salad sandwich on whole wheat with lettuce and tomato, using light holbrook    Snack  1oz unsalted nuts and 17 grapes (or a piece of fruit)    Dinner  3-4oz grilled fish   ½ mashed sweet potato with no calorie spray butter  1/2 cup cooked spinach, and1 cup salad with spray dressing    Snack  60 calorie ice cream bar, fudgsicle or frozen fruit bar      DAY 2    Breakfast  2 eggs with 1oz low-fat cheese, 1 slice whole wheat toast with spray margarine if desired    Snack  200 calorie low-carb protein drink (4 grams sugar or less)    Lunch  Turkey and low-fat cheese sandwich on whole wheat toast with lettuce and tomato    Snack  1 cup low-fat cottage cheese, ½ cup fruit    Dinner  Baked chicken thigh  ½ cup whole wheat pasta with olive oil and parmesan cheese  ½ cup cooked green beans, and1 cup salad with spray dressing    Snack  Greek yogurt cup    DAY 3    Breakfast   200 calorie low-carb protein drink (4 grams sugar or less)    Snack  Atkins protein bar    Lunch  Grilled Chicken sandwich on whole wheat, with lettuce, tomato and ¼  small avocado    Snack  2 sugar-free popsicles  1oz unsalted nuts    Dinner  1 cup red, white or black beans, ½ cup brown rice  ½ cup green beans or other cooked vegetable    Snack  Greek yogurt cup      DAY 4    Breakfast  1 tablespoon peanut butter and ½ banana on 1 slice whole wheat toast  Light yogurt cup (less than 100 calories)    Snack  Mozzarella string cheese  Fruit cup (no sugar added)    Lunch  1 cup whole wheat pasta, with 3oz chicken breast , and ½ cup steamed broccoli. Toss with 1 tbsp olive oil and ½ cup shredded parmesan cheese  1 cup salad with spray dressing    Dinner  1 cup broth based vegetable and noodle soup  200 calorie low-carb protein drink (4 grams sugar or less)    Snack  60 calorie ice cream bar, fudgsicle or frozen fruit bar

## 2020-10-14 NOTE — PROGRESS NOTES
"NUTRITIONAL CONSULT    Referring Physician: Kamlesh Atkins M.D.  Reason for MNT Referral: Initial assessment for sleeve gastrectomy work-up    PAST MEDICAL HISTORY:   50 y.o. male  Body mass index is 98.07 kg/m²..  Weight history includes Lowest recent wt was 514 lbs on 2015; highest wt current wt  Dieting attempts include Pt was in work up prior in  and has been seeing Dr. Anaya with rx for low carb meal plan 1500 caories    Past Medical History:   Diagnosis Date    Bilateral primary osteoarthritis of knee     Hypertension     Obesity     Sleep apnea        CLINICAL DATA:  50 y.o.-year-old Black or  male.  Height: 5'6"  Weight: 607.5 lbs  IBW: 151 lbs  BMI: 98.12  The patient's goal weight (50% EBW):  lbs  Fluid pills eating healthier baked chicken for whole week  Goal for Bariatric Surgery: to improve health, to improve quality of life, to lose weight and to prevent future medical conditions    DAILY NUTRITIONAL NEEDS:pre-op bariatric nutritional guidelines to promote weight loss  [id number]  Calories   Grams Protein    NUTRITION & HEALTH HISTORY:  Greatest challenge: irregular meal patterns    Current diet recall:  Breakfast: skips, or ho and eggs and sometimes grits  Lunch/Dinner:wrap spinach     Current Diet:  Meal pattern: 2-3 meals  Protein supplements: Premier used previously  Snackin / day  Vegetables: Likes a few. Eats once per week.  Fruits: Likes a few. Eats once per week.  Beverages: water coffee with 2 tsp sugar, coke and sprite 2 cans per day, water, body armor lyte, crystal lite  Dining out: Weekly. Mostly fast food and take-out.  Cooking at home: Weekly. Mostly baked, grilled, smothered and fried meat and low carb wrap.    Exercise:  Past exercise: Fair    Current exercise: Fair Downloaded hyun to track steps  Restrictions to exercise: SOB    Vitamins / Minerals / Herbs:   One a Day Men Gummies      Labs:    no recent    Food Allergies:   None " reported    Social:  Disabled.  Lives with self.  Grocery shopping and food prep pt  Patient believes the household will be supportive after surgery.  Alcohol: None.  Smoking: None.    ASSESSMENT:  · Patient reports attempts at weight loss, only to regain lost weight.  · Patient demonstrated knowledge of healthy eating behaviors and exercise patterns; admits to not eating healthy and not exercising at this point.  · Patient states willingness to change lifestyle and make behavior modifications as evidenced by eating less carbs lately and decreasing soda intake.        Barriers to Education: none    Stage of change: determination    NUTRITION DIAGNOSIS:     Morbid Obesity related to Food and nutrition related knowledge deficit, Excessive calorie intake, Inadequate protein intake and Physical inactivity as evidence by BMI.    BARIATRIC DIET DISCUSSION/PLAN:  Discussed diet after surgery and related to patient's food record.  Reviewed nutrition guidelines for before and after surgery.  Answered all questions.  Work on Bariatric Nutrition Checklist.  Work on expanding variety of vegetables.  Work on gradually cutting back on starchy CHO in the diet.  1500-calorie diet.  5-6 meals per day.  Start including protein supplements in the diet plan daily.  Increase exercise.  Return to clinic.    RECOMMENDATIONS:  Patient is a potential candidate for bariatric surgery.    Needs additional visit(s) with RD.    Patient verbalized understanding.    Expect fair  compliance after surgery at this time.    Communicated nutrition plan with bariatric team.    SESSION TIME:  60 minutes

## 2020-10-14 NOTE — PATIENT INSTRUCTIONS
Ozempic once a week. Start [id number] once a week    Decrease portions as soon as you start Ozempic. Some nausea in the first 2 weeks is not unusual.     If you get pain across the upper abdomen and around to your back, please call the office.     Patient was informed that topiramate is used for migraine prevention and seizures. Weight loss is a common side effect that is well documented. S/he understands this. S/he was informed of the potential side effects such as serious and possibly fatal rash in which case the medication should be discontinued immediately. Paresthesias, forgetfulness, fatigue, kidney stones, GI symptoms, and changes in lab values such as electrolytes, blood counts and kidney function.      Start topiramate  in the evening for 1 week, then morning and evening.         Increase low impact activity as tolerated.  Avoid high impact activity, very heavy lifting or other exercise regimens that may cause discomfort.  3 meals a day made up of the following:  Unlimited green vegetables, tomatoes, mushrooms, spaghetti squash, cauliflower, meat, poultry, seafood, eggs and hard cheeses.   Milk and plain yogurt  Dressings, seasonings, condiments, etc should have less than 2 g sugars.   Beans (1-1.5 cups) or nuts (1/4 cup) can have 1 x a day.   1-2 servings of citrus fruits, berries, pineapple or melon a day (1/2 cup)  Avoid fried foods    Limit/avoid sweets, grains, rice, pasta, potatoes, bread, corn, peas, oatmeal, grits, tortillas, crackers, chips    No soda, sweet tea, juices or lemonade. All drinks should be 5 calories or less.     Www.dietdoctor.com for recipes. Moderate carb intake        SEATED RESISTANCE BAND EXERCISES     If you do not have a resistance band, or do not feel comfortable using a resistance band, these exercises can also be done holding a light hand weight or water bottle.  If you are just starting to exercise, you may want to go through the motions without any weights or resistance till  you become comfortable with the movements.     Do each of the movements shown 10 times (10 repetitions). You can repeat the exercises a second or  third time as well for greater benefit. The amount of tension on the resistance bands should be adjusted so  you can complete one set of 10 repetitions with effort. Increase the tension every few weeks. Do these  exercises 2 or 3 times a week.     * If an exercise hurts your back or joints, stop doing that particular exercise, but keep doing all the others *        CHEST EXERCISE          Start Position:   Sit tall, with feet shoulder width apart and feet in front of knees.   Belly pulled in.   Place the band around your upper back, grasp band in each hand, knuckles (rings) facing front. Arms  should be bent so that knuckles are in front of elbows   Slide shoulder blades down your back and slightly together (as if making a V).   Relax your neck.     To Perform This Exercise:   Press hands forward to lengthen arms using chest muscles (not arms!).   Dont arch your back!)   Return to start position and repeat 10 times.   Breathe!           BACK EXERCISE          Start Position:   Sit tall, with feet shoulder width apart and feet in front of knees.   Belly pulled in.   Grasp band in each hand and raise overhead. Arms should be slightly wider than shoulder width and no  slack in the band.   Slide shoulder blades down your back and slightly together (as if making a V).   Relax your neck.     To Perform This Exercise:   Open arms pulling down towards chest using upper back muscles (not arms!).   Squeeze through shoulder blades at the bottom of the movement (dont arch your back!).   Return to start position and repeat 10 times.   Breathe!           SHOULDER EXERCISE          Start Position:   Sit tall, with feet shoulder width apart and feet in front of knees.   Belly pulled in.   Sit on band so that you can grasp band in one hand with tension on the  band, but not so much tension that  you cannot straighten the arm. It may take a few tries to find the right amount of tension.   Slide shoulder blades down your back and slightly together (as if making a V).   Relax your neck.     To Perform This Exercise:   Press fist to the ceiling, slightly in front of the body.   SLOWLY return to start position and repeat 10 times.   Switch sides and repeat on the other side.   Breathe!           TRICEPS EXERCISE          Start Position:   Sit tall, with feet shoulder width apart and feet in front of knees.   Belly pulled in.   Sit on one end of the band. Grasp other end of band in one hand.   Point elbow directly toward the ceiling (if this is difficult, you may support the upper arm with the opposite  hand)   Be sure there is no slack in the band in the starting position.   Slide shoulder blades down your back and slightly together (as if making a V).   Relax your neck.     To Perform This Exercise:   Extend your arm up to the ceiling, as shown.   Squeeze through shoulder blades at the bottom of the movement (dont arch your back!).   SLOWLY return to start position and repeat 10 times.   Repeat on the other side.   Breathe!           BICEP EXERCISE          Start Position:   Sit tall, with feet shoulder width apart and feet in front of knees.   Belly pulled in.   Place center of exercise band under one foot and step the end of the band under the other foot.   Grasp each end of the band with one hand. Make sure there is no slack between your foot and the hand  that holds the band.   Hold your elbows to your sides.   Pull abdominals in, lift chest, press shoulders down and back.     To Perform This Exercise:   As you curl up, keep your wrist from changing position in relation to your forearm and your arm stable  from the shoulder to the elbow.   Bend and straighten your elbow in a slow and controlled movement. Repeat this motion 10 times.    Repeat on the other side.   Breathe!

## 2020-10-16 ENCOUNTER — TELEPHONE (OUTPATIENT)
Dept: INTERNAL MEDICINE | Facility: CLINIC | Age: 50
End: 2020-10-16

## 2020-10-16 NOTE — TELEPHONE ENCOUNTER
----- Message from Monica Saeed sent at 10/16/2020  9:50 AM CDT -----  Contact: [facility] [phone]  Requesting a Referral    Requesting to see: wound care    Reason for request: recurring wound to legs    Specific physician requested: No    Please call with a verbal.    Thank You

## 2020-10-20 ENCOUNTER — EXTERNAL HOME HEALTH (OUTPATIENT)
Dept: HOME HEALTH SERVICES | Facility: HOSPITAL | Age: 50
End: 2020-10-20
Payer: MEDICARE

## 2020-10-21 ENCOUNTER — PATIENT MESSAGE (OUTPATIENT)
Dept: BARIATRICS | Facility: CLINIC | Age: 50
End: 2020-10-21

## 2020-10-27 ENCOUNTER — PATIENT OUTREACH (OUTPATIENT)
Dept: ADMINISTRATIVE | Facility: OTHER | Age: 50
End: 2020-10-27

## 2020-10-29 ENCOUNTER — DOCUMENTATION ONLY (OUTPATIENT)
Dept: BARIATRICS | Facility: CLINIC | Age: 50
End: 2020-10-29

## 2020-12-03 ENCOUNTER — PATIENT OUTREACH (OUTPATIENT)
Dept: ADMINISTRATIVE | Facility: HOSPITAL | Age: 50
End: 2020-12-03

## 2020-12-16 RX ORDER — LISINOPRIL 2.5 MG/1
TABLET ORAL
Qty: 90 TABLET | Refills: 3 | Status: SHIPPED | OUTPATIENT
Start: 2020-12-16 | End: 2022-02-28

## 2020-12-16 RX ORDER — POTASSIUM CHLORIDE 750 MG/1
CAPSULE, EXTENDED RELEASE ORAL
Qty: 90 CAPSULE | Refills: 3 | Status: SHIPPED | OUTPATIENT
Start: 2020-12-16 | End: 2022-08-10

## 2020-12-16 NOTE — TELEPHONE ENCOUNTER
No new care gaps identified.  Powered by [employer]. Reference number: [id number]. 12/16/2020 12:16:46 AM   CST

## 2020-12-16 NOTE — PROGRESS NOTES
Refill Authorization Note   Ke Oliver  is requesting a refill authorization.  Brief Assessment and Rationale for Refill:  Approve     Medication Therapy Plan:       Medication Reconciliation Completed: No   Comments:       Requested Prescriptions   Pending Prescriptions Disp Refills    potassium chloride (MICRO-K) 10 MEQ CpSR [Pharmacy Med Name: POTASSIUM CL ER 10 MEQ CAPSULE] 90 capsule 3     Sig: TAKE 1 CAPSULE BY MOUTH ONCE DAILY. WHEN TAKING FUROSEMIDE(LASIX) FOR 60 DOSES       Endocrinology:  Minerals - Potassium Supplementation Passed - 12/16/2020 12:16 AM        Passed - Patient is at least 18 years old        Passed - Office visit in past 12 months or future 90 days     Recent Outpatient Visits            2 months ago Morbid obesity with body mass index of 70 and over in adult    [facility] [address] Aby Anaya MD    2 months ago Annual physical exam    [facility] Corey Craig MD    5 months ago Morbid obesity with body mass index of 70 and over in adult    [facility] [address] Aby Anaya MD    8 months ago Essential hypertension    [facility] Corey Craig MD    9 months ago Class 3 severe obesity due to excess calories with serious comorbidity and body mass index (BMI) greater than or equal to 70 in adult    [facility] [address] Aby Anaya MD          Future Appointments              In 4 weeks Aby Anaya MD [facility] [address], [facility]    In 2 months Corey Craig MD [facility]                Passed - K in normal range and within 360 days     Potassium   Date Value Ref Range Status   09/30/2020 3.9 3.5 - 5.1 mmol/L Final   10/08/2019 4.1 3.5 - 5.1 mmol/L Final   09/29/2019 3.7 3.5 - 5.1 mmol/L Final              Passed - Cr is 1.4 or below and within 360 days     Creatinine   Date Value Ref Range Status   09/30/2020 0.8 0.5 -  1.4 mg/dL Final   10/08/2019 1.0 0.5 - 1.4 mg/dL Final   09/29/2019 1.0 0.5 - 1.4 mg/dL Final              Passed - eGFR within 360 days     eGFR if non    Date Value Ref Range Status   09/30/2020 >60.0 >60 mL/min/1.73 m^2 Final     Comment:     Calculation used to obtain the estimated glomerular filtration  rate (eGFR) is the CKD-EPI equation.      10/08/2019 >60 >60 mL/min/1.73 m^2 Final     Comment:     Calculation used to obtain the estimated glomerular filtration  rate (eGFR) is the CKD-EPI equation.      09/29/2019 >60.0 >60 mL/min/1.73 m^2 Final     Comment:     Calculation used to obtain the estimated glomerular filtration  rate (eGFR) is the CKD-EPI equation.        eGFR if    Date Value Ref Range Status   09/30/2020 >60.0 >60 mL/min/1.73 m^2 Final   10/08/2019 >60 >60 mL/min/1.73 m^2 Final   09/29/2019 >60.0 >60 mL/min/1.73 m^2 Final                lisinopriL (PRINIVIL,ZESTRIL) 2.5 MG tablet [Pharmacy Med Name: LISINOPRIL 2.5 MG TABLET] 90 tablet 3     Sig: TAKE 1 TABLET BY MOUTH EVERY DAY       Cardiovascular:  ACE Inhibitors Passed - 12/16/2020 12:16 AM        Passed - Patient is at least 18 years old        Passed - Last BP in normal range within 360 days.     BP Readings from Last 3 Encounters:   10/14/20 130/78   09/30/20 (!) 155/76   03/11/20 122/84              Passed - Office visit in past 12 months or future 90 days     Recent Outpatient Visits            2 months ago Morbid obesity with body mass index of 70 and over in adult    [facility] Aby Anaya MD    2 months ago Annual physical exam    [facility] Corey Craig MD    5 months ago Morbid obesity with body mass index of 70 and over in adult    [facility] bAy Anaya MD    8 months ago Essential hypertension    [facility] Corey Craig MD    9 months ago Class 3 severe obesity due to excess calories  with serious comorbidity and body mass index (BMI) greater than or equal to 70 in adult    [facility] Aby Anaya MD          Future Appointments              In 4 weeks MD [facility]    In 2 months MD [facility]                Passed - Cr is 1.4 or below and within 360 days     Creatinine   Date Value Ref Range Status   09/30/2020 0.8 0.5 - 1.4 mg/dL Final   10/08/2019 1.0 0.5 - 1.4 mg/dL Final   09/29/2019 1.0 0.5 - 1.4 mg/dL Final              Passed - K in normal range and within 360 days     Potassium   Date Value Ref Range Status   09/30/2020 3.9 3.5 - 5.1 mmol/L Final   10/08/2019 4.1 3.5 - 5.1 mmol/L Final   09/29/2019 3.7 3.5 - 5.1 mmol/L Final              Passed - eGFR within 360 days     eGFR if non    Date Value Ref Range Status   09/30/2020 >60.0 >60 mL/min/1.73 m^2 Final     Comment:     Calculation used to obtain the estimated glomerular filtration  rate (eGFR) is the CKD-EPI equation.      10/08/2019 >60 >60 mL/min/1.73 m^2 Final     Comment:     Calculation used to obtain the estimated glomerular filtration  rate (eGFR) is the CKD-EPI equation.      09/29/2019 >60.0 >60 mL/min/1.73 m^2 Final     Comment:     Calculation used to obtain the estimated glomerular filtration  rate (eGFR) is the CKD-EPI equation.        eGFR if    Date Value Ref Range Status   09/30/2020 >60.0 >60 mL/min/1.73 m^2 Final   10/08/2019 >60 >60 mL/min/1.73 m^2 Final   09/29/2019 >60.0 >60 mL/min/1.73 m^2 Final                  Appointments  past 12m or future 3m with PCP    Date Provider   Last Visit   9/30/2020 Corey Craig MD   Next Visit   3/8/2021 Corey Craig MD   ED visits in past 90 days: 0     Note composed:2:53 PM 12/16/2020

## 2020-12-22 RX ORDER — DICLOFENAC SODIUM 75 MG/1
TABLET, DELAYED RELEASE ORAL
Qty: 180 TABLET | Refills: 3 | Status: SHIPPED | OUTPATIENT
Start: 2020-12-22 | End: 2021-03-08

## 2020-12-22 NOTE — PROGRESS NOTES
Refill Routing Note   Medication(s) are not appropriate for processing by [facility] for the following reason(s):     - Outside of Protocol  [facility] action(s):   Route          Medication reconciliation completed: No   Automatic Epic Generated Protocol Data:        Requested Prescriptions   Pending Prescriptions Disp Refills    diclofenac (VOLTAREN) 75 MG EC tablet [Pharmacy Med Name: DICLOFENAC SOD DR 75 MG TAB] 180 tablet 3     Sig: TAKE 1 TABLET BY MOUTH TWICE A DAY       NSAIDs Protocol Passed - 12/22/2020 12:11 AM        Passed - Serum Creatinine less than 1.4 on file in the past 12 months     Lab Results   Component Value Date    CREATININE 0.8 09/30/2020    CREATININE 1.0 10/08/2019    CREATININE 1.0 09/29/2019     Lab Results   Component Value Date    ESTGFRAFRICA >60.0 09/30/2020    ESTGFRAFRICA >60 10/08/2019    ESTGFRAFRICA >60.0 09/29/2019               Passed - Visit with authorizing provider in past 12 months or upcoming 90 days        Passed - HGB greater than 10 or HCT greater than 30 in past 12 months        Passed - AST in past 12 months      Lab Results   Component Value Date    AST 13 09/30/2020    AST 8 (L) 09/27/2019    AST 10 06/13/2019              Passed - Serum Potassium less than 5.2 on file in the past 12 months     Lab Results   Component Value Date    K 3.9 09/30/2020    K 4.1 10/08/2019    K 3.7 09/29/2019                  Passed - Blood Pressure below 139/89 on file in past 12 months      BP Readings from Last 3 Encounters:   10/14/20 130/78   09/30/20 (!) 155/76   03/11/20 122/84             Passed - ALT less than 95 in past 12 months     Lab Results   Component Value Date    ALT 11 09/30/2020    ALT 15 09/27/2019    ALT 8 (L) 06/13/2019                    Appointments  past 12m or future 3m with PCP    Date Provider   Last Visit   9/30/2020 Corey Craig MD   Next Visit   3/8/2021 Corey Craig MD   ED visits in past 90 days: 0        Note composed:12:01 PM 12/22/2020

## 2021-01-11 ENCOUNTER — PATIENT OUTREACH (OUTPATIENT)
Dept: ADMINISTRATIVE | Facility: OTHER | Age: 51
End: 2021-01-11

## 2021-01-13 ENCOUNTER — OFFICE VISIT (OUTPATIENT)
Dept: BARIATRICS | Facility: CLINIC | Age: 51
End: 2021-01-13
Payer: MEDICARE

## 2021-01-13 VITALS
DIASTOLIC BLOOD PRESSURE: 68 MMHG | HEART RATE: 90 BPM | RESPIRATION RATE: 16 BRPM | SYSTOLIC BLOOD PRESSURE: 124 MMHG | BODY MASS INDEX: 50.62 KG/M2 | WEIGHT: 315 LBS | HEIGHT: 66 IN

## 2021-01-13 DIAGNOSIS — E66.01 MORBID OBESITY WITH BODY MASS INDEX OF 70 AND OVER IN ADULT: Primary | ICD-10-CM

## 2021-01-13 DIAGNOSIS — E66.01 CLASS 3 SEVERE OBESITY DUE TO EXCESS CALORIES WITH SERIOUS COMORBIDITY AND BODY MASS INDEX (BMI) GREATER THAN OR EQUAL TO 70 IN ADULT: ICD-10-CM

## 2021-01-13 DIAGNOSIS — R73.03 PREDIABETES: ICD-10-CM

## 2021-01-13 PROCEDURE — 99214 OFFICE O/P EST MOD 30 MIN: CPT | Mod: S$GLB,,, | Performed by: INTERNAL MEDICINE

## 2021-01-13 PROCEDURE — 3008F BODY MASS INDEX DOCD: CPT | Mod: CPTII,S$GLB,, | Performed by: INTERNAL MEDICINE

## 2021-01-13 PROCEDURE — 99214 PR OFFICE/OUTPT VISIT, EST, LEVL IV, 30-39 MIN: ICD-10-PCS | Mod: S$GLB,,, | Performed by: INTERNAL MEDICINE

## 2021-01-13 PROCEDURE — 1126F PR PAIN SEVERITY QUANTIFIED, NO PAIN PRESENT: ICD-10-PCS | Mod: S$GLB,,, | Performed by: INTERNAL MEDICINE

## 2021-01-13 PROCEDURE — 99999 PR PBB SHADOW E&M-EST. PATIENT-LVL III: ICD-10-PCS | Mod: PBBFAC,,, | Performed by: INTERNAL MEDICINE

## 2021-01-13 PROCEDURE — 99999 PR PBB SHADOW E&M-EST. PATIENT-LVL III: CPT | Mod: PBBFAC,,, | Performed by: INTERNAL MEDICINE

## 2021-01-13 PROCEDURE — 3074F SYST BP LT 130 MM HG: CPT | Mod: CPTII,S$GLB,, | Performed by: INTERNAL MEDICINE

## 2021-01-13 PROCEDURE — 3008F PR BODY MASS INDEX (BMI) DOCUMENTED: ICD-10-PCS | Mod: CPTII,S$GLB,, | Performed by: INTERNAL MEDICINE

## 2021-01-13 PROCEDURE — 3078F PR MOST RECENT DIASTOLIC BLOOD PRESSURE < 80 MM HG: ICD-10-PCS | Mod: CPTII,S$GLB,, | Performed by: INTERNAL MEDICINE

## 2021-01-13 PROCEDURE — 1126F AMNT PAIN NOTED NONE PRSNT: CPT | Mod: S$GLB,,, | Performed by: INTERNAL MEDICINE

## 2021-01-13 PROCEDURE — 3078F DIAST BP <80 MM HG: CPT | Mod: CPTII,S$GLB,, | Performed by: INTERNAL MEDICINE

## 2021-01-13 PROCEDURE — 3074F PR MOST RECENT SYSTOLIC BLOOD PRESSURE < 130 MM HG: ICD-10-PCS | Mod: CPTII,S$GLB,, | Performed by: INTERNAL MEDICINE

## 2021-01-13 RX ORDER — TOPIRAMATE 100 MG/1
100 TABLET, FILM COATED ORAL 2 TIMES DAILY
Qty: 180 TABLET | Refills: 1 | Status: SHIPPED | OUTPATIENT
Start: 2021-01-13 | End: 2021-04-13 | Stop reason: SDUPTHER

## 2021-01-13 RX ORDER — SEMAGLUTIDE 1.34 MG/ML
INJECTION, SOLUTION SUBCUTANEOUS
Qty: 9 ML | Refills: 1 | Status: SHIPPED | OUTPATIENT
Start: 2021-01-13 | End: 2021-04-13 | Stop reason: SDUPTHER

## 2021-01-13 RX ORDER — TOPIRAMATE 100 MG/1
100 TABLET, FILM COATED ORAL 2 TIMES DAILY
Qty: 180 TABLET | Refills: 1
Start: 2021-01-13 | End: 2021-01-13 | Stop reason: SDUPTHER

## 2021-01-13 RX ORDER — EMPAGLIFLOZIN 10 MG/1
10 TABLET, FILM COATED ORAL DAILY
Qty: 90 TABLET | Refills: 1 | Status: SHIPPED | OUTPATIENT
Start: 2021-01-13 | End: 2021-07-13 | Stop reason: SDUPTHER

## 2021-01-13 RX ORDER — TOPIRAMATE 100 MG/1
100 TABLET, FILM COATED ORAL 2 TIMES DAILY
Qty: 180 TABLET | Refills: 1
Start: 2021-01-13 | End: 2021-01-13

## 2021-01-20 DIAGNOSIS — E11.9 TYPE 2 DIABETES MELLITUS WITHOUT COMPLICATION: ICD-10-CM

## 2021-02-06 DIAGNOSIS — E55.9 VITAMIN D INSUFFICIENCY: ICD-10-CM

## 2021-02-09 RX ORDER — ERGOCALCIFEROL 1.25 MG/1
CAPSULE ORAL
Qty: 12 CAPSULE | Refills: 3 | Status: SHIPPED | OUTPATIENT
Start: 2021-02-09 | End: 2023-03-10

## 2021-02-10 DIAGNOSIS — E11.9 TYPE 2 DIABETES MELLITUS WITHOUT COMPLICATION, UNSPECIFIED WHETHER LONG TERM INSULIN USE: ICD-10-CM

## 2021-03-08 ENCOUNTER — OFFICE VISIT (OUTPATIENT)
Dept: INTERNAL MEDICINE | Facility: CLINIC | Age: 51
End: 2021-03-08
Payer: MEDICARE

## 2021-03-08 VITALS
BODY MASS INDEX: 50.62 KG/M2 | HEART RATE: 66 BPM | WEIGHT: 315 LBS | SYSTOLIC BLOOD PRESSURE: 134 MMHG | HEIGHT: 66 IN | DIASTOLIC BLOOD PRESSURE: 76 MMHG

## 2021-03-08 DIAGNOSIS — I10 ESSENTIAL HYPERTENSION: Primary | ICD-10-CM

## 2021-03-08 DIAGNOSIS — J41.0 CHRONIC BRONCHITIS, SIMPLE: ICD-10-CM

## 2021-03-08 DIAGNOSIS — L21.9 SEBORRHEIC DERMATITIS OF SCALP: ICD-10-CM

## 2021-03-08 DIAGNOSIS — E66.01 MORBID OBESITY WITH BODY MASS INDEX OF 70 AND OVER IN ADULT: Chronic | ICD-10-CM

## 2021-03-08 PROCEDURE — 99213 OFFICE O/P EST LOW 20 MIN: CPT | Mod: S$GLB,,, | Performed by: INTERNAL MEDICINE

## 2021-03-08 PROCEDURE — 1126F AMNT PAIN NOTED NONE PRSNT: CPT | Mod: S$GLB,,, | Performed by: INTERNAL MEDICINE

## 2021-03-08 PROCEDURE — 1126F PR PAIN SEVERITY QUANTIFIED, NO PAIN PRESENT: ICD-10-PCS | Mod: S$GLB,,, | Performed by: INTERNAL MEDICINE

## 2021-03-08 PROCEDURE — 3078F PR MOST RECENT DIASTOLIC BLOOD PRESSURE < 80 MM HG: ICD-10-PCS | Mod: CPTII,S$GLB,, | Performed by: INTERNAL MEDICINE

## 2021-03-08 PROCEDURE — 3075F PR MOST RECENT SYSTOLIC BLOOD PRESS GE 130-139MM HG: ICD-10-PCS | Mod: CPTII,S$GLB,, | Performed by: INTERNAL MEDICINE

## 2021-03-08 PROCEDURE — 99999 PR PBB SHADOW E&M-EST. PATIENT-LVL IV: ICD-10-PCS | Mod: PBBFAC,,, | Performed by: INTERNAL MEDICINE

## 2021-03-08 PROCEDURE — 99999 PR PBB SHADOW E&M-EST. PATIENT-LVL IV: CPT | Mod: PBBFAC,,, | Performed by: INTERNAL MEDICINE

## 2021-03-08 PROCEDURE — 3075F SYST BP GE 130 - 139MM HG: CPT | Mod: CPTII,S$GLB,, | Performed by: INTERNAL MEDICINE

## 2021-03-08 PROCEDURE — 3008F PR BODY MASS INDEX (BMI) DOCUMENTED: ICD-10-PCS | Mod: CPTII,S$GLB,, | Performed by: INTERNAL MEDICINE

## 2021-03-08 PROCEDURE — 3078F DIAST BP <80 MM HG: CPT | Mod: CPTII,S$GLB,, | Performed by: INTERNAL MEDICINE

## 2021-03-08 PROCEDURE — 99213 PR OFFICE/OUTPT VISIT, EST, LEVL III, 20-29 MIN: ICD-10-PCS | Mod: S$GLB,,, | Performed by: INTERNAL MEDICINE

## 2021-03-08 PROCEDURE — 3008F BODY MASS INDEX DOCD: CPT | Mod: CPTII,S$GLB,, | Performed by: INTERNAL MEDICINE

## 2021-03-08 RX ORDER — KETOCONAZOLE 20 MG/ML
SHAMPOO, SUSPENSION TOPICAL
Qty: 120 ML | Refills: 3 | Status: SHIPPED | OUTPATIENT
Start: 2021-03-08 | End: 2022-01-24 | Stop reason: SDUPTHER

## 2021-03-27 DIAGNOSIS — I50.31 ACUTE DIASTOLIC HEART FAILURE: ICD-10-CM

## 2021-03-30 RX ORDER — FUROSEMIDE 40 MG/1
TABLET ORAL
Qty: 180 TABLET | Refills: 1 | Status: SHIPPED | OUTPATIENT
Start: 2021-03-30 | End: 2021-10-14

## 2021-04-07 ENCOUNTER — TELEPHONE (OUTPATIENT)
Dept: INTERNAL MEDICINE | Facility: CLINIC | Age: 51
End: 2021-04-07

## 2021-04-08 ENCOUNTER — PATIENT MESSAGE (OUTPATIENT)
Dept: INTERNAL MEDICINE | Facility: CLINIC | Age: 51
End: 2021-04-08

## 2021-04-09 ENCOUNTER — PATIENT OUTREACH (OUTPATIENT)
Dept: ADMINISTRATIVE | Facility: OTHER | Age: 51
End: 2021-04-09

## 2021-04-12 DIAGNOSIS — J41.0 CHRONIC BRONCHITIS, SIMPLE: ICD-10-CM

## 2021-04-12 RX ORDER — ALBUTEROL SULFATE 0.63 MG/3ML
0.63 SOLUTION RESPIRATORY (INHALATION) EVERY 6 HOURS PRN
Qty: 1 BOX | Refills: 11 | Status: ON HOLD | OUTPATIENT
Start: 2021-04-12 | End: 2023-07-03

## 2021-04-13 ENCOUNTER — OFFICE VISIT (OUTPATIENT)
Dept: BARIATRICS | Facility: CLINIC | Age: 51
End: 2021-04-13
Payer: MEDICARE

## 2021-04-13 VITALS
HEIGHT: 66 IN | HEART RATE: 84 BPM | OXYGEN SATURATION: 94 % | WEIGHT: 315 LBS | SYSTOLIC BLOOD PRESSURE: 134 MMHG | DIASTOLIC BLOOD PRESSURE: 64 MMHG | BODY MASS INDEX: 50.62 KG/M2

## 2021-04-13 DIAGNOSIS — R73.03 PREDIABETES: Primary | ICD-10-CM

## 2021-04-13 DIAGNOSIS — E66.01 CLASS 3 SEVERE OBESITY DUE TO EXCESS CALORIES WITH SERIOUS COMORBIDITY AND BODY MASS INDEX (BMI) GREATER THAN OR EQUAL TO 70 IN ADULT: ICD-10-CM

## 2021-04-13 PROCEDURE — 99499 RISK ADDL DX/OHS AUDIT: ICD-10-PCS | Mod: S$GLB,,, | Performed by: INTERNAL MEDICINE

## 2021-04-13 PROCEDURE — 3008F PR BODY MASS INDEX (BMI) DOCUMENTED: ICD-10-PCS | Mod: CPTII,S$GLB,, | Performed by: INTERNAL MEDICINE

## 2021-04-13 PROCEDURE — 99213 OFFICE O/P EST LOW 20 MIN: CPT | Mod: S$GLB,,, | Performed by: INTERNAL MEDICINE

## 2021-04-13 PROCEDURE — 99999 PR PBB SHADOW E&M-EST. PATIENT-LVL IV: CPT | Mod: PBBFAC,,, | Performed by: INTERNAL MEDICINE

## 2021-04-13 PROCEDURE — 99213 PR OFFICE/OUTPT VISIT, EST, LEVL III, 20-29 MIN: ICD-10-PCS | Mod: S$GLB,,, | Performed by: INTERNAL MEDICINE

## 2021-04-13 PROCEDURE — 1126F AMNT PAIN NOTED NONE PRSNT: CPT | Mod: S$GLB,,, | Performed by: INTERNAL MEDICINE

## 2021-04-13 PROCEDURE — 99999 PR PBB SHADOW E&M-EST. PATIENT-LVL IV: ICD-10-PCS | Mod: PBBFAC,,, | Performed by: INTERNAL MEDICINE

## 2021-04-13 PROCEDURE — 3008F BODY MASS INDEX DOCD: CPT | Mod: CPTII,S$GLB,, | Performed by: INTERNAL MEDICINE

## 2021-04-13 PROCEDURE — 1126F PR PAIN SEVERITY QUANTIFIED, NO PAIN PRESENT: ICD-10-PCS | Mod: S$GLB,,, | Performed by: INTERNAL MEDICINE

## 2021-04-13 PROCEDURE — 99499 UNLISTED E&M SERVICE: CPT | Mod: S$GLB,,, | Performed by: INTERNAL MEDICINE

## 2021-04-13 RX ORDER — SEMAGLUTIDE 1.34 MG/ML
INJECTION, SOLUTION SUBCUTANEOUS
Qty: 9 ML | Refills: 1 | Status: SHIPPED | OUTPATIENT
Start: 2021-04-13 | End: 2021-07-13

## 2021-04-13 RX ORDER — TOPIRAMATE 100 MG/1
100 TABLET, FILM COATED ORAL 2 TIMES DAILY
Qty: 180 TABLET | Refills: 1 | Status: SHIPPED | OUTPATIENT
Start: 2021-04-13 | End: 2021-07-13 | Stop reason: SDUPTHER

## 2021-04-14 ENCOUNTER — CLINICAL SUPPORT (OUTPATIENT)
Dept: BARIATRICS | Facility: CLINIC | Age: 51
End: 2021-04-14
Payer: MEDICARE

## 2021-04-14 DIAGNOSIS — E66.01 MORBID OBESITY WITH BODY MASS INDEX OF 70 AND OVER IN ADULT: Chronic | ICD-10-CM

## 2021-04-14 DIAGNOSIS — Z71.3 DIETARY COUNSELING: ICD-10-CM

## 2021-04-14 DIAGNOSIS — G47.33 OBSTRUCTIVE SLEEP APNEA ON CPAP: ICD-10-CM

## 2021-04-14 DIAGNOSIS — I10 ESSENTIAL HYPERTENSION: ICD-10-CM

## 2021-04-14 PROCEDURE — 97803 MED NUTRITION INDIV SUBSEQ: CPT | Mod: 95,,, | Performed by: DIETITIAN, REGISTERED

## 2021-04-14 PROCEDURE — 97803 PR MED NUTR THER, SUBSQ, INDIV, EA 15 MIN: ICD-10-PCS | Mod: 95,,, | Performed by: DIETITIAN, REGISTERED

## 2021-04-15 ENCOUNTER — PATIENT MESSAGE (OUTPATIENT)
Dept: INTERNAL MEDICINE | Facility: CLINIC | Age: 51
End: 2021-04-15

## 2021-04-15 ENCOUNTER — TELEPHONE (OUTPATIENT)
Dept: INTERNAL MEDICINE | Facility: CLINIC | Age: 51
End: 2021-04-15

## 2021-04-15 DIAGNOSIS — R73.03 PREDIABETES: ICD-10-CM

## 2021-04-15 DIAGNOSIS — I10 ESSENTIAL HYPERTENSION: Primary | ICD-10-CM

## 2021-04-15 DIAGNOSIS — D50.8 OTHER IRON DEFICIENCY ANEMIA: ICD-10-CM

## 2021-05-12 ENCOUNTER — PATIENT OUTREACH (OUTPATIENT)
Dept: ADMINISTRATIVE | Facility: OTHER | Age: 51
End: 2021-05-12

## 2021-05-14 ENCOUNTER — CLINICAL SUPPORT (OUTPATIENT)
Dept: BARIATRICS | Facility: CLINIC | Age: 51
End: 2021-05-14
Payer: MEDICARE

## 2021-05-14 DIAGNOSIS — E66.01 MORBID OBESITY WITH BODY MASS INDEX OF 70 AND OVER IN ADULT: Chronic | ICD-10-CM

## 2021-05-14 DIAGNOSIS — Z71.3 DIETARY COUNSELING: ICD-10-CM

## 2021-05-14 DIAGNOSIS — G47.33 OBSTRUCTIVE SLEEP APNEA ON CPAP: ICD-10-CM

## 2021-05-14 DIAGNOSIS — I10 ESSENTIAL HYPERTENSION: ICD-10-CM

## 2021-05-14 PROCEDURE — 97803 MED NUTRITION INDIV SUBSEQ: CPT | Mod: 95,,, | Performed by: DIETITIAN, REGISTERED

## 2021-05-14 PROCEDURE — 97803 PR MED NUTR THER, SUBSQ, INDIV, EA 15 MIN: ICD-10-PCS | Mod: 95,,, | Performed by: DIETITIAN, REGISTERED

## 2021-06-11 ENCOUNTER — TELEPHONE (OUTPATIENT)
Dept: BARIATRICS | Facility: CLINIC | Age: 51
End: 2021-06-11

## 2021-07-13 ENCOUNTER — CLINICAL SUPPORT (OUTPATIENT)
Dept: BARIATRICS | Facility: CLINIC | Age: 51
End: 2021-07-13
Payer: MEDICARE

## 2021-07-13 ENCOUNTER — OFFICE VISIT (OUTPATIENT)
Dept: BARIATRICS | Facility: CLINIC | Age: 51
End: 2021-07-13
Payer: MEDICARE

## 2021-07-13 VITALS
BODY MASS INDEX: 93.24 KG/M2 | SYSTOLIC BLOOD PRESSURE: 138 MMHG | OXYGEN SATURATION: 95 % | WEIGHT: 315 LBS | DIASTOLIC BLOOD PRESSURE: 82 MMHG | HEART RATE: 81 BPM

## 2021-07-13 VITALS — BODY MASS INDEX: 93.76 KG/M2 | WEIGHT: 315 LBS

## 2021-07-13 DIAGNOSIS — Z71.3 DIETARY COUNSELING: ICD-10-CM

## 2021-07-13 DIAGNOSIS — E66.01 MORBID OBESITY WITH BODY MASS INDEX OF 70 AND OVER IN ADULT: Chronic | ICD-10-CM

## 2021-07-13 DIAGNOSIS — E66.01 CLASS 3 SEVERE OBESITY DUE TO EXCESS CALORIES WITH SERIOUS COMORBIDITY AND BODY MASS INDEX (BMI) GREATER THAN OR EQUAL TO 70 IN ADULT: Primary | ICD-10-CM

## 2021-07-13 DIAGNOSIS — G47.33 OBSTRUCTIVE SLEEP APNEA ON CPAP: ICD-10-CM

## 2021-07-13 DIAGNOSIS — R73.03 PREDIABETES: ICD-10-CM

## 2021-07-13 DIAGNOSIS — I10 ESSENTIAL HYPERTENSION: ICD-10-CM

## 2021-07-13 PROCEDURE — 99499 UNLISTED E&M SERVICE: CPT | Mod: S$GLB,,, | Performed by: INTERNAL MEDICINE

## 2021-07-13 PROCEDURE — 3008F PR BODY MASS INDEX (BMI) DOCUMENTED: ICD-10-PCS | Mod: CPTII,S$GLB,, | Performed by: INTERNAL MEDICINE

## 2021-07-13 PROCEDURE — 99213 PR OFFICE/OUTPT VISIT, EST, LEVL III, 20-29 MIN: ICD-10-PCS | Mod: S$GLB,,, | Performed by: INTERNAL MEDICINE

## 2021-07-13 PROCEDURE — 99999 PR PBB SHADOW E&M-EST. PATIENT-LVL II: ICD-10-PCS | Mod: PBBFAC,,, | Performed by: DIETITIAN, REGISTERED

## 2021-07-13 PROCEDURE — 99999 PR PBB SHADOW E&M-EST. PATIENT-LVL II: CPT | Mod: PBBFAC,,, | Performed by: DIETITIAN, REGISTERED

## 2021-07-13 PROCEDURE — 99213 OFFICE O/P EST LOW 20 MIN: CPT | Mod: S$GLB,,, | Performed by: INTERNAL MEDICINE

## 2021-07-13 PROCEDURE — 99499 RISK ADDL DX/OHS AUDIT: ICD-10-PCS | Mod: S$GLB,,, | Performed by: INTERNAL MEDICINE

## 2021-07-13 PROCEDURE — 1126F AMNT PAIN NOTED NONE PRSNT: CPT | Mod: S$GLB,,, | Performed by: INTERNAL MEDICINE

## 2021-07-13 PROCEDURE — 1126F PR PAIN SEVERITY QUANTIFIED, NO PAIN PRESENT: ICD-10-PCS | Mod: S$GLB,,, | Performed by: INTERNAL MEDICINE

## 2021-07-13 PROCEDURE — 99999 PR PBB SHADOW E&M-EST. PATIENT-LVL III: ICD-10-PCS | Mod: PBBFAC,,, | Performed by: INTERNAL MEDICINE

## 2021-07-13 PROCEDURE — 3008F BODY MASS INDEX DOCD: CPT | Mod: CPTII,S$GLB,, | Performed by: INTERNAL MEDICINE

## 2021-07-13 PROCEDURE — 99999 PR PBB SHADOW E&M-EST. PATIENT-LVL III: CPT | Mod: PBBFAC,,, | Performed by: INTERNAL MEDICINE

## 2021-07-13 PROCEDURE — 97803 MED NUTRITION INDIV SUBSEQ: CPT | Mod: S$GLB,,, | Performed by: DIETITIAN, REGISTERED

## 2021-07-13 PROCEDURE — 97803 PR MED NUTR THER, SUBSQ, INDIV, EA 15 MIN: ICD-10-PCS | Mod: S$GLB,,, | Performed by: DIETITIAN, REGISTERED

## 2021-07-13 RX ORDER — SEMAGLUTIDE 1.34 MG/ML
1 INJECTION, SOLUTION SUBCUTANEOUS
Qty: 3 PEN | Refills: 1 | Status: SHIPPED | OUTPATIENT
Start: 2021-07-13 | End: 2021-10-13 | Stop reason: SDUPTHER

## 2021-07-13 RX ORDER — TOPIRAMATE 100 MG/1
100 TABLET, FILM COATED ORAL 2 TIMES DAILY
Qty: 180 TABLET | Refills: 1 | Status: SHIPPED | OUTPATIENT
Start: 2021-07-13 | End: 2021-10-13 | Stop reason: SDUPTHER

## 2021-07-13 RX ORDER — EMPAGLIFLOZIN 10 MG/1
10 TABLET, FILM COATED ORAL DAILY
Qty: 90 TABLET | Refills: 1 | Status: SHIPPED | OUTPATIENT
Start: 2021-07-13 | End: 2022-04-22

## 2021-08-10 DIAGNOSIS — K21.9 GASTROESOPHAGEAL REFLUX DISEASE: ICD-10-CM

## 2021-08-10 RX ORDER — OMEPRAZOLE 40 MG/1
CAPSULE, DELAYED RELEASE ORAL
Qty: 90 CAPSULE | Refills: 2 | Status: SHIPPED | OUTPATIENT
Start: 2021-08-10 | End: 2022-05-09

## 2021-10-12 ENCOUNTER — LAB VISIT (OUTPATIENT)
Dept: LAB | Facility: HOSPITAL | Age: 51
End: 2021-10-12
Payer: MEDICARE

## 2021-10-12 ENCOUNTER — OFFICE VISIT (OUTPATIENT)
Dept: INTERNAL MEDICINE | Facility: CLINIC | Age: 51
End: 2021-10-12
Payer: MEDICARE

## 2021-10-12 VITALS
OXYGEN SATURATION: 96 % | HEART RATE: 89 BPM | BODY MASS INDEX: 53.78 KG/M2 | DIASTOLIC BLOOD PRESSURE: 83 MMHG | WEIGHT: 315 LBS | SYSTOLIC BLOOD PRESSURE: 127 MMHG | HEIGHT: 64 IN

## 2021-10-12 DIAGNOSIS — Z00.00 ANNUAL PHYSICAL EXAM: Primary | ICD-10-CM

## 2021-10-12 DIAGNOSIS — I10 ESSENTIAL HYPERTENSION: ICD-10-CM

## 2021-10-12 DIAGNOSIS — R60.0 BILATERAL LOWER EXTREMITY EDEMA: ICD-10-CM

## 2021-10-12 DIAGNOSIS — Z00.00 LABORATORY EXAMINATION ORDERED AS PART OF A ROUTINE GENERAL MEDICAL EXAMINATION: ICD-10-CM

## 2021-10-12 DIAGNOSIS — R73.03 PREDIABETES: ICD-10-CM

## 2021-10-12 DIAGNOSIS — E66.01 MORBID OBESITY WITH BODY MASS INDEX OF 70 AND OVER IN ADULT: ICD-10-CM

## 2021-10-12 DIAGNOSIS — I50.31 ACUTE DIASTOLIC HEART FAILURE: ICD-10-CM

## 2021-10-12 LAB
ALBUMIN SERPL BCP-MCNC: 3.7 G/DL (ref 3.5–5.2)
ALP SERPL-CCNC: 70 U/L (ref 55–135)
ALT SERPL W/O P-5'-P-CCNC: 7 U/L (ref 10–44)
ANION GAP SERPL CALC-SCNC: 12 MMOL/L (ref 8–16)
AST SERPL-CCNC: 12 U/L (ref 10–40)
BASOPHILS # BLD AUTO: 0.04 K/UL (ref 0–0.2)
BASOPHILS NFR BLD: 0.5 % (ref 0–1.9)
BILIRUB SERPL-MCNC: 0.3 MG/DL (ref 0.1–1)
BUN SERPL-MCNC: 11 MG/DL (ref 6–20)
CALCIUM SERPL-MCNC: 9.7 MG/DL (ref 8.7–10.5)
CHLORIDE SERPL-SCNC: 103 MMOL/L (ref 95–110)
CHOLEST SERPL-MCNC: 191 MG/DL (ref 120–199)
CHOLEST/HDLC SERPL: 4 {RATIO} (ref 2–5)
CO2 SERPL-SCNC: 24 MMOL/L (ref 23–29)
CREAT SERPL-MCNC: 0.8 MG/DL (ref 0.5–1.4)
DIFFERENTIAL METHOD: ABNORMAL
EOSINOPHIL # BLD AUTO: 0.1 K/UL (ref 0–0.5)
EOSINOPHIL NFR BLD: 1.4 % (ref 0–8)
ERYTHROCYTE [DISTWIDTH] IN BLOOD BY AUTOMATED COUNT: 14.3 % (ref 11.5–14.5)
EST. GFR  (AFRICAN AMERICAN): >60 ML/MIN/1.73 M^2
EST. GFR  (NON AFRICAN AMERICAN): >60 ML/MIN/1.73 M^2
ESTIMATED AVG GLUCOSE: 114 MG/DL (ref 68–131)
GLUCOSE SERPL-MCNC: 82 MG/DL (ref 70–110)
HBA1C MFR BLD: 5.6 % (ref 4–5.6)
HCT VFR BLD AUTO: 44.1 % (ref 40–54)
HDLC SERPL-MCNC: 48 MG/DL (ref 40–75)
HDLC SERPL: 25.1 % (ref 20–50)
HGB BLD-MCNC: 13.7 G/DL (ref 14–18)
IMM GRANULOCYTES # BLD AUTO: 0.02 K/UL (ref 0–0.04)
IMM GRANULOCYTES NFR BLD AUTO: 0.2 % (ref 0–0.5)
LDLC SERPL CALC-MCNC: 125.8 MG/DL (ref 63–159)
LYMPHOCYTES # BLD AUTO: 1.5 K/UL (ref 1–4.8)
LYMPHOCYTES NFR BLD: 18 % (ref 18–48)
MAGNESIUM SERPL-MCNC: 2.3 MG/DL (ref 1.6–2.6)
MCH RBC QN AUTO: 27.7 PG (ref 27–31)
MCHC RBC AUTO-ENTMCNC: 31.1 G/DL (ref 32–36)
MCV RBC AUTO: 89 FL (ref 82–98)
MONOCYTES # BLD AUTO: 0.7 K/UL (ref 0.3–1)
MONOCYTES NFR BLD: 8.4 % (ref 4–15)
NEUTROPHILS # BLD AUTO: 6.1 K/UL (ref 1.8–7.7)
NEUTROPHILS NFR BLD: 71.5 % (ref 38–73)
NONHDLC SERPL-MCNC: 143 MG/DL
NRBC BLD-RTO: 0 /100 WBC
PLATELET # BLD AUTO: 304 K/UL (ref 150–450)
PMV BLD AUTO: 10.6 FL (ref 9.2–12.9)
POTASSIUM SERPL-SCNC: 4.4 MMOL/L (ref 3.5–5.1)
PROT SERPL-MCNC: 8.1 G/DL (ref 6–8.4)
RBC # BLD AUTO: 4.95 M/UL (ref 4.6–6.2)
SODIUM SERPL-SCNC: 139 MMOL/L (ref 136–145)
TRIGL SERPL-MCNC: 86 MG/DL (ref 30–150)
WBC # BLD AUTO: 8.5 K/UL (ref 3.9–12.7)

## 2021-10-12 PROCEDURE — 99499 RISK ADDL DX/OHS AUDIT: ICD-10-PCS | Mod: S$GLB,,, | Performed by: INTERNAL MEDICINE

## 2021-10-12 PROCEDURE — 3008F PR BODY MASS INDEX (BMI) DOCUMENTED: ICD-10-PCS | Mod: CPTII,S$GLB,, | Performed by: INTERNAL MEDICINE

## 2021-10-12 PROCEDURE — 99999 PR PBB SHADOW E&M-EST. PATIENT-LVL III: ICD-10-PCS | Mod: PBBFAC,,, | Performed by: INTERNAL MEDICINE

## 2021-10-12 PROCEDURE — 83735 ASSAY OF MAGNESIUM: CPT | Performed by: INTERNAL MEDICINE

## 2021-10-12 PROCEDURE — 80061 LIPID PANEL: CPT | Performed by: INTERNAL MEDICINE

## 2021-10-12 PROCEDURE — 83036 HEMOGLOBIN GLYCOSYLATED A1C: CPT | Performed by: INTERNAL MEDICINE

## 2021-10-12 PROCEDURE — 1159F MED LIST DOCD IN RCRD: CPT | Mod: CPTII,S$GLB,, | Performed by: INTERNAL MEDICINE

## 2021-10-12 PROCEDURE — 36415 COLL VENOUS BLD VENIPUNCTURE: CPT | Performed by: INTERNAL MEDICINE

## 2021-10-12 PROCEDURE — 80053 COMPREHEN METABOLIC PANEL: CPT | Performed by: INTERNAL MEDICINE

## 2021-10-12 PROCEDURE — 99213 OFFICE O/P EST LOW 20 MIN: CPT | Mod: S$GLB,,, | Performed by: INTERNAL MEDICINE

## 2021-10-12 PROCEDURE — 85025 COMPLETE CBC W/AUTO DIFF WBC: CPT | Performed by: INTERNAL MEDICINE

## 2021-10-12 PROCEDURE — 4010F PR ACE/ARB THEARPY RXD/TAKEN: ICD-10-PCS | Mod: CPTII,S$GLB,, | Performed by: INTERNAL MEDICINE

## 2021-10-12 PROCEDURE — 3079F DIAST BP 80-89 MM HG: CPT | Mod: CPTII,S$GLB,, | Performed by: INTERNAL MEDICINE

## 2021-10-12 PROCEDURE — 3079F PR MOST RECENT DIASTOLIC BLOOD PRESSURE 80-89 MM HG: ICD-10-PCS | Mod: CPTII,S$GLB,, | Performed by: INTERNAL MEDICINE

## 2021-10-12 PROCEDURE — 3008F BODY MASS INDEX DOCD: CPT | Mod: CPTII,S$GLB,, | Performed by: INTERNAL MEDICINE

## 2021-10-12 PROCEDURE — 3074F PR MOST RECENT SYSTOLIC BLOOD PRESSURE < 130 MM HG: ICD-10-PCS | Mod: CPTII,S$GLB,, | Performed by: INTERNAL MEDICINE

## 2021-10-12 PROCEDURE — 4010F ACE/ARB THERAPY RXD/TAKEN: CPT | Mod: CPTII,S$GLB,, | Performed by: INTERNAL MEDICINE

## 2021-10-12 PROCEDURE — 99499 UNLISTED E&M SERVICE: CPT | Mod: S$GLB,,, | Performed by: INTERNAL MEDICINE

## 2021-10-12 PROCEDURE — 3074F SYST BP LT 130 MM HG: CPT | Mod: CPTII,S$GLB,, | Performed by: INTERNAL MEDICINE

## 2021-10-12 PROCEDURE — 1159F PR MEDICATION LIST DOCUMENTED IN MEDICAL RECORD: ICD-10-PCS | Mod: CPTII,S$GLB,, | Performed by: INTERNAL MEDICINE

## 2021-10-12 PROCEDURE — 99213 PR OFFICE/OUTPT VISIT, EST, LEVL III, 20-29 MIN: ICD-10-PCS | Mod: S$GLB,,, | Performed by: INTERNAL MEDICINE

## 2021-10-12 PROCEDURE — 99999 PR PBB SHADOW E&M-EST. PATIENT-LVL III: CPT | Mod: PBBFAC,,, | Performed by: INTERNAL MEDICINE

## 2021-10-13 ENCOUNTER — CLINICAL SUPPORT (OUTPATIENT)
Dept: BARIATRICS | Facility: CLINIC | Age: 51
End: 2021-10-13
Payer: MEDICARE

## 2021-10-13 ENCOUNTER — OFFICE VISIT (OUTPATIENT)
Dept: BARIATRICS | Facility: CLINIC | Age: 51
End: 2021-10-13
Payer: MEDICARE

## 2021-10-13 VITALS
DIASTOLIC BLOOD PRESSURE: 84 MMHG | BODY MASS INDEX: 100.89 KG/M2 | SYSTOLIC BLOOD PRESSURE: 128 MMHG | OXYGEN SATURATION: 84 % | HEART RATE: 86 BPM | WEIGHT: 315 LBS

## 2021-10-13 VITALS — BODY MASS INDEX: 50.62 KG/M2 | WEIGHT: 315 LBS | HEIGHT: 66 IN

## 2021-10-13 DIAGNOSIS — L30.4 INTERTRIGO: ICD-10-CM

## 2021-10-13 DIAGNOSIS — E66.01 CLASS 3 SEVERE OBESITY DUE TO EXCESS CALORIES WITH SERIOUS COMORBIDITY AND BODY MASS INDEX (BMI) GREATER THAN OR EQUAL TO 70 IN ADULT: ICD-10-CM

## 2021-10-13 DIAGNOSIS — E66.01 MORBID OBESITY WITH BODY MASS INDEX OF 70 AND OVER IN ADULT: Chronic | ICD-10-CM

## 2021-10-13 DIAGNOSIS — R73.03 PREDIABETES: ICD-10-CM

## 2021-10-13 DIAGNOSIS — G47.33 OBSTRUCTIVE SLEEP APNEA ON CPAP: ICD-10-CM

## 2021-10-13 DIAGNOSIS — I10 ESSENTIAL HYPERTENSION: ICD-10-CM

## 2021-10-13 DIAGNOSIS — Z71.3 DIETARY COUNSELING: ICD-10-CM

## 2021-10-13 PROCEDURE — 97803 PR MED NUTR THER, SUBSQ, INDIV, EA 15 MIN: ICD-10-PCS | Mod: S$GLB,,, | Performed by: DIETITIAN, REGISTERED

## 2021-10-13 PROCEDURE — 4010F ACE/ARB THERAPY RXD/TAKEN: CPT | Mod: CPTII,S$GLB,, | Performed by: INTERNAL MEDICINE

## 2021-10-13 PROCEDURE — 99213 OFFICE O/P EST LOW 20 MIN: CPT | Mod: S$GLB,,, | Performed by: INTERNAL MEDICINE

## 2021-10-13 PROCEDURE — 3044F HG A1C LEVEL LT 7.0%: CPT | Mod: CPTII,S$GLB,, | Performed by: INTERNAL MEDICINE

## 2021-10-13 PROCEDURE — 3008F PR BODY MASS INDEX (BMI) DOCUMENTED: ICD-10-PCS | Mod: CPTII,S$GLB,, | Performed by: INTERNAL MEDICINE

## 2021-10-13 PROCEDURE — 99213 PR OFFICE/OUTPT VISIT, EST, LEVL III, 20-29 MIN: ICD-10-PCS | Mod: S$GLB,,, | Performed by: INTERNAL MEDICINE

## 2021-10-13 PROCEDURE — 99999 PR PBB SHADOW E&M-EST. PATIENT-LVL IV: ICD-10-PCS | Mod: PBBFAC,,, | Performed by: INTERNAL MEDICINE

## 2021-10-13 PROCEDURE — 1160F RVW MEDS BY RX/DR IN RCRD: CPT | Mod: CPTII,S$GLB,, | Performed by: INTERNAL MEDICINE

## 2021-10-13 PROCEDURE — 99499 NO LOS: ICD-10-PCS | Mod: S$GLB,,, | Performed by: DIETITIAN, REGISTERED

## 2021-10-13 PROCEDURE — 3044F PR MOST RECENT HEMOGLOBIN A1C LEVEL <7.0%: ICD-10-PCS | Mod: CPTII,S$GLB,, | Performed by: INTERNAL MEDICINE

## 2021-10-13 PROCEDURE — 3074F SYST BP LT 130 MM HG: CPT | Mod: CPTII,S$GLB,, | Performed by: INTERNAL MEDICINE

## 2021-10-13 PROCEDURE — 1159F MED LIST DOCD IN RCRD: CPT | Mod: CPTII,S$GLB,, | Performed by: INTERNAL MEDICINE

## 2021-10-13 PROCEDURE — 97803 MED NUTRITION INDIV SUBSEQ: CPT | Mod: S$GLB,,, | Performed by: DIETITIAN, REGISTERED

## 2021-10-13 PROCEDURE — 3079F PR MOST RECENT DIASTOLIC BLOOD PRESSURE 80-89 MM HG: ICD-10-PCS | Mod: CPTII,S$GLB,, | Performed by: INTERNAL MEDICINE

## 2021-10-13 PROCEDURE — 4010F PR ACE/ARB THEARPY RXD/TAKEN: ICD-10-PCS | Mod: CPTII,S$GLB,, | Performed by: INTERNAL MEDICINE

## 2021-10-13 PROCEDURE — 99999 PR PBB SHADOW E&M-EST. PATIENT-LVL II: CPT | Mod: PBBFAC,,, | Performed by: DIETITIAN, REGISTERED

## 2021-10-13 PROCEDURE — 99999 PR PBB SHADOW E&M-EST. PATIENT-LVL IV: CPT | Mod: PBBFAC,,, | Performed by: INTERNAL MEDICINE

## 2021-10-13 PROCEDURE — 3008F BODY MASS INDEX DOCD: CPT | Mod: CPTII,S$GLB,, | Performed by: INTERNAL MEDICINE

## 2021-10-13 PROCEDURE — 99999 PR PBB SHADOW E&M-EST. PATIENT-LVL II: ICD-10-PCS | Mod: PBBFAC,,, | Performed by: DIETITIAN, REGISTERED

## 2021-10-13 PROCEDURE — 1160F PR REVIEW ALL MEDS BY PRESCRIBER/CLIN PHARMACIST DOCUMENTED: ICD-10-PCS | Mod: CPTII,S$GLB,, | Performed by: INTERNAL MEDICINE

## 2021-10-13 PROCEDURE — 3074F PR MOST RECENT SYSTOLIC BLOOD PRESSURE < 130 MM HG: ICD-10-PCS | Mod: CPTII,S$GLB,, | Performed by: INTERNAL MEDICINE

## 2021-10-13 PROCEDURE — 99499 UNLISTED E&M SERVICE: CPT | Mod: S$GLB,,, | Performed by: DIETITIAN, REGISTERED

## 2021-10-13 PROCEDURE — 3079F DIAST BP 80-89 MM HG: CPT | Mod: CPTII,S$GLB,, | Performed by: INTERNAL MEDICINE

## 2021-10-13 PROCEDURE — 1159F PR MEDICATION LIST DOCUMENTED IN MEDICAL RECORD: ICD-10-PCS | Mod: CPTII,S$GLB,, | Performed by: INTERNAL MEDICINE

## 2021-10-13 RX ORDER — SEMAGLUTIDE 1.34 MG/ML
1 INJECTION, SOLUTION SUBCUTANEOUS
Qty: 3 PEN | Refills: 1 | Status: SHIPPED | OUTPATIENT
Start: 2021-10-13 | End: 2022-01-24 | Stop reason: SDUPTHER

## 2021-10-13 RX ORDER — TOPIRAMATE 200 MG/1
200 TABLET ORAL 2 TIMES DAILY
Qty: 180 TABLET | Refills: 1 | Status: SHIPPED | OUTPATIENT
Start: 2021-10-13 | End: 2022-01-24 | Stop reason: SDUPTHER

## 2021-10-13 RX ORDER — NYSTATIN 100000 [USP'U]/G
POWDER TOPICAL 2 TIMES DAILY
Qty: 60 G | Refills: 5 | Status: SHIPPED | OUTPATIENT
Start: 2021-10-13

## 2021-10-14 RX ORDER — FUROSEMIDE 40 MG/1
TABLET ORAL
Qty: 180 TABLET | Refills: 1 | Status: SHIPPED | OUTPATIENT
Start: 2021-10-14 | End: 2022-04-20

## 2021-10-18 ENCOUNTER — TELEPHONE (OUTPATIENT)
Dept: INTERNAL MEDICINE | Facility: CLINIC | Age: 51
End: 2021-10-18

## 2021-10-22 ENCOUNTER — OFFICE VISIT (OUTPATIENT)
Dept: INTERNAL MEDICINE | Facility: CLINIC | Age: 51
End: 2021-10-22
Payer: MEDICARE

## 2021-10-22 DIAGNOSIS — Z00.00 HEALTHCARE MAINTENANCE: ICD-10-CM

## 2021-10-22 DIAGNOSIS — E66.01 MORBID OBESITY: Primary | ICD-10-CM

## 2021-10-22 DIAGNOSIS — Z71.3 DIETARY COUNSELING AND SURVEILLANCE: ICD-10-CM

## 2021-10-22 DIAGNOSIS — Z12.11 COLON CANCER SCREENING: ICD-10-CM

## 2021-10-22 PROCEDURE — 99212 OFFICE O/P EST SF 10 MIN: CPT | Mod: 95,,, | Performed by: INTERNAL MEDICINE

## 2021-10-22 PROCEDURE — 3044F HG A1C LEVEL LT 7.0%: CPT | Mod: CPTII,95,, | Performed by: INTERNAL MEDICINE

## 2021-10-22 PROCEDURE — 99212 PR OFFICE/OUTPT VISIT, EST, LEVL II, 10-19 MIN: ICD-10-PCS | Mod: 95,,, | Performed by: INTERNAL MEDICINE

## 2021-10-22 PROCEDURE — 4010F PR ACE/ARB THEARPY RXD/TAKEN: ICD-10-PCS | Mod: CPTII,95,, | Performed by: INTERNAL MEDICINE

## 2021-10-22 PROCEDURE — 3044F PR MOST RECENT HEMOGLOBIN A1C LEVEL <7.0%: ICD-10-PCS | Mod: CPTII,95,, | Performed by: INTERNAL MEDICINE

## 2021-10-22 PROCEDURE — 4010F ACE/ARB THERAPY RXD/TAKEN: CPT | Mod: CPTII,95,, | Performed by: INTERNAL MEDICINE

## 2021-11-26 ENCOUNTER — TELEPHONE (OUTPATIENT)
Dept: BARIATRICS | Facility: CLINIC | Age: 51
End: 2021-11-26
Payer: MEDICARE

## 2021-12-10 RX ORDER — POTASSIUM CHLORIDE 750 MG/1
CAPSULE, EXTENDED RELEASE ORAL
Qty: 90 CAPSULE | Refills: 3 | OUTPATIENT
Start: 2021-12-10

## 2021-12-29 ENCOUNTER — TELEPHONE (OUTPATIENT)
Dept: INTERNAL MEDICINE | Facility: CLINIC | Age: 51
End: 2021-12-29
Payer: MEDICARE

## 2022-01-24 ENCOUNTER — OFFICE VISIT (OUTPATIENT)
Dept: INTERNAL MEDICINE | Facility: CLINIC | Age: 52
End: 2022-01-24
Payer: MEDICARE

## 2022-01-24 VITALS
BODY MASS INDEX: 50.62 KG/M2 | HEIGHT: 66 IN | OXYGEN SATURATION: 97 % | WEIGHT: 315 LBS | HEART RATE: 92 BPM | DIASTOLIC BLOOD PRESSURE: 70 MMHG | SYSTOLIC BLOOD PRESSURE: 120 MMHG

## 2022-01-24 DIAGNOSIS — E66.01 MORBID OBESITY WITH BODY MASS INDEX OF 70 AND OVER IN ADULT: ICD-10-CM

## 2022-01-24 DIAGNOSIS — R20.2 NUMBNESS AND TINGLING OF BOTH LEGS: ICD-10-CM

## 2022-01-24 DIAGNOSIS — E66.01 MORBID OBESITY: Primary | ICD-10-CM

## 2022-01-24 DIAGNOSIS — I10 ESSENTIAL HYPERTENSION: ICD-10-CM

## 2022-01-24 DIAGNOSIS — R53.82 CHRONIC FATIGUE: ICD-10-CM

## 2022-01-24 DIAGNOSIS — J41.0 CHRONIC BRONCHITIS, SIMPLE: ICD-10-CM

## 2022-01-24 DIAGNOSIS — R20.0 NUMBNESS AND TINGLING OF BOTH LEGS: ICD-10-CM

## 2022-01-24 DIAGNOSIS — L21.9 SEBORRHEIC DERMATITIS OF SCALP: ICD-10-CM

## 2022-01-24 PROCEDURE — 3074F PR MOST RECENT SYSTOLIC BLOOD PRESSURE < 130 MM HG: ICD-10-PCS | Mod: CPTII,S$GLB,, | Performed by: INTERNAL MEDICINE

## 2022-01-24 PROCEDURE — 99999 PR PBB SHADOW E&M-EST. PATIENT-LVL IV: CPT | Mod: PBBFAC,,, | Performed by: INTERNAL MEDICINE

## 2022-01-24 PROCEDURE — 99499 UNLISTED E&M SERVICE: CPT | Mod: S$GLB,,, | Performed by: INTERNAL MEDICINE

## 2022-01-24 PROCEDURE — 3078F PR MOST RECENT DIASTOLIC BLOOD PRESSURE < 80 MM HG: ICD-10-PCS | Mod: CPTII,S$GLB,, | Performed by: INTERNAL MEDICINE

## 2022-01-24 PROCEDURE — 3008F BODY MASS INDEX DOCD: CPT | Mod: CPTII,S$GLB,, | Performed by: INTERNAL MEDICINE

## 2022-01-24 PROCEDURE — 1159F PR MEDICATION LIST DOCUMENTED IN MEDICAL RECORD: ICD-10-PCS | Mod: CPTII,S$GLB,, | Performed by: INTERNAL MEDICINE

## 2022-01-24 PROCEDURE — 99999 PR PBB SHADOW E&M-EST. PATIENT-LVL IV: ICD-10-PCS | Mod: PBBFAC,,, | Performed by: INTERNAL MEDICINE

## 2022-01-24 PROCEDURE — 3074F SYST BP LT 130 MM HG: CPT | Mod: CPTII,S$GLB,, | Performed by: INTERNAL MEDICINE

## 2022-01-24 PROCEDURE — 99214 PR OFFICE/OUTPT VISIT, EST, LEVL IV, 30-39 MIN: ICD-10-PCS | Mod: S$GLB,,, | Performed by: INTERNAL MEDICINE

## 2022-01-24 PROCEDURE — 1159F MED LIST DOCD IN RCRD: CPT | Mod: CPTII,S$GLB,, | Performed by: INTERNAL MEDICINE

## 2022-01-24 PROCEDURE — 99214 OFFICE O/P EST MOD 30 MIN: CPT | Mod: S$GLB,,, | Performed by: INTERNAL MEDICINE

## 2022-01-24 PROCEDURE — 3078F DIAST BP <80 MM HG: CPT | Mod: CPTII,S$GLB,, | Performed by: INTERNAL MEDICINE

## 2022-01-24 PROCEDURE — 99499 RISK ADDL DX/OHS AUDIT: ICD-10-PCS | Mod: S$GLB,,, | Performed by: INTERNAL MEDICINE

## 2022-01-24 PROCEDURE — 3008F PR BODY MASS INDEX (BMI) DOCUMENTED: ICD-10-PCS | Mod: CPTII,S$GLB,, | Performed by: INTERNAL MEDICINE

## 2022-01-24 RX ORDER — KETOCONAZOLE 20 MG/ML
SHAMPOO, SUSPENSION TOPICAL
Qty: 120 ML | Refills: 3 | Status: SHIPPED | OUTPATIENT
Start: 2022-01-24 | End: 2022-01-24

## 2022-01-24 RX ORDER — TOPIRAMATE 200 MG/1
200 TABLET ORAL 2 TIMES DAILY
Qty: 180 TABLET | Refills: 1 | Status: SHIPPED | OUTPATIENT
Start: 2022-01-24 | End: 2022-11-07

## 2022-01-24 RX ORDER — SEMAGLUTIDE 1.34 MG/ML
1 INJECTION, SOLUTION SUBCUTANEOUS
Qty: 3 PEN | Refills: 1 | Status: SHIPPED | OUTPATIENT
Start: 2022-01-24 | End: 2022-08-30

## 2022-01-24 RX ORDER — KETOCONAZOLE 20 MG/ML
SHAMPOO, SUSPENSION TOPICAL
Qty: 120 ML | Refills: 3 | Status: SHIPPED | OUTPATIENT
Start: 2022-01-24 | End: 2022-11-07

## 2022-01-24 NOTE — PROGRESS NOTES
Subjective:       Patient ID: Ke Oliver is a 51 y.o. male.    Chief Complaint: Follow-up (Swelling and tingling feeling in thighs)      Ke Oliver is a 51 y.o. year old male     HPI  51 y.o. with morbid obesity, HTN, GERD, prediabetes presents for follow up. Weight gain, fatigue, numbness to anterior bilateral thighs. Fatigue is chronic.     Review of Systems   Constitutional: Positive for unexpected weight change (not losing weight as wanting). Negative for activity change, appetite change, chills, fatigue and fever.   HENT: Negative for congestion, rhinorrhea and sore throat.    Eyes: Negative for visual disturbance.   Respiratory: Negative for shortness of breath.    Cardiovascular: Negative for chest pain.   Gastrointestinal: Negative for abdominal pain, diarrhea, nausea and vomiting.   Genitourinary: Negative for difficulty urinating and dysuria.   Musculoskeletal: Negative for arthralgias, back pain and myalgias.   Skin: Negative for color change and rash.   Neurological: Negative for dizziness, weakness and headaches.         Past Medical History:   Diagnosis Date    Bilateral primary osteoarthritis of knee     Hypertension     Obesity     Sleep apnea         Prior to Admission medications    Medication Sig Start Date End Date Taking? Authorizing Provider   acetaminophen (TYLENOL) 325 MG tablet Take 2 tablets (650 mg total) by mouth every 6 (six) hours as needed (arthritis pain). 9/29/19   Jaylan Allen MD   albuterol (ACCUNEB) 0.63 mg/3 mL Nebu Take 3 mLs (0.63 mg total) by nebulization every 6 (six) hours as needed. Rescue 4/12/21 4/12/22  Corey Craig MD   BREO ELLIPTA 100-25 mcg/dose diskus inhaler INHALE 1 PUFF INTO THE LUNGS ONCE DAILY 8/26/20   Corey Craig MD   empagliflozin (JARDIANCE) 10 mg tablet Take 1 tablet (10 mg total) by mouth once daily. 7/13/21   Aby Anaya MD   ergocalciferol (ERGOCALCIFEROL) 50,000 unit Cap TAKE 1 CAPSULE BY MOUTH ONE TIME PER WEEK  "2/9/21   Corey Craig MD   furosemide (LASIX) 40 MG tablet TAKE 1 TABLET BY MOUTH TWICE A DAY 10/14/21   Marcos Zavala MD   ketoconazole (NIZORAL) 2 % shampoo Apply topically twice a week. Apply to scalp, lather, let sit 2-5 minutes, then rinse 3/8/21   Corey Craig MD   lisinopriL (PRINIVIL,ZESTRIL) 2.5 MG tablet TAKE 1 TABLET BY MOUTH EVERY DAY 12/16/20   Corey Craig MD   nystatin (MYCOSTATIN) powder Apply topically 2 (two) times daily. 10/13/21   Aby Anaya MD   omeprazole (PRILOSEC) 40 MG capsule TAKE 1 CAPSULE BY MOUTH EVERY DAY 8/10/21   Starr Mccormick PA-C   potassium chloride (MICRO-K) 10 MEQ CpSR TAKE 1 CAPSULE BY MOUTH ONCE DAILY. WHEN TAKING FUROSEMIDE(LASIX) FOR 60 DOSES 12/16/20   Corey Craig MD   semaglutide (OZEMPIC) 1 mg/dose (4 mg/3 mL) Inject 1 mg into the skin every 7 days. 10/13/21 10/13/22  Aby Anaya MD   topiramate (TOPAMAX) 200 MG Tab Take 1 tablet (200 mg total) by mouth 2 (two) times daily. 10/13/21 11/12/21  Aby Anaya MD        Past medical history, surgical history, and family medical history reviewed and updated as appropriate.    Medications and allergies reviewed.     Objective:          Vitals:    01/24/22 1004   BP: 120/70   BP Location: Left arm   Patient Position: Sitting   BP Method: Large (Manual)   Pulse: 92   SpO2: 97%   Weight: (!) 267.8 kg (590 lb 6.3 oz)   Height: 5' 6" (1.676 m)     Body mass index is 95.29 kg/m².  Physical Exam  Constitutional:       Appearance: He is obese.   HENT:      Head: Normocephalic and atraumatic.   Eyes:      Extraocular Movements: Extraocular movements intact.   Cardiovascular:      Rate and Rhythm: Normal rate.   Pulmonary:      Effort: No respiratory distress.   Abdominal:      General: There is distension.      Comments: Abdomen distended  Non tender   Musculoskeletal:      Right lower leg: Edema present.      Left lower leg: Edema present.      Comments: 1+ bilateral, dependent  No swelling to thighs "   Skin:     General: Skin is warm and dry.   Neurological:      General: No focal deficit present.      Mental Status: He is alert and oriented to person, place, and time.   Psychiatric:         Mood and Affect: Mood normal.         Behavior: Behavior normal.         Lab Results   Component Value Date    WBC 8.50 10/12/2021    HGB 13.7 (L) 10/12/2021    HCT 44.1 10/12/2021     10/12/2021    CHOL 191 10/12/2021    TRIG 86 10/12/2021    HDL 48 10/12/2021    ALT 7 (L) 10/12/2021    AST 12 10/12/2021     10/12/2021    K 4.4 10/12/2021     10/12/2021    CREATININE 0.8 10/12/2021    BUN 11 10/12/2021    CO2 24 10/12/2021    TSH 1.766 09/30/2020    PSA 0.24 08/19/2013    INR 1.0 09/27/2019    GLUF 99 10/12/2016    HGBA1C 5.6 10/12/2021       Assessment:       1. Morbid obesity    2. Seborrheic dermatitis of scalp    3. Chronic bronchitis, simple    4. Numbness and tingling of both legs    5. Essential hypertension    6. Morbid obesity with body mass index of 70 and over in adult    7. Chronic fatigue          Plan:     Ke was seen today for follow-up.    Diagnoses and all orders for this visit:    Morbid obesity  -     semaglutide (OZEMPIC) 1 mg/dose (4 mg/3 mL); Inject 1 mg into the skin every 7 days.  -     topiramate (TOPAMAX) 200 MG Tab; Take 1 tablet (200 mg total) by mouth 2 (two) times daily.  -     TSH; Future    Seborrheic dermatitis of scalp  -     ketoconazole (NIZORAL) 2 % shampoo; Apply topically twice a week. Apply to scalp, lather, let sit 2-5 minutes, then rinse    Chronic bronchitis, simple    Numbness and tingling of both legs  -     Basic Metabolic Panel; Future  -     Folate; Future  -     Vitamin B12; Future  -     VITAMIN B1; Future  -     TSH; Future  -     Testosterone; Future  -     Magnesium; Future    Essential hypertension    Morbid obesity with body mass index of 70 and over in adult    Chronic fatigue  -     Testosterone; Future        Health maintenance reviewed with  patient.     Follow up in about 3 months (around 4/24/2022).    Marcos Zavala MD  Internal Medicine / Primary Care  [facility]  1/24/2022

## 2022-01-24 NOTE — PATIENT INSTRUCTIONS
Morning bloodwork to check testosterone levels  Schedule your appointment to follow up with bariatric medicine.  Prescriptions printed out for you in clinic to continue.     Return to clinic in 3 months or sooner if needed.     You need to weigh your food with your food scale and track calories per portion.

## 2022-01-25 ENCOUNTER — LAB VISIT (OUTPATIENT)
Dept: LAB | Facility: HOSPITAL | Age: 52
End: 2022-01-25
Attending: INTERNAL MEDICINE
Payer: MEDICARE

## 2022-01-25 DIAGNOSIS — E66.01 MORBID OBESITY: ICD-10-CM

## 2022-01-25 DIAGNOSIS — R53.82 CHRONIC FATIGUE: ICD-10-CM

## 2022-01-25 DIAGNOSIS — R20.2 NUMBNESS AND TINGLING OF BOTH LEGS: ICD-10-CM

## 2022-01-25 DIAGNOSIS — R20.0 NUMBNESS AND TINGLING OF BOTH LEGS: ICD-10-CM

## 2022-01-25 LAB
ANION GAP SERPL CALC-SCNC: 10 MMOL/L (ref 8–16)
BUN SERPL-MCNC: 11 MG/DL (ref 6–20)
CALCIUM SERPL-MCNC: 9.4 MG/DL (ref 8.7–10.5)
CHLORIDE SERPL-SCNC: 104 MMOL/L (ref 95–110)
CO2 SERPL-SCNC: 24 MMOL/L (ref 23–29)
CREAT SERPL-MCNC: 0.8 MG/DL (ref 0.5–1.4)
EST. GFR  (AFRICAN AMERICAN): >60 ML/MIN/1.73 M^2
EST. GFR  (NON AFRICAN AMERICAN): >60 ML/MIN/1.73 M^2
FOLATE SERPL-MCNC: 11.1 NG/ML (ref 4–24)
GLUCOSE SERPL-MCNC: 99 MG/DL (ref 70–110)
MAGNESIUM SERPL-MCNC: 2 MG/DL (ref 1.6–2.6)
POTASSIUM SERPL-SCNC: 4.3 MMOL/L (ref 3.5–5.1)
SODIUM SERPL-SCNC: 138 MMOL/L (ref 136–145)
TESTOST SERPL-MCNC: 275 NG/DL (ref 304–1227)
TSH SERPL DL<=0.005 MIU/L-ACNC: 2.43 UIU/ML (ref 0.4–4)
VIT B12 SERPL-MCNC: 596 PG/ML (ref 210–950)

## 2022-01-25 PROCEDURE — 83735 ASSAY OF MAGNESIUM: CPT | Performed by: INTERNAL MEDICINE

## 2022-01-25 PROCEDURE — 82746 ASSAY OF FOLIC ACID SERUM: CPT | Performed by: INTERNAL MEDICINE

## 2022-01-25 PROCEDURE — 84425 ASSAY OF VITAMIN B-1: CPT | Performed by: INTERNAL MEDICINE

## 2022-01-25 PROCEDURE — 84403 ASSAY OF TOTAL TESTOSTERONE: CPT | Performed by: INTERNAL MEDICINE

## 2022-01-25 PROCEDURE — 82607 VITAMIN B-12: CPT | Performed by: INTERNAL MEDICINE

## 2022-01-25 PROCEDURE — 80048 BASIC METABOLIC PNL TOTAL CA: CPT | Performed by: INTERNAL MEDICINE

## 2022-01-25 PROCEDURE — 84443 ASSAY THYROID STIM HORMONE: CPT | Performed by: INTERNAL MEDICINE

## 2022-02-01 ENCOUNTER — TELEPHONE (OUTPATIENT)
Dept: INTERNAL MEDICINE | Facility: CLINIC | Age: 52
End: 2022-02-01
Payer: MEDICARE

## 2022-02-02 NOTE — TELEPHONE ENCOUNTER
----- Message from Shana Gaines sent at 2/1/2022  9:10 AM CST -----  Regarding: Specimen Issue  There was an issue with the Thiamine test ordered on this patient from 1/25/22     Unfortunately, the reference lab received serum/plasma and whole blood is required for testing. The order has been cancelled. You will need to reorder and contact the patient for recollection if clinically indicated.    If there are any questions, please call the [facility] lab at [phone] ext. [phone].  Anyone in the [facility] lab will be able to assist you.

## 2022-02-15 DIAGNOSIS — E66.01 MORBID OBESITY WITH BODY MASS INDEX OF 70 AND OVER IN ADULT: Primary | ICD-10-CM

## 2022-02-15 DIAGNOSIS — R79.89 LOW TESTOSTERONE: ICD-10-CM

## 2022-02-15 DIAGNOSIS — R53.82 CHRONIC FATIGUE: ICD-10-CM

## 2022-02-17 ENCOUNTER — OFFICE VISIT (OUTPATIENT)
Dept: UROLOGY | Facility: CLINIC | Age: 52
End: 2022-02-17
Payer: MEDICARE

## 2022-02-17 VITALS
HEART RATE: 89 BPM | DIASTOLIC BLOOD PRESSURE: 86 MMHG | BODY MASS INDEX: 52.48 KG/M2 | WEIGHT: 315 LBS | HEIGHT: 65 IN | SYSTOLIC BLOOD PRESSURE: 148 MMHG

## 2022-02-17 DIAGNOSIS — N40.1 BPH WITH URINARY OBSTRUCTION: ICD-10-CM

## 2022-02-17 DIAGNOSIS — N52.01 ERECTILE DYSFUNCTION DUE TO ARTERIAL INSUFFICIENCY: ICD-10-CM

## 2022-02-17 DIAGNOSIS — R53.82 CHRONIC FATIGUE: Primary | ICD-10-CM

## 2022-02-17 DIAGNOSIS — N13.8 BPH WITH URINARY OBSTRUCTION: ICD-10-CM

## 2022-02-17 DIAGNOSIS — I10 ESSENTIAL HYPERTENSION: ICD-10-CM

## 2022-02-17 PROBLEM — N52.9 ERECTILE DYSFUNCTION: Status: ACTIVE | Noted: 2022-02-17

## 2022-02-17 PROCEDURE — 3077F PR MOST RECENT SYSTOLIC BLOOD PRESSURE >= 140 MM HG: ICD-10-PCS | Mod: CPTII,S$GLB,, | Performed by: UROLOGY

## 2022-02-17 PROCEDURE — 3008F BODY MASS INDEX DOCD: CPT | Mod: CPTII,S$GLB,, | Performed by: UROLOGY

## 2022-02-17 PROCEDURE — 3008F PR BODY MASS INDEX (BMI) DOCUMENTED: ICD-10-PCS | Mod: CPTII,S$GLB,, | Performed by: UROLOGY

## 2022-02-17 PROCEDURE — 99204 OFFICE O/P NEW MOD 45 MIN: CPT | Mod: S$GLB,,, | Performed by: UROLOGY

## 2022-02-17 PROCEDURE — 1160F PR REVIEW ALL MEDS BY PRESCRIBER/CLIN PHARMACIST DOCUMENTED: ICD-10-PCS | Mod: CPTII,S$GLB,, | Performed by: UROLOGY

## 2022-02-17 PROCEDURE — 1159F MED LIST DOCD IN RCRD: CPT | Mod: CPTII,S$GLB,, | Performed by: UROLOGY

## 2022-02-17 PROCEDURE — 99499 RISK ADDL DX/OHS AUDIT: ICD-10-PCS | Mod: S$GLB,,, | Performed by: UROLOGY

## 2022-02-17 PROCEDURE — 1160F RVW MEDS BY RX/DR IN RCRD: CPT | Mod: CPTII,S$GLB,, | Performed by: UROLOGY

## 2022-02-17 PROCEDURE — 3077F SYST BP >= 140 MM HG: CPT | Mod: CPTII,S$GLB,, | Performed by: UROLOGY

## 2022-02-17 PROCEDURE — 1159F PR MEDICATION LIST DOCUMENTED IN MEDICAL RECORD: ICD-10-PCS | Mod: CPTII,S$GLB,, | Performed by: UROLOGY

## 2022-02-17 PROCEDURE — 99499 UNLISTED E&M SERVICE: CPT | Mod: S$GLB,,, | Performed by: UROLOGY

## 2022-02-17 PROCEDURE — 99204 PR OFFICE/OUTPT VISIT, NEW, LEVL IV, 45-59 MIN: ICD-10-PCS | Mod: S$GLB,,, | Performed by: UROLOGY

## 2022-02-17 PROCEDURE — 99999 PR PBB SHADOW E&M-EST. PATIENT-LVL III: CPT | Mod: PBBFAC,,, | Performed by: UROLOGY

## 2022-02-17 PROCEDURE — 3079F PR MOST RECENT DIASTOLIC BLOOD PRESSURE 80-89 MM HG: ICD-10-PCS | Mod: CPTII,S$GLB,, | Performed by: UROLOGY

## 2022-02-17 PROCEDURE — 3079F DIAST BP 80-89 MM HG: CPT | Mod: CPTII,S$GLB,, | Performed by: UROLOGY

## 2022-02-17 PROCEDURE — 99999 PR PBB SHADOW E&M-EST. PATIENT-LVL III: ICD-10-PCS | Mod: PBBFAC,,, | Performed by: UROLOGY

## 2022-02-17 RX ORDER — TADALAFIL 20 MG/1
20 TABLET ORAL DAILY
Qty: 10 TABLET | Refills: 11 | Status: SHIPPED | OUTPATIENT
Start: 2022-02-17

## 2022-02-17 NOTE — PROGRESS NOTES
CHIEF COMPLAINT:    Mr. Oliver is a 51 y.o. male presenting for a consultation at the request of Dr. Zavala. Patient presents with fatigue    PRESENTING ILLNESS:    Ke Oliver is a 51 y.o. male with fatigue.  A T was drawn x 1 that is low.    He has ED. Never tried medication.     He hughes LUTS. Nocturia x 1. Not bothered by this.    REVIEW OF SYSTEMS:    Ke Oliver denies headache, blurred vision, fever, nausea, vomiting, chills, abdominal pain, chest pain, sore throat, bleeding per rectum, cough, SOB, recent loss of consciousness, recent mental status changes, seizures, dizziness, or upper or lower extremity weakness.    IRMA  1. 2  2. 1  3. 1  4. 2  5. 1      PATIENT HISTORY:    Past Medical History:   Diagnosis Date    Bilateral primary osteoarthritis of knee     Hypertension     Obesity     Sleep apnea        Past Surgical History:   Procedure Laterality Date    ANKLE SURGERY      ankle fracture pinnned    COLONOSCOPY N/A 2016    COLONOSCOPY;  Surgeon: Carson Mittal MD    HERNIA REPAIR      herniated testicle       Family History   Problem Relation Age of Onset    Cancer Mother         lung    Psoriasis Neg Hx     Eczema Neg Hx        Social History     Socioeconomic History    Marital status:    Tobacco Use    Smoking status: Former Smoker     Packs/day: 1.00     Years: 17.00     Pack years: 17.00     Types: Cigarettes     Quit date: 2001     Years since quittin.5    Smokeless tobacco: Never Used   Substance and Sexual Activity    Alcohol use: Yes     Comment: 3 every other week     Drug use: No   Social History Narrative    Wife  of kidney cancer in 2016       Allergies:  Patient has no known allergies.    Medications:    Current Outpatient Medications:     acetaminophen (TYLENOL) 325 MG tablet, Take 2 tablets (650 mg total) by mouth every 6 (six) hours as needed (arthritis pain)., Disp: , Rfl: 0    albuterol (ACCUNEB) 0.63 mg/3 mL Nebu,  Take 3 mLs (0.63 mg total) by nebulization every 6 (six) hours as needed. Rescue, Disp: 1 Box, Rfl: 11    BREO ELLIPTA 100-25 mcg/dose diskus inhaler, INHALE 1 PUFF INTO THE LUNGS ONCE DAILY, Disp: 180 each, Rfl: 1    empagliflozin (JARDIANCE) 10 mg tablet, Take 1 tablet (10 mg total) by mouth once daily., Disp: 90 tablet, Rfl: 1    ergocalciferol (ERGOCALCIFEROL) 50,000 unit Cap, TAKE 1 CAPSULE BY MOUTH ONE TIME PER WEEK, Disp: 12 capsule, Rfl: 3    furosemide (LASIX) 40 MG tablet, TAKE 1 TABLET BY MOUTH TWICE A DAY, Disp: 180 tablet, Rfl: 1    ketoconazole (NIZORAL) 2 % shampoo, Apply topically twice a week. Apply to scalp, lather, let sit 2-5 minutes, then rinse, Disp: 120 mL, Rfl: 3    lisinopriL (PRINIVIL,ZESTRIL) 2.5 MG tablet, TAKE 1 TABLET BY MOUTH EVERY DAY, Disp: 90 tablet, Rfl: 3    nystatin (MYCOSTATIN) powder, Apply topically 2 (two) times daily., Disp: 60 g, Rfl: 5    omeprazole (PRILOSEC) 40 MG capsule, TAKE 1 CAPSULE BY MOUTH EVERY DAY, Disp: 90 capsule, Rfl: 2    potassium chloride (MICRO-K) 10 MEQ CpSR, TAKE 1 CAPSULE BY MOUTH ONCE DAILY. WHEN TAKING FUROSEMIDE(LASIX) FOR 60 DOSES, Disp: 90 capsule, Rfl: 3    semaglutide (OZEMPIC) 1 mg/dose (4 mg/3 mL), Inject 1 mg into the skin every 7 days., Disp: 3 pen, Rfl: 1    topiramate (TOPAMAX) 200 MG Tab, Take 1 tablet (200 mg total) by mouth 2 (two) times daily., Disp: 180 tablet, Rfl: 1    tadalafiL (CIALIS) 20 MG Tab, Take 1 tablet (20 mg total) by mouth once daily. Take 1 hour before intercourse, Disp: 10 tablet, Rfl: 11    PHYSICAL EXAMINATION:    The patient generally appears in good health, is appropriately interactive, and is in no apparent distress.     Eyes: anicteric sclerae, moist conjunctivae; no lid-lag; PERRLA     HENT: Atraumatic; oropharynx clear with moist mucous membranes and no mucosal ulcerations;normal hard and soft palate.  No evidence of lymphadenopathy.    Neck: Trachea midline.  No thyromegaly.    Skin: No  lesions.    Mental: Cooperative with normal affect.  Is oriented to time, place, and person.    Neuro: Grossly intact.    Chest: Normal inspiratory effort.   No accessory muscles.  No audible wheezes.  Respirations symmetric on inspiration and expiration.    Heart: Regular rhythm.      Abdomen:  Soft, non-tender. No masses or organomegaly. Bladder is not palpable. No evidence of flank discomfort. No evidence of inguinal hernia.    Genitourinary: The penis is not circumcised with no evidence of plaques or induration. The urethral meatus is normal. The testes, epididymides, and cord structures are normal in size and contour bilaterally. The scrotum is normal in size and contour.    Normal anal sphincter tone. No rectal mass.    The prostate is 25 g. Normal landmarks. Lateral sulci. Median furrow intact.  No nodularity or induration. Seminal vesicles are normal.    Extremities: No clubbing, cyanosis, or edema      LABS:    Lab Results   Component Value Date    PSA 0.24 08/19/2013       IMPRESSION:    Encounter Diagnoses   Name Primary?    Chronic fatigue Yes    Erectile dysfunction due to arterial insufficiency     Essential hypertension     BPH with urinary obstruction    HTN, stable      PLAN:    1. Will draw a PSA as he's past due for PCa screening.  2. Will confirm the low T with an AM draw.  Will also draw LH and prolactin.  3. Will observe his LUTS as he is not bothered by this  4. Discussed options for his ED (affected by above comorbidities).  He'd like Cialis. Side effects discussed.  A new Rx was given     Copy to: Lucas

## 2022-02-26 NOTE — TELEPHONE ENCOUNTER
Care Due:                  Date            Visit Type   Department     Provider  --------------------------------------------------------------------------------                                EP -                              PRIMARY      [facility] INTERNAL  Last Visit: 01-      CARE ([facility])   NICOLÁS Zavala                              EP -                              PRIMARY      [facility] INTERNAL  Next Visit: 04-      CARE ([facility])   NICOLÁS Zavala                                                            Last  Test          Frequency    Reason                     Performed    Due Date  --------------------------------------------------------------------------------    HBA1C.......  6 months...  semaglutide..............  10-   04-    Powered by [employer]. Reference number: [id number].   2/26/2022 7:16:44 AM CST

## 2022-02-28 RX ORDER — LISINOPRIL 2.5 MG/1
TABLET ORAL
Qty: 90 TABLET | Refills: 3 | Status: SHIPPED | OUTPATIENT
Start: 2022-02-28 | End: 2024-02-26 | Stop reason: SDUPTHER

## 2022-03-17 ENCOUNTER — PATIENT MESSAGE (OUTPATIENT)
Dept: INTERNAL MEDICINE | Facility: CLINIC | Age: 52
End: 2022-03-17
Payer: MEDICARE

## 2022-04-04 ENCOUNTER — TELEPHONE (OUTPATIENT)
Dept: ADMINISTRATIVE | Facility: CLINIC | Age: 52
End: 2022-04-04
Payer: MEDICARE

## 2022-04-04 ENCOUNTER — PES CALL (OUTPATIENT)
Dept: ADMINISTRATIVE | Facility: CLINIC | Age: 52
End: 2022-04-04
Payer: MEDICARE

## 2022-04-05 ENCOUNTER — TELEPHONE (OUTPATIENT)
Dept: ADMINISTRATIVE | Facility: CLINIC | Age: 52
End: 2022-04-05
Payer: MEDICARE

## 2022-04-06 ENCOUNTER — TELEPHONE (OUTPATIENT)
Dept: ADMINISTRATIVE | Facility: CLINIC | Age: 52
End: 2022-04-06
Payer: MEDICARE

## 2022-04-06 ENCOUNTER — PATIENT MESSAGE (OUTPATIENT)
Dept: ADMINISTRATIVE | Facility: CLINIC | Age: 52
End: 2022-04-06
Payer: MEDICARE

## 2022-04-06 ENCOUNTER — OFFICE VISIT (OUTPATIENT)
Dept: HOME HEALTH SERVICES | Facility: CLINIC | Age: 52
End: 2022-04-06
Payer: MEDICARE

## 2022-04-06 DIAGNOSIS — M17.0 BILATERAL PRIMARY OSTEOARTHRITIS OF KNEE: ICD-10-CM

## 2022-04-06 DIAGNOSIS — E66.01 MORBID OBESITY WITH BODY MASS INDEX OF 70 AND OVER IN ADULT: Chronic | ICD-10-CM

## 2022-04-06 DIAGNOSIS — I10 ESSENTIAL HYPERTENSION: ICD-10-CM

## 2022-04-06 DIAGNOSIS — D50.8 OTHER IRON DEFICIENCY ANEMIA: ICD-10-CM

## 2022-04-06 DIAGNOSIS — R06.02 SOB (SHORTNESS OF BREATH): ICD-10-CM

## 2022-04-06 DIAGNOSIS — J41.0 SIMPLE CHRONIC BRONCHITIS: ICD-10-CM

## 2022-04-06 DIAGNOSIS — R73.03 PREDIABETES: ICD-10-CM

## 2022-04-06 DIAGNOSIS — N13.8 BPH WITH URINARY OBSTRUCTION: ICD-10-CM

## 2022-04-06 DIAGNOSIS — N52.01 ERECTILE DYSFUNCTION DUE TO ARTERIAL INSUFFICIENCY: ICD-10-CM

## 2022-04-06 DIAGNOSIS — G47.33 OBSTRUCTIVE SLEEP APNEA ON CPAP: ICD-10-CM

## 2022-04-06 DIAGNOSIS — N40.1 BPH WITH URINARY OBSTRUCTION: ICD-10-CM

## 2022-04-06 DIAGNOSIS — G25.81 RESTLESS LEG SYNDROME: ICD-10-CM

## 2022-04-06 DIAGNOSIS — Z00.00 ENCOUNTER FOR PREVENTIVE HEALTH EXAMINATION: Primary | ICD-10-CM

## 2022-04-06 PROCEDURE — G0439 PPPS, SUBSEQ VISIT: HCPCS | Mod: 95,,, | Performed by: NURSE PRACTITIONER

## 2022-04-06 PROCEDURE — 1159F PR MEDICATION LIST DOCUMENTED IN MEDICAL RECORD: ICD-10-PCS | Mod: CPTII,95,, | Performed by: NURSE PRACTITIONER

## 2022-04-06 PROCEDURE — 1159F MED LIST DOCD IN RCRD: CPT | Mod: CPTII,95,, | Performed by: NURSE PRACTITIONER

## 2022-04-06 PROCEDURE — G0439 PR MEDICARE ANNUAL WELLNESS SUBSEQUENT VISIT: ICD-10-PCS | Mod: 95,,, | Performed by: NURSE PRACTITIONER

## 2022-04-06 PROCEDURE — 1160F RVW MEDS BY RX/DR IN RCRD: CPT | Mod: CPTII,95,, | Performed by: NURSE PRACTITIONER

## 2022-04-06 PROCEDURE — 4010F PR ACE/ARB THEARPY RXD/TAKEN: ICD-10-PCS | Mod: CPTII,95,, | Performed by: NURSE PRACTITIONER

## 2022-04-06 PROCEDURE — 1160F PR REVIEW ALL MEDS BY PRESCRIBER/CLIN PHARMACIST DOCUMENTED: ICD-10-PCS | Mod: CPTII,95,, | Performed by: NURSE PRACTITIONER

## 2022-04-06 PROCEDURE — 4010F ACE/ARB THERAPY RXD/TAKEN: CPT | Mod: CPTII,95,, | Performed by: NURSE PRACTITIONER

## 2022-04-06 NOTE — PROGRESS NOTES
The patient location is: [place]  The chief complaint leading to consultation is: awv    Visit type: audiovisual    Face to Face time with patient: 60  60 minutes of total time spent on the encounter, which includes face to face time and non-face to face time preparing to see the patient (eg, review of tests), Obtaining and/or reviewing separately obtained history, Documenting clinical information in the electronic or other health record, Independently interpreting results (not separately reported) and communicating results to the patient/family/caregiver, or Care coordination (not separately reported).         Each patient to whom he or she provides medical services by telemedicine is:  (1) informed of the relationship between the physician and patient and the respective role of any other health care provider with respect to management of the patient; and (2) notified that he or she may decline to receive medical services by telemedicine and may withdraw from such care at any time.    Notes:       Ke Oliver presented for a  Medicare AWV and comprehensive Health Risk Assessment today. The following components were reviewed and updated:    · Medical history  · Family History  · Social history  · Allergies and Current Medications  · Health Risk Assessment  · Health Maintenance  · Care Team         ** See Completed Assessments for Annual Wellness Visit within the encounter summary.**         The following assessments were completed:  · Living Situation  · CAGE  · Depression Screening  · Fall Risk Assessment (MACH 10)  · Hearing Assessment(HHI)  · Cognitive Function Screening  · Nutrition Screening  · ADL Screening  · PAQ Screening      There were no vitals filed for this visit.  There is no height or weight on file to calculate BMI.  Physical Exam  Constitutional:       Appearance: Normal appearance. He is obese.   Neurological:      Mental Status: He is alert.   Psychiatric:         Attention and Perception:  Attention and perception normal.         Mood and Affect: Mood and affect normal.         Speech: Speech normal.         Behavior: Behavior normal. Behavior is cooperative.         Thought Content: Thought content normal.         Cognition and Memory: Cognition and memory normal.         Judgment: Judgment normal.               Diagnoses and health risks identified today and associated recommendations/orders:    1. Encounter for preventive health examination  Stable, followed by provider.     2. Restless leg syndrome  Stable, followed by provider.     3. Simple chronic bronchitis  Stable, followed by provider.     4. Essential hypertension  Stable, followed by provider.     5. Erectile dysfunction due to arterial insufficiency  Stable, followed by provider.     6. BPH with urinary obstruction  Stable, followed by provider.     7. Other iron deficiency anemia  Stable, followed by provider.     8. Morbid obesity with body mass index of 70 and over in adult  Stable, followed by provider.     9. Prediabetes  Stable, followed by provider.     10. Bilateral primary osteoarthritis of knee  Stable, followed by provider.     11. Obstructive sleep apnea on CPAP  Stable, followed by provider.     12. SOB (shortness of breath)  Stable, followed by provider.       Provided Ke with a 5-10 year written screening schedule and personal prevention plan. Recommendations were developed using the USPSTF age appropriate recommendations. Education, counseling, and referrals were provided as needed. After Visit Summary printed and given to patient which includes a list of additional screenings\tests needed.    Follow up in about 1 year (around 4/6/2023) for awv.    Adelso Seymour NP  I offered to discuss advanced care planning, including how to pick a person who would make decisions for you if you were unable to make them for yourself, called a health care power of , and what kind of decisions you might make such as use of  life sustaining treatments such as ventilators and tube feeding when faced with a life limiting illness recorded on a living will that they will need to know. (How you want to be cared for as you near the end of your natural life)     X Patient is interested in learning more about how to make advanced directives.  I provided them paperwork and offered to discuss this with them.

## 2022-04-06 NOTE — PATIENT INSTRUCTIONS
Counseling and Referral of Other Preventative  (Italic type indicates deductible and co-insurance are waived)    Patient Name: Ke Oliver  Today's Date: 4/6/2022    Health Maintenance       Date Due Completion Date    TETANUS VACCINE Never done ---    Shingles Vaccine (1 of 2) Never done ---    Colorectal Cancer Screening 08/04/2021 8/4/2016    Influenza Vaccine (1) Never done ---    COVID-19 Vaccine (3 - Booster for Moderna series) 09/07/2021 4/7/2021    Lipid Panel 10/12/2026 10/12/2021        No orders of the defined types were placed in this encounter.    The following information is provided to all patients.  This information is to help you find resources for any of the problems found today that may be affecting your health:                Living healthy guide: www.[facility].[place].gov      Understanding Diabetes: www.diabetes.org      Eating healthy: www.cdc.gov/healthyweight      CDC home safety checklist: www.cdc.gov/steadi/patient.html      Agency on Aging: www.goea.[place].[facility]      Alcoholics anonymous (AA): www.aa.org      Physical Activity: www.molina.nih.gov/xi9psgz      Tobacco use: www.quitwithusla.org

## 2022-04-20 DIAGNOSIS — I50.31 ACUTE DIASTOLIC HEART FAILURE: ICD-10-CM

## 2022-04-20 RX ORDER — FUROSEMIDE 40 MG/1
TABLET ORAL
Qty: 180 TABLET | Refills: 1 | Status: SHIPPED | OUTPATIENT
Start: 2022-04-20

## 2022-04-20 NOTE — TELEPHONE ENCOUNTER
No new care gaps identified.  Powered by [employer]. Reference number: [id number].   4/20/2022 12:17:44 AM CDT

## 2022-04-20 NOTE — TELEPHONE ENCOUNTER
Refill Routing Note   Medication(s) are not appropriate for processing by [facility] for the following reason(s):      - Required vitals are abnormal    [facility] action(s):  Defer          Medication reconciliation completed: No     Appointments  past 12m or future 3m with PCP    Date Provider   Last Visit   1/24/2022 Marcos Zavala MD   Next Visit   4/25/2022 Marcos Zavala MD   ED visits in past 90 days: 0        Note composed:8:22 AM 04/20/2022

## 2022-04-25 ENCOUNTER — PATIENT MESSAGE (OUTPATIENT)
Dept: BARIATRICS | Facility: CLINIC | Age: 52
End: 2022-04-25
Payer: MEDICARE

## 2022-05-09 DIAGNOSIS — K21.9 GASTROESOPHAGEAL REFLUX DISEASE: ICD-10-CM

## 2022-05-09 RX ORDER — OMEPRAZOLE 40 MG/1
CAPSULE, DELAYED RELEASE ORAL
Qty: 90 CAPSULE | Refills: 2 | Status: SHIPPED | OUTPATIENT
Start: 2022-05-09 | End: 2023-02-06

## 2022-05-25 RX ORDER — EMPAGLIFLOZIN 10 MG/1
TABLET, FILM COATED ORAL
Qty: 90 TABLET | Refills: 0 | Status: SHIPPED | OUTPATIENT
Start: 2022-05-25 | End: 2022-10-21

## 2022-05-26 ENCOUNTER — PATIENT MESSAGE (OUTPATIENT)
Dept: BARIATRICS | Facility: CLINIC | Age: 52
End: 2022-05-26
Payer: MEDICARE

## 2022-08-03 ENCOUNTER — PATIENT MESSAGE (OUTPATIENT)
Dept: BARIATRICS | Facility: CLINIC | Age: 52
End: 2022-08-03
Payer: MEDICARE

## 2022-10-06 ENCOUNTER — PATIENT MESSAGE (OUTPATIENT)
Dept: BARIATRICS | Facility: CLINIC | Age: 52
End: 2022-10-06
Payer: MEDICARE

## 2023-02-22 ENCOUNTER — PATIENT MESSAGE (OUTPATIENT)
Dept: BARIATRICS | Facility: CLINIC | Age: 53
End: 2023-02-22
Payer: MEDICARE

## 2023-03-10 ENCOUNTER — OFFICE VISIT (OUTPATIENT)
Dept: INTERNAL MEDICINE | Facility: CLINIC | Age: 53
End: 2023-03-10
Payer: MEDICARE

## 2023-03-10 VITALS — DIASTOLIC BLOOD PRESSURE: 55 MMHG | SYSTOLIC BLOOD PRESSURE: 125 MMHG | HEART RATE: 88 BPM | OXYGEN SATURATION: 93 %

## 2023-03-10 DIAGNOSIS — E78.5 HYPERLIPIDEMIA LDL GOAL <100: ICD-10-CM

## 2023-03-10 DIAGNOSIS — N40.1 BPH WITH URINARY OBSTRUCTION: ICD-10-CM

## 2023-03-10 DIAGNOSIS — Z12.11 COLON CANCER SCREENING: ICD-10-CM

## 2023-03-10 DIAGNOSIS — H02.63 XANTHELASMA OF EYELID, BILATERAL: ICD-10-CM

## 2023-03-10 DIAGNOSIS — Z12.11 SCREENING FOR COLON CANCER: ICD-10-CM

## 2023-03-10 DIAGNOSIS — R73.03 PREDIABETES: ICD-10-CM

## 2023-03-10 DIAGNOSIS — J41.0 SIMPLE CHRONIC BRONCHITIS: ICD-10-CM

## 2023-03-10 DIAGNOSIS — I10 ESSENTIAL HYPERTENSION: ICD-10-CM

## 2023-03-10 DIAGNOSIS — E66.01 MORBID OBESITY WITH BODY MASS INDEX OF 70 AND OVER IN ADULT: ICD-10-CM

## 2023-03-10 DIAGNOSIS — N13.8 BPH WITH URINARY OBSTRUCTION: ICD-10-CM

## 2023-03-10 DIAGNOSIS — Z00.00 ENCOUNTER FOR ANNUAL PHYSICAL EXAM: Primary | ICD-10-CM

## 2023-03-10 DIAGNOSIS — H02.66 XANTHELASMA OF EYELID, BILATERAL: ICD-10-CM

## 2023-03-10 DIAGNOSIS — N52.01 ERECTILE DYSFUNCTION DUE TO ARTERIAL INSUFFICIENCY: ICD-10-CM

## 2023-03-10 DIAGNOSIS — Z00.00 LABORATORY EXAMINATION ORDERED AS PART OF A ROUTINE GENERAL MEDICAL EXAMINATION: ICD-10-CM

## 2023-03-10 DIAGNOSIS — R79.89 LOW TESTOSTERONE: ICD-10-CM

## 2023-03-10 DIAGNOSIS — R53.82 CHRONIC FATIGUE: ICD-10-CM

## 2023-03-10 PROCEDURE — 99999 PR PBB SHADOW E&M-EST. PATIENT-LVL IV: CPT | Mod: PBBFAC,,, | Performed by: INTERNAL MEDICINE

## 2023-03-10 PROCEDURE — 99396 PREV VISIT EST AGE 40-64: CPT | Mod: GZ,S$GLB,, | Performed by: INTERNAL MEDICINE

## 2023-03-10 PROCEDURE — 99499 RISK ADDL DX/OHS AUDIT: ICD-10-PCS | Mod: S$GLB,,, | Performed by: INTERNAL MEDICINE

## 2023-03-10 PROCEDURE — 3078F PR MOST RECENT DIASTOLIC BLOOD PRESSURE < 80 MM HG: ICD-10-PCS | Mod: CPTII,S$GLB,, | Performed by: INTERNAL MEDICINE

## 2023-03-10 PROCEDURE — 1160F PR REVIEW ALL MEDS BY PRESCRIBER/CLIN PHARMACIST DOCUMENTED: ICD-10-PCS | Mod: CPTII,S$GLB,, | Performed by: INTERNAL MEDICINE

## 2023-03-10 PROCEDURE — 1159F PR MEDICATION LIST DOCUMENTED IN MEDICAL RECORD: ICD-10-PCS | Mod: CPTII,S$GLB,, | Performed by: INTERNAL MEDICINE

## 2023-03-10 PROCEDURE — 99499 UNLISTED E&M SERVICE: CPT | Mod: S$GLB,,, | Performed by: INTERNAL MEDICINE

## 2023-03-10 PROCEDURE — 3074F SYST BP LT 130 MM HG: CPT | Mod: CPTII,S$GLB,, | Performed by: INTERNAL MEDICINE

## 2023-03-10 PROCEDURE — 3074F PR MOST RECENT SYSTOLIC BLOOD PRESSURE < 130 MM HG: ICD-10-PCS | Mod: CPTII,S$GLB,, | Performed by: INTERNAL MEDICINE

## 2023-03-10 PROCEDURE — 99396 PR PREVENTIVE VISIT,EST,40-64: ICD-10-PCS | Mod: GZ,S$GLB,, | Performed by: INTERNAL MEDICINE

## 2023-03-10 PROCEDURE — 1159F MED LIST DOCD IN RCRD: CPT | Mod: CPTII,S$GLB,, | Performed by: INTERNAL MEDICINE

## 2023-03-10 PROCEDURE — 3078F DIAST BP <80 MM HG: CPT | Mod: CPTII,S$GLB,, | Performed by: INTERNAL MEDICINE

## 2023-03-10 PROCEDURE — 99999 PR PBB SHADOW E&M-EST. PATIENT-LVL IV: ICD-10-PCS | Mod: PBBFAC,,, | Performed by: INTERNAL MEDICINE

## 2023-03-10 PROCEDURE — 1160F RVW MEDS BY RX/DR IN RCRD: CPT | Mod: CPTII,S$GLB,, | Performed by: INTERNAL MEDICINE

## 2023-03-10 NOTE — PATIENT INSTRUCTIONS
Colonoscopy - a colonoscopy has been ordered for you. In order to schedule this appointment, please call [phone].     Schedule fasting labwork    Referral placed to bariatric medicine to reestablish.    Return to clinic in 4 months or sooner if needed.

## 2023-03-10 NOTE — PROGRESS NOTES
Subjective:       Patient ID: Ke Oliver is a 52 y.o. male.    Chief Complaint: Annual Exam      HPI  Ke Oliver is a 52 y.o. year old male with HTN, sleep apnea on CPAP, b/l osteoarthritis of knee, super obesity (BMI >70) presents for annual exam. Patient seeking counseling regarding weight loss.     Review of Systems   Constitutional:  Positive for activity change and fatigue. Negative for appetite change, chills, fever and unexpected weight change.   HENT:  Negative for congestion, rhinorrhea and sore throat.    Eyes:  Negative for visual disturbance.   Respiratory:  Negative for shortness of breath.    Cardiovascular:  Negative for chest pain.   Gastrointestinal:  Negative for abdominal pain, diarrhea, nausea and vomiting.   Genitourinary:  Negative for difficulty urinating and dysuria.   Musculoskeletal:  Negative for arthralgias, back pain and myalgias.   Skin:  Negative for color change and rash.   Neurological:  Negative for dizziness, weakness and headaches.       Past Medical History:   Diagnosis Date    Bilateral primary osteoarthritis of knee     Hypertension     Obesity     Sleep apnea         Prior to Admission medications    Medication Sig Start Date End Date Taking? Authorizing Provider   acetaminophen (TYLENOL) 325 MG tablet Take 2 tablets (650 mg total) by mouth every 6 (six) hours as needed (arthritis pain). 9/29/19  Yes Jaylan Allen MD   albuterol (ACCUNEB) 0.63 mg/3 mL Nebu Take 3 mLs (0.63 mg total) by nebulization every 6 (six) hours as needed. Rescue 4/12/21 3/10/23 Yes Corey Craig MD   BREO ELLIPTA 100-25 mcg/dose diskus inhaler INHALE 1 PUFF INTO THE LUNGS ONCE DAILY 8/26/20  Yes Corey Craig MD   furosemide (LASIX) 40 MG tablet TAKE 1 TABLET BY MOUTH TWICE A DAY 4/20/22  Yes Marcos Zavala MD   JARDIANCE 10 mg tablet TAKE 1 TABLET BY MOUTH EVERY DAY 1/27/23  Yes Marcos Zavala MD   ketoconazole (NIZORAL) 2 % shampoo APPLY TOPICALLY TWICE A WEEK. APPLY TO SCALP, LATHER,  LET SIT 2-5 MINUTES THEN RINSE. 11/7/22  Yes Marcos Zavala MD   lisinopriL (PRINIVIL,ZESTRIL) 2.5 MG tablet TAKE 1 TABLET BY MOUTH EVERY DAY 2/28/22  Yes Marcos Zavala MD   nystatin (MYCOSTATIN) powder Apply topically 2 (two) times daily. 10/13/21  Yes Aby Anaya MD   omeprazole (PRILOSEC) 40 MG capsule TAKE 1 CAPSULE BY MOUTH EVERY DAY 2/6/23  Yes Marcos Zavala MD   potassium chloride (MICRO-K) 10 MEQ CpSR TAKE 1 CAPSULE BY MOUTH ONCE DAILY. WHEN TAKING FUROSEMIDE(LASIX) FOR 60 DOSES 8/10/22  Yes Marcos Zavala MD   tadalafiL (CIALIS) 20 MG Tab Take 1 tablet (20 mg total) by mouth once daily. Take 1 hour before intercourse 2/17/22  Yes Bentley Ying MD   topiramate (TOPAMAX) 200 MG Tab TAKE 1 TABLET BY MOUTH TWICE DAILY 11/7/22  Yes Marcos Zavala MD   ergocalciferol (ERGOCALCIFEROL) 50,000 unit Cap TAKE 1 CAPSULE BY MOUTH ONE TIME PER WEEK  Patient not taking: Reported on 3/10/2023 2/9/21   Corey Craig MD   semaglutide (OZEMPIC) 1 mg/dose (4 mg/3 mL) INJECT 1 MG INTO THE SKIN EVERY 7 DAYS  Patient not taking: Reported on 3/10/2023 8/30/22 8/30/23  Marcos Zavala MD        Past medical history, surgical history, and family medical history reviewed and updated as appropriate.    Medications and allergies reviewed.     Objective:          Vitals:    03/10/23 0912   BP: (!) 125/55   BP Location: Right arm   Patient Position: Sitting   BP Method: Large (Automatic)   Pulse: 88   SpO2: (!) 93%     There is no height or weight on file to calculate BMI.  Physical Exam  Constitutional:       General: He is not in acute distress.     Appearance: Normal appearance. He is well-developed. He is obese. He is not ill-appearing.   HENT:      Head: Normocephalic and atraumatic.      Nose: Nose normal.   Eyes:      General: No scleral icterus.  Neck:      Thyroid: No thyromegaly.      Vascular: No JVD.      Trachea: No tracheal deviation.   Cardiovascular:      Rate and Rhythm: Normal rate and regular rhythm.      Heart sounds:  Normal heart sounds. No murmur heard.    No friction rub. No gallop.   Pulmonary:      Effort: Pulmonary effort is normal. No respiratory distress.      Breath sounds: Normal breath sounds. No wheezing or rales.   Abdominal:      General: There is no distension.      Palpations: Abdomen is soft. There is no mass.      Tenderness: There is no abdominal tenderness.   Musculoskeletal:         General: No tenderness. Normal range of motion.      Cervical back: Normal range of motion and neck supple.   Lymphadenopathy:      Cervical: No cervical adenopathy.   Skin:     General: Skin is warm and dry.      Findings: No rash.   Neurological:      Mental Status: He is alert and oriented to person, place, and time.      Cranial Nerves: No cranial nerve deficit.      Deep Tendon Reflexes: Reflexes normal.   Psychiatric:         Behavior: Behavior normal.       Lab Results   Component Value Date    WBC 8.50 10/12/2021    HGB 13.7 (L) 10/12/2021    HCT 44.1 10/12/2021     10/12/2021    CHOL 191 10/12/2021    TRIG 86 10/12/2021    HDL 48 10/12/2021    ALT 7 (L) 10/12/2021    AST 12 10/12/2021     01/25/2022    K 4.3 01/25/2022     01/25/2022    CREATININE 0.8 01/25/2022    BUN 11 01/25/2022    CO2 24 01/25/2022    TSH 2.429 01/25/2022    PSA 0.24 08/19/2013    INR 1.0 09/27/2019    GLUF 99 10/12/2016    HGBA1C 5.6 10/12/2021       Assessment:       1. Encounter for annual physical exam    2. Morbid obesity with body mass index of 70 and over in adult    3. Essential hypertension    4. Prediabetes    5. Chronic fatigue    6. Low testosterone    7. Colon cancer screening    8. Erectile dysfunction due to arterial insufficiency    9. BPH with urinary obstruction    10. Simple chronic bronchitis    11. Laboratory examination ordered as part of a routine general medical examination    12. Hyperlipidemia LDL goal <100    13. Xanthelasma of eyelid, bilateral    14. Screening for colon cancer          Plan:     Ke  was seen today for annual exam.    Diagnoses and all orders for this visit:    Encounter for annual physical exam    Morbid obesity with body mass index of 70 and over in adult  Comments:  previously followed by bariatrics, referral placed to reestablish  Orders:  -     Ambulatory referral/consult to Bariatric Medicine; Future    Essential hypertension  Comments:  controlled, no change to current medications  Orders:  -     CBC Auto Differential; Future  -     Comprehensive Metabolic Panel; Future  -     TSH; Future    Prediabetes  Comments:  recheck a1c  Orders:  -     Hemoglobin A1C; Future    Chronic fatigue  Comments:  recheck labs    Low testosterone  Comments:  known history, had been referred to urology previously, will follow up next visit.    Colon cancer screening    Erectile dysfunction due to arterial insufficiency    BPH with urinary obstruction    Simple chronic bronchitis    Laboratory examination ordered as part of a routine general medical examination  -     CBC Auto Differential; Future  -     Comprehensive Metabolic Panel; Future  -     TSH; Future  -     Hemoglobin A1C; Future  -     Lipid Panel; Future    Hyperlipidemia LDL goal <100  Comments:  recheck lipid panel, no change to current medications  Orders:  -     Lipid Panel; Future    Xanthelasma of eyelid, bilateral    Screening for colon cancer  -     Ambulatory referral/consult to Endo Procedure ; Future    20 minutes spent counseling on dietary changes, evaluating patient's dietary habits.     Health maintenance reviewed with patient.     Follow up in about 4 months (around 7/10/2023).    Marcos Zavala MD  Internal Medicine / Primary Care  [facility]  3/10/2023

## 2023-03-13 ENCOUNTER — LAB VISIT (OUTPATIENT)
Dept: LAB | Facility: HOSPITAL | Age: 53
End: 2023-03-13
Attending: INTERNAL MEDICINE
Payer: MEDICARE

## 2023-03-13 DIAGNOSIS — I10 ESSENTIAL HYPERTENSION: ICD-10-CM

## 2023-03-13 DIAGNOSIS — Z00.00 LABORATORY EXAMINATION ORDERED AS PART OF A ROUTINE GENERAL MEDICAL EXAMINATION: ICD-10-CM

## 2023-03-13 DIAGNOSIS — E78.5 HYPERLIPIDEMIA LDL GOAL <100: ICD-10-CM

## 2023-03-13 DIAGNOSIS — R73.03 PREDIABETES: ICD-10-CM

## 2023-03-13 LAB
ALBUMIN SERPL BCP-MCNC: 3.6 G/DL (ref 3.5–5.2)
ALP SERPL-CCNC: 65 U/L (ref 55–135)
ALT SERPL W/O P-5'-P-CCNC: 7 U/L (ref 10–44)
ANION GAP SERPL CALC-SCNC: 6 MMOL/L (ref 8–16)
AST SERPL-CCNC: 9 U/L (ref 10–40)
BASOPHILS # BLD AUTO: 0.03 K/UL (ref 0–0.2)
BASOPHILS NFR BLD: 0.5 % (ref 0–1.9)
BILIRUB SERPL-MCNC: 0.4 MG/DL (ref 0.1–1)
BUN SERPL-MCNC: 13 MG/DL (ref 6–20)
CALCIUM SERPL-MCNC: 9.4 MG/DL (ref 8.7–10.5)
CHLORIDE SERPL-SCNC: 103 MMOL/L (ref 95–110)
CHOLEST SERPL-MCNC: 177 MG/DL (ref 120–199)
CHOLEST/HDLC SERPL: 4.2 {RATIO} (ref 2–5)
CO2 SERPL-SCNC: 29 MMOL/L (ref 23–29)
CREAT SERPL-MCNC: 0.8 MG/DL (ref 0.5–1.4)
DIFFERENTIAL METHOD: ABNORMAL
EOSINOPHIL # BLD AUTO: 0.2 K/UL (ref 0–0.5)
EOSINOPHIL NFR BLD: 2.4 % (ref 0–8)
ERYTHROCYTE [DISTWIDTH] IN BLOOD BY AUTOMATED COUNT: 13.9 % (ref 11.5–14.5)
EST. GFR  (NO RACE VARIABLE): >60 ML/MIN/1.73 M^2
ESTIMATED AVG GLUCOSE: 123 MG/DL (ref 68–131)
GLUCOSE SERPL-MCNC: 104 MG/DL (ref 70–110)
HBA1C MFR BLD: 5.9 % (ref 4–5.6)
HCT VFR BLD AUTO: 44.4 % (ref 40–54)
HDLC SERPL-MCNC: 42 MG/DL (ref 40–75)
HDLC SERPL: 23.7 % (ref 20–50)
HGB BLD-MCNC: 13 G/DL (ref 14–18)
IMM GRANULOCYTES # BLD AUTO: 0.02 K/UL (ref 0–0.04)
IMM GRANULOCYTES NFR BLD AUTO: 0.3 % (ref 0–0.5)
LDLC SERPL CALC-MCNC: 124.4 MG/DL (ref 63–159)
LYMPHOCYTES # BLD AUTO: 1.4 K/UL (ref 1–4.8)
LYMPHOCYTES NFR BLD: 22.6 % (ref 18–48)
MCH RBC QN AUTO: 26.3 PG (ref 27–31)
MCHC RBC AUTO-ENTMCNC: 29.3 G/DL (ref 32–36)
MCV RBC AUTO: 90 FL (ref 82–98)
MONOCYTES # BLD AUTO: 0.8 K/UL (ref 0.3–1)
MONOCYTES NFR BLD: 13.1 % (ref 4–15)
NEUTROPHILS # BLD AUTO: 3.8 K/UL (ref 1.8–7.7)
NEUTROPHILS NFR BLD: 61.1 % (ref 38–73)
NONHDLC SERPL-MCNC: 135 MG/DL
NRBC BLD-RTO: 0 /100 WBC
PLATELET # BLD AUTO: 310 K/UL (ref 150–450)
PMV BLD AUTO: 10.2 FL (ref 9.2–12.9)
POTASSIUM SERPL-SCNC: 4.3 MMOL/L (ref 3.5–5.1)
PROT SERPL-MCNC: 7.7 G/DL (ref 6–8.4)
RBC # BLD AUTO: 4.94 M/UL (ref 4.6–6.2)
SODIUM SERPL-SCNC: 138 MMOL/L (ref 136–145)
TRIGL SERPL-MCNC: 53 MG/DL (ref 30–150)
TSH SERPL DL<=0.005 MIU/L-ACNC: 2.35 UIU/ML (ref 0.4–4)
WBC # BLD AUTO: 6.24 K/UL (ref 3.9–12.7)

## 2023-03-13 PROCEDURE — 85025 COMPLETE CBC W/AUTO DIFF WBC: CPT | Performed by: INTERNAL MEDICINE

## 2023-03-13 PROCEDURE — 83036 HEMOGLOBIN GLYCOSYLATED A1C: CPT | Performed by: INTERNAL MEDICINE

## 2023-03-13 PROCEDURE — 84443 ASSAY THYROID STIM HORMONE: CPT | Performed by: INTERNAL MEDICINE

## 2023-03-13 PROCEDURE — 80053 COMPREHEN METABOLIC PANEL: CPT | Performed by: INTERNAL MEDICINE

## 2023-03-13 PROCEDURE — 80061 LIPID PANEL: CPT | Performed by: INTERNAL MEDICINE

## 2023-03-13 PROCEDURE — 36415 COLL VENOUS BLD VENIPUNCTURE: CPT | Performed by: INTERNAL MEDICINE

## 2023-03-20 ENCOUNTER — TELEPHONE (OUTPATIENT)
Dept: BARIATRICS | Facility: CLINIC | Age: 53
End: 2023-03-20
Payer: MEDICARE

## 2023-04-06 ENCOUNTER — DOCUMENTATION ONLY (OUTPATIENT)
Dept: BARIATRICS | Facility: CLINIC | Age: 53
End: 2023-04-06
Payer: MEDICARE

## 2023-04-13 ENCOUNTER — TELEPHONE (OUTPATIENT)
Dept: INTERNAL MEDICINE | Facility: CLINIC | Age: 53
End: 2023-04-13
Payer: MEDICARE

## 2023-04-13 NOTE — TELEPHONE ENCOUNTER
----- Message from Shabnam Wells sent at 4/13/2023 10:02 AM CDT -----  Contact: [phone]  Pt said he needs a call back about a getting an elevation bed. Please call pt back.

## 2023-04-21 ENCOUNTER — PATIENT MESSAGE (OUTPATIENT)
Dept: SLEEP MEDICINE | Facility: CLINIC | Age: 53
End: 2023-04-21
Payer: MEDICARE

## 2023-04-24 ENCOUNTER — TELEPHONE (OUTPATIENT)
Dept: INTERNAL MEDICINE | Facility: CLINIC | Age: 53
End: 2023-04-24
Payer: MEDICARE

## 2023-04-24 DIAGNOSIS — E66.01 MORBID OBESITY WITH BODY MASS INDEX OF 70 AND OVER IN ADULT: Primary | ICD-10-CM

## 2023-04-24 DIAGNOSIS — R60.0 BILATERAL LOWER EXTREMITY EDEMA: ICD-10-CM

## 2023-04-24 DIAGNOSIS — G47.33 OBSTRUCTIVE SLEEP APNEA ON CPAP: ICD-10-CM

## 2023-04-24 NOTE — TELEPHONE ENCOUNTER
----- Message from Arleth Chatman sent at 4/24/2023  4:29 PM CDT -----  Regarding: Pharmacy called  Name of Who is Calling: pharmacy on behalf of CIRILO JONES [[id number]]            What is the request in detail: Patient pharmacy is requesting a fax with referral for [facility] bed            What Number to Call Back patient: [phone]    Fax for [facility]  [phone]

## 2023-04-25 ENCOUNTER — TELEPHONE (OUTPATIENT)
Dept: SLEEP MEDICINE | Facility: CLINIC | Age: 53
End: 2023-04-25
Payer: MEDICARE

## 2023-04-25 NOTE — TELEPHONE ENCOUNTER
----- Message from Carito Puga sent at 4/24/2023  4:35 PM CDT -----  Regarding: Patient Call Back  .Type: Patient Call Back    Who called: Self     What is the request in detail: Pt is calling to request that the office fax over his referral or prescription for the elevation bed that he needs. Needs to be faxed over to [employer]. Their fax # is [phone] and it is being faxed over to a Ms. Skinner.    Can the clinic reply by [facility]? No     Would the patient rather a call back or a response via [facility]? Call back     Best call back number: [phone]     Additional Information:

## 2023-05-05 DIAGNOSIS — K21.9 GASTROESOPHAGEAL REFLUX DISEASE: ICD-10-CM

## 2023-05-05 DIAGNOSIS — E66.01 MORBID OBESITY: ICD-10-CM

## 2023-05-05 RX ORDER — OMEPRAZOLE 40 MG/1
CAPSULE, DELAYED RELEASE ORAL
Qty: 90 CAPSULE | Refills: 3 | Status: SHIPPED | OUTPATIENT
Start: 2023-05-05 | End: 2024-02-26 | Stop reason: SDUPTHER

## 2023-05-05 NOTE — TELEPHONE ENCOUNTER
No care due was identified.  [facility] Embedded Care Due Messages. Reference number: [id number].   5/05/2023 12:32:45 AM CDT

## 2023-05-05 NOTE — TELEPHONE ENCOUNTER
Refill Routing Note   Medication(s) are not appropriate for processing by [facility] for the following reason(s):      Medication outside of protocol    [facility] action(s):  Approve  Route              Appointments  past 12m or future 3m with PCP    Date Provider   Last Visit   3/10/2023 Marcos Zavala MD   Next Visit   Visit date not found Marcos Zavala MD   ED visits in past 90 days: 0        Note composed:8:59 AM 05/05/2023

## 2023-05-08 RX ORDER — TOPIRAMATE 200 MG/1
TABLET ORAL
Qty: 180 TABLET | Refills: 1 | Status: SHIPPED | OUTPATIENT
Start: 2023-05-08 | End: 2023-07-07

## 2023-07-01 ENCOUNTER — HOSPITAL ENCOUNTER (OUTPATIENT)
Facility: HOSPITAL | Age: 53
Discharge: HOME | End: 2023-07-03
Attending: EMERGENCY MEDICINE | Admitting: EMERGENCY MEDICINE
Payer: MEDICARE

## 2023-07-01 DIAGNOSIS — R06.02 SHORTNESS OF BREATH: ICD-10-CM

## 2023-07-01 DIAGNOSIS — R07.9 CHEST PAIN: ICD-10-CM

## 2023-07-01 DIAGNOSIS — R10.13 EPIGASTRIC ABDOMINAL PAIN: ICD-10-CM

## 2023-07-01 DIAGNOSIS — K80.50 CHOLEDOCHOLITHIASIS: ICD-10-CM

## 2023-07-01 DIAGNOSIS — E87.70 HYPERVOLEMIA: ICD-10-CM

## 2023-07-01 DIAGNOSIS — K80.50 BILIARY COLIC: ICD-10-CM

## 2023-07-01 DIAGNOSIS — J41.0 CHRONIC BRONCHITIS, SIMPLE: ICD-10-CM

## 2023-07-01 DIAGNOSIS — K85.10 GALLSTONE PANCREATITIS: Primary | ICD-10-CM

## 2023-07-01 PROBLEM — N40.1 BPH WITH URINARY OBSTRUCTION: Status: RESOLVED | Noted: 2022-02-17 | Resolved: 2023-07-01

## 2023-07-01 PROBLEM — R06.09 DOE (DYSPNEA ON EXERTION): Status: ACTIVE | Noted: 2019-09-27

## 2023-07-01 PROBLEM — N13.8 BPH WITH URINARY OBSTRUCTION: Status: RESOLVED | Noted: 2022-02-17 | Resolved: 2023-07-01

## 2023-07-01 LAB
ALBUMIN SERPL BCP-MCNC: 3.5 G/DL (ref 3.5–5.2)
ALP SERPL-CCNC: 110 U/L (ref 55–135)
ALT SERPL W/O P-5'-P-CCNC: 108 U/L (ref 10–44)
ANION GAP SERPL CALC-SCNC: 9 MMOL/L (ref 8–16)
ASCENDING AORTA: 3.23 CM
AST SERPL-CCNC: 176 U/L (ref 10–40)
AV INDEX (PROSTH): 0.89
AV MEAN GRADIENT: 5 MMHG
AV PEAK GRADIENT: 8 MMHG
AV VALVE AREA: 2.79 CM2
AV VELOCITY RATIO: 0.73
BASOPHILS # BLD AUTO: 0.02 K/UL (ref 0–0.2)
BASOPHILS NFR BLD: 0.2 % (ref 0–1.9)
BILIRUB SERPL-MCNC: 0.8 MG/DL (ref 0.1–1)
BNP SERPL-MCNC: 13 PG/ML (ref 0–99)
BUN SERPL-MCNC: 15 MG/DL (ref 6–20)
CALCIUM SERPL-MCNC: 9.7 MG/DL (ref 8.7–10.5)
CHLORIDE SERPL-SCNC: 102 MMOL/L (ref 95–110)
CO2 SERPL-SCNC: 25 MMOL/L (ref 23–29)
CREAT SERPL-MCNC: 0.9 MG/DL (ref 0.5–1.4)
CV ECHO LV RWT: 0.33 CM
DIFFERENTIAL METHOD: ABNORMAL
DOP CALC AO PEAK VEL: 1.42 M/S
DOP CALC AO VTI: 26.91 CM
DOP CALC LVOT AREA: 3.1 CM2
DOP CALC LVOT DIAMETER: 2 CM
DOP CALC LVOT PEAK VEL: 1.04 M/S
DOP CALC LVOT STROKE VOLUME: 75.05 CM3
DOP CALCLVOT PEAK VEL VTI: 23.9 CM
E WAVE DECELERATION TIME: 272.5 MSEC
E/A RATIO: 1.72
E/E' RATIO: 6.89 M/S
ECHO LV POSTERIOR WALL: 0.97 CM (ref 0.6–1.1)
EJECTION FRACTION: 60 %
EOSINOPHIL # BLD AUTO: 0 K/UL (ref 0–0.5)
EOSINOPHIL NFR BLD: 0.2 % (ref 0–8)
ERYTHROCYTE [DISTWIDTH] IN BLOOD BY AUTOMATED COUNT: 14.6 % (ref 11.5–14.5)
EST. GFR  (NO RACE VARIABLE): >60 ML/MIN/1.73 M^2
ESTIMATED AVG GLUCOSE: 117 MG/DL (ref 68–131)
FRACTIONAL SHORTENING: 41 % (ref 28–44)
GLUCOSE SERPL-MCNC: 94 MG/DL (ref 70–110)
HBA1C MFR BLD: 5.7 % (ref 4–5.6)
HCT VFR BLD AUTO: 45.8 % (ref 40–54)
HCV AB SERPL QL IA: NORMAL
HGB BLD-MCNC: 14 G/DL (ref 14–18)
HIV 1+2 AB+HIV1 P24 AG SERPL QL IA: NORMAL
IMM GRANULOCYTES # BLD AUTO: 0.02 K/UL (ref 0–0.04)
IMM GRANULOCYTES NFR BLD AUTO: 0.2 % (ref 0–0.5)
INTERVENTRICULAR SEPTUM: 1.02 CM (ref 0.6–1.1)
LA MAJOR: 5.92 CM
LA MINOR: 5.52 CM
LA WIDTH: 4.07 CM
LEFT ATRIUM SIZE: 4.91 CM
LEFT ATRIUM VOLUME: 97.04 CM3
LEFT INTERNAL DIMENSION IN SYSTOLE: 3.5 CM (ref 2.1–4)
LEFT VENTRICLE DIASTOLIC VOLUME: 173.34 ML
LEFT VENTRICLE SYSTOLIC VOLUME: 51.04 ML
LEFT VENTRICULAR INTERNAL DIMENSION IN DIASTOLE: 5.9 CM (ref 3.5–6)
LEFT VENTRICULAR MASS: 238.38 G
LIPASE SERPL-CCNC: 234 U/L (ref 4–60)
LV LATERAL E/E' RATIO: 6.2 M/S
LV SEPTAL E/E' RATIO: 7.75 M/S
LYMPHOCYTES # BLD AUTO: 0.8 K/UL (ref 1–4.8)
LYMPHOCYTES NFR BLD: 9.2 % (ref 18–48)
MCH RBC QN AUTO: 26.8 PG (ref 27–31)
MCHC RBC AUTO-ENTMCNC: 30.6 G/DL (ref 32–36)
MCV RBC AUTO: 88 FL (ref 82–98)
MONOCYTES # BLD AUTO: 0.9 K/UL (ref 0.3–1)
MONOCYTES NFR BLD: 10.5 % (ref 4–15)
MV PEAK A VEL: 0.54 M/S
MV PEAK E VEL: 0.93 M/S
MV STENOSIS PRESSURE HALF TIME: 79.02 MS
MV VALVE AREA P 1/2 METHOD: 2.78 CM2
NEUTROPHILS # BLD AUTO: 6.9 K/UL (ref 1.8–7.7)
NEUTROPHILS NFR BLD: 79.7 % (ref 38–73)
NRBC BLD-RTO: 0 /100 WBC
PLATELET # BLD AUTO: 291 K/UL (ref 150–450)
PMV BLD AUTO: 9.7 FL (ref 9.2–12.9)
POC CARDIAC TROPONIN I: 0 NG/ML (ref 0–0.08)
POC CARDIAC TROPONIN I: 0 NG/ML (ref 0–0.08)
POCT GLUCOSE: 96 MG/DL (ref 70–110)
POTASSIUM SERPL-SCNC: 4.7 MMOL/L (ref 3.5–5.1)
PROT SERPL-MCNC: 7.9 G/DL (ref 6–8.4)
RA MAJOR: 6.1 CM
RA PRESSURE: 8 MMHG
RA WIDTH: 3.9 CM
RBC # BLD AUTO: 5.22 M/UL (ref 4.6–6.2)
RIGHT VENTRICULAR END-DIASTOLIC DIMENSION: 3.57 CM
SAMPLE: NORMAL
SAMPLE: NORMAL
SINUS: 3.42 CM
SODIUM SERPL-SCNC: 136 MMOL/L (ref 136–145)
STJ: 3.13 CM
TDI LATERAL: 0.15 M/S
TDI SEPTAL: 0.12 M/S
TDI: 0.14 M/S
TROPONIN I SERPL DL<=0.01 NG/ML-MCNC: <0.006 NG/ML (ref 0–0.03)
TROPONIN I SERPL DL<=0.01 NG/ML-MCNC: <0.006 NG/ML (ref 0–0.03)
WBC # BLD AUTO: 8.61 K/UL (ref 3.9–12.7)

## 2023-07-01 PROCEDURE — 87389 HIV-1 AG W/HIV-1&-2 AB AG IA: CPT | Performed by: EMERGENCY MEDICINE

## 2023-07-01 PROCEDURE — 99223 1ST HOSP IP/OBS HIGH 75: CPT | Mod: ,,,

## 2023-07-01 PROCEDURE — 25000003 PHARM REV CODE 250: Performed by: INTERNAL MEDICINE

## 2023-07-01 PROCEDURE — 93010 ELECTROCARDIOGRAM REPORT: CPT | Mod: ,,, | Performed by: INTERNAL MEDICINE

## 2023-07-01 PROCEDURE — 83880 ASSAY OF NATRIURETIC PEPTIDE: CPT | Performed by: STUDENT IN AN ORGANIZED HEALTH CARE EDUCATION/TRAINING PROGRAM

## 2023-07-01 PROCEDURE — 85025 COMPLETE CBC W/AUTO DIFF WBC: CPT | Performed by: STUDENT IN AN ORGANIZED HEALTH CARE EDUCATION/TRAINING PROGRAM

## 2023-07-01 PROCEDURE — G0378 HOSPITAL OBSERVATION PER HR: HCPCS

## 2023-07-01 PROCEDURE — 96365 THER/PROPH/DIAG IV INF INIT: CPT | Mod: 59

## 2023-07-01 PROCEDURE — 86803 HEPATITIS C AB TEST: CPT | Performed by: EMERGENCY MEDICINE

## 2023-07-01 PROCEDURE — 25500020 PHARM REV CODE 255: Performed by: INTERNAL MEDICINE

## 2023-07-01 PROCEDURE — 96372 THER/PROPH/DIAG INJ SC/IM: CPT | Mod: 59

## 2023-07-01 PROCEDURE — 99204 PR OFFICE/OUTPT VISIT, NEW, LEVL IV, 45-59 MIN: ICD-10-PCS | Mod: GC,,, | Performed by: INTERNAL MEDICINE

## 2023-07-01 PROCEDURE — 83036 HEMOGLOBIN GLYCOSYLATED A1C: CPT | Performed by: STUDENT IN AN ORGANIZED HEALTH CARE EDUCATION/TRAINING PROGRAM

## 2023-07-01 PROCEDURE — 63600175 PHARM REV CODE 636 W HCPCS

## 2023-07-01 PROCEDURE — 93010 EKG 12-LEAD: ICD-10-PCS | Mod: ,,, | Performed by: INTERNAL MEDICINE

## 2023-07-01 PROCEDURE — 99223 PR INITIAL HOSPITAL CARE,LEVL III: ICD-10-PCS | Mod: ,,,

## 2023-07-01 PROCEDURE — 83690 ASSAY OF LIPASE: CPT | Performed by: STUDENT IN AN ORGANIZED HEALTH CARE EDUCATION/TRAINING PROGRAM

## 2023-07-01 PROCEDURE — 96366 THER/PROPH/DIAG IV INF ADDON: CPT

## 2023-07-01 PROCEDURE — 25000003 PHARM REV CODE 250

## 2023-07-01 PROCEDURE — 80053 COMPREHEN METABOLIC PANEL: CPT | Performed by: STUDENT IN AN ORGANIZED HEALTH CARE EDUCATION/TRAINING PROGRAM

## 2023-07-01 PROCEDURE — 96372 THER/PROPH/DIAG INJ SC/IM: CPT

## 2023-07-01 PROCEDURE — 99204 OFFICE O/P NEW MOD 45 MIN: CPT | Mod: GC,,, | Performed by: INTERNAL MEDICINE

## 2023-07-01 PROCEDURE — 84484 ASSAY OF TROPONIN QUANT: CPT | Mod: 91 | Performed by: STUDENT IN AN ORGANIZED HEALTH CARE EDUCATION/TRAINING PROGRAM

## 2023-07-01 PROCEDURE — 25000003 PHARM REV CODE 250: Performed by: STUDENT IN AN ORGANIZED HEALTH CARE EDUCATION/TRAINING PROGRAM

## 2023-07-01 PROCEDURE — 94761 N-INVAS EAR/PLS OXIMETRY MLT: CPT

## 2023-07-01 PROCEDURE — 93005 ELECTROCARDIOGRAM TRACING: CPT

## 2023-07-01 PROCEDURE — 99285 EMERGENCY DEPT VISIT HI MDM: CPT | Mod: 25

## 2023-07-01 RX ORDER — HYDRALAZINE HYDROCHLORIDE 25 MG/1
25 TABLET, FILM COATED ORAL EVERY 8 HOURS PRN
Status: DISCONTINUED | OUTPATIENT
Start: 2023-07-01 | End: 2023-07-03 | Stop reason: HOSPADM

## 2023-07-01 RX ORDER — MAG HYDROX/ALUMINUM HYD/SIMETH 200-200-20
30 SUSPENSION, ORAL (FINAL DOSE FORM) ORAL 4 TIMES DAILY PRN
Status: DISCONTINUED | OUTPATIENT
Start: 2023-07-01 | End: 2023-07-03 | Stop reason: HOSPADM

## 2023-07-01 RX ORDER — OXYCODONE AND ACETAMINOPHEN 5; 325 MG/1; MG/1
1 TABLET ORAL EVERY 6 HOURS PRN
Status: DISCONTINUED | OUTPATIENT
Start: 2023-07-01 | End: 2023-07-01

## 2023-07-01 RX ORDER — SIMETHICONE 80 MG
1 TABLET,CHEWABLE ORAL 4 TIMES DAILY PRN
Status: DISCONTINUED | OUTPATIENT
Start: 2023-07-01 | End: 2023-07-03 | Stop reason: HOSPADM

## 2023-07-01 RX ORDER — DEXTROSE 40 %
24 GEL (GRAM) ORAL
Status: DISCONTINUED | OUTPATIENT
Start: 2023-07-01 | End: 2023-07-03 | Stop reason: HOSPADM

## 2023-07-01 RX ORDER — SODIUM CHLORIDE 0.9 % (FLUSH) 0.9 %
10 SYRINGE (ML) INJECTION EVERY 12 HOURS PRN
Status: DISCONTINUED | OUTPATIENT
Start: 2023-07-01 | End: 2023-07-03 | Stop reason: HOSPADM

## 2023-07-01 RX ORDER — INSULIN ASPART 100 [IU]/ML
0-5 INJECTION, SOLUTION INTRAVENOUS; SUBCUTANEOUS
Status: DISCONTINUED | OUTPATIENT
Start: 2023-07-01 | End: 2023-07-01

## 2023-07-01 RX ORDER — IBUPROFEN 200 MG
24 TABLET ORAL
Status: DISCONTINUED | OUTPATIENT
Start: 2023-07-01 | End: 2023-07-01

## 2023-07-01 RX ORDER — DEXTROSE 40 %
16 GEL (GRAM) ORAL
Status: DISCONTINUED | OUTPATIENT
Start: 2023-07-01 | End: 2023-07-03 | Stop reason: HOSPADM

## 2023-07-01 RX ORDER — FLUTICASONE FUROATE AND VILANTEROL 100; 25 UG/1; UG/1
1 POWDER RESPIRATORY (INHALATION) DAILY
Status: DISCONTINUED | OUTPATIENT
Start: 2023-07-01 | End: 2023-07-03 | Stop reason: HOSPADM

## 2023-07-01 RX ORDER — POLYETHYLENE GLYCOL 3350 17 G/17G
17 POWDER, FOR SOLUTION ORAL 2 TIMES DAILY PRN
Status: DISCONTINUED | OUTPATIENT
Start: 2023-07-01 | End: 2023-07-03 | Stop reason: HOSPADM

## 2023-07-01 RX ORDER — GLUCAGON 1 MG
1 KIT INJECTION
Status: DISCONTINUED | OUTPATIENT
Start: 2023-07-01 | End: 2023-07-03 | Stop reason: HOSPADM

## 2023-07-01 RX ORDER — ONDANSETRON 8 MG/1
8 TABLET, ORALLY DISINTEGRATING ORAL EVERY 8 HOURS PRN
Status: DISCONTINUED | OUTPATIENT
Start: 2023-07-01 | End: 2023-07-03 | Stop reason: HOSPADM

## 2023-07-01 RX ORDER — NALOXONE HCL 0.4 MG/ML
0.02 VIAL (ML) INJECTION
Status: DISCONTINUED | OUTPATIENT
Start: 2023-07-01 | End: 2023-07-03 | Stop reason: HOSPADM

## 2023-07-01 RX ORDER — OXYCODONE AND ACETAMINOPHEN 10; 325 MG/1; MG/1
1 TABLET ORAL EVERY 4 HOURS PRN
Status: DISCONTINUED | OUTPATIENT
Start: 2023-07-01 | End: 2023-07-01

## 2023-07-01 RX ORDER — SUCRALFATE 1 G/10ML
1 SUSPENSION ORAL EVERY 6 HOURS
Status: DISCONTINUED | OUTPATIENT
Start: 2023-07-01 | End: 2023-07-03 | Stop reason: HOSPADM

## 2023-07-01 RX ORDER — TOPIRAMATE 100 MG/1
200 TABLET, FILM COATED ORAL 2 TIMES DAILY
Status: DISCONTINUED | OUTPATIENT
Start: 2023-07-01 | End: 2023-07-01

## 2023-07-01 RX ORDER — IBUPROFEN 200 MG
16 TABLET ORAL
Status: DISCONTINUED | OUTPATIENT
Start: 2023-07-01 | End: 2023-07-01

## 2023-07-01 RX ORDER — LISINOPRIL 2.5 MG/1
2.5 TABLET ORAL DAILY
Status: DISCONTINUED | OUTPATIENT
Start: 2023-07-01 | End: 2023-07-03 | Stop reason: HOSPADM

## 2023-07-01 RX ORDER — ACETAMINOPHEN 325 MG/1
325 TABLET ORAL EVERY 8 HOURS PRN
Status: DISCONTINUED | OUTPATIENT
Start: 2023-07-01 | End: 2023-07-03 | Stop reason: HOSPADM

## 2023-07-01 RX ORDER — MAG HYDROX/ALUMINUM HYD/SIMETH 200-200-20
30 SUSPENSION, ORAL (FINAL DOSE FORM) ORAL
Status: DISCONTINUED | OUTPATIENT
Start: 2023-07-01 | End: 2023-07-03 | Stop reason: HOSPADM

## 2023-07-01 RX ORDER — TALC
6 POWDER (GRAM) TOPICAL NIGHTLY PRN
Status: DISCONTINUED | OUTPATIENT
Start: 2023-07-01 | End: 2023-07-03 | Stop reason: HOSPADM

## 2023-07-01 RX ORDER — MAG HYDROX/ALUMINUM HYD/SIMETH 200-200-20
15 SUSPENSION, ORAL (FINAL DOSE FORM) ORAL
Status: COMPLETED | OUTPATIENT
Start: 2023-07-01 | End: 2023-07-01

## 2023-07-01 RX ORDER — BISACODYL 10 MG
10 SUPPOSITORY, RECTAL RECTAL DAILY PRN
Status: DISCONTINUED | OUTPATIENT
Start: 2023-07-01 | End: 2023-07-03 | Stop reason: HOSPADM

## 2023-07-01 RX ORDER — HEPARIN SODIUM 5000 [USP'U]/ML
7500 INJECTION, SOLUTION INTRAVENOUS; SUBCUTANEOUS EVERY 8 HOURS
Status: DISCONTINUED | OUTPATIENT
Start: 2023-07-01 | End: 2023-07-03 | Stop reason: HOSPADM

## 2023-07-01 RX ORDER — ACETAMINOPHEN 325 MG/1
650 TABLET ORAL EVERY 4 HOURS PRN
Status: DISCONTINUED | OUTPATIENT
Start: 2023-07-01 | End: 2023-07-01

## 2023-07-01 RX ORDER — PANTOPRAZOLE SODIUM 40 MG/1
40 TABLET, DELAYED RELEASE ORAL DAILY
Status: DISCONTINUED | OUTPATIENT
Start: 2023-07-01 | End: 2023-07-03 | Stop reason: HOSPADM

## 2023-07-01 RX ORDER — PROCHLORPERAZINE EDISYLATE 5 MG/ML
5 INJECTION INTRAMUSCULAR; INTRAVENOUS EVERY 6 HOURS PRN
Status: DISCONTINUED | OUTPATIENT
Start: 2023-07-01 | End: 2023-07-03 | Stop reason: HOSPADM

## 2023-07-01 RX ORDER — SODIUM CHLORIDE 9 MG/ML
INJECTION, SOLUTION INTRAVENOUS CONTINUOUS
Status: ACTIVE | OUTPATIENT
Start: 2023-07-01 | End: 2023-07-02

## 2023-07-01 RX ORDER — FUROSEMIDE 40 MG/1
40 TABLET ORAL 2 TIMES DAILY
Status: DISCONTINUED | OUTPATIENT
Start: 2023-07-01 | End: 2023-07-03 | Stop reason: HOSPADM

## 2023-07-01 RX ADMIN — ALUMINUM HYDROXIDE, MAGNESIUM HYDROXIDE, AND SIMETHICONE 30 ML: 200; 200; 20 SUSPENSION ORAL at 09:07

## 2023-07-01 RX ADMIN — HUMAN ALBUMIN MICROSPHERES AND PERFLUTREN 0.66 MG: 10; .22 INJECTION, SOLUTION INTRAVENOUS at 12:07

## 2023-07-01 RX ADMIN — HEPARIN SODIUM 7500 UNITS: 5000 INJECTION INTRAVENOUS; SUBCUTANEOUS at 02:07

## 2023-07-01 RX ADMIN — LISINOPRIL 2.5 MG: 2.5 TABLET ORAL at 10:07

## 2023-07-01 RX ADMIN — HEPARIN SODIUM 7500 UNITS: 5000 INJECTION INTRAVENOUS; SUBCUTANEOUS at 09:07

## 2023-07-01 RX ADMIN — FUROSEMIDE 40 MG: 40 TABLET ORAL at 10:07

## 2023-07-01 RX ADMIN — ALUMINUM HYDROXIDE, MAGNESIUM HYDROXIDE, AND SIMETHICONE 30 ML: 200; 200; 20 SUSPENSION ORAL at 10:07

## 2023-07-01 RX ADMIN — SODIUM CHLORIDE: 9 INJECTION, SOLUTION INTRAVENOUS at 12:07

## 2023-07-01 RX ADMIN — PANTOPRAZOLE SODIUM 40 MG: 40 TABLET, DELAYED RELEASE ORAL at 10:07

## 2023-07-01 RX ADMIN — SUCRALFATE 1 G: 1 SUSPENSION ORAL at 12:07

## 2023-07-01 RX ADMIN — ALUMINUM HYDROXIDE, MAGNESIUM HYDROXIDE, AND SIMETHICONE 15 ML: 200; 200; 20 SUSPENSION ORAL at 04:07

## 2023-07-01 RX ADMIN — SODIUM CHLORIDE: 9 INJECTION, SOLUTION INTRAVENOUS at 09:07

## 2023-07-01 NOTE — ASSESSMENT & PLAN NOTE
- Pt presenting with acute, severe epigastric pain as described above  - Afebrile with no leukocytosis.   - T ronda 0.8. . . Lipase 234.   - U/S consistent with cholelithiasis with mild wall thickening and mildly dilated CBD measuring 7mm.   - AES consulted, appreciate recs:    -IVF with LR, pain control PRN, and NPO with advancement of diet to clears if reporting hunger.    -Obtain MRCP to evaluate for retained stone in bile ducts.     - MRCP unavailable, discussed with AES & CTAP with IV contrast ordered   -CMP and INR daily.    -Cholecystectomy prior to discharge.   - General surgery consulted, appreciate recs

## 2023-07-01 NOTE — HPI
Mr. Ke Oliver is a 53 year old male for whom AES is consulted for management of gallstone pancreatitis. He has a PMH significant HTN, obesity (on OZEMPIC), and JUDSON.     Patient reports onset of epigastric pain on 06/30 upon laying down for bed. He reports symptom association with shortness of breath, but denies symptom association with nausea, vomiting, fevers, chills, or skin/eye yellowing. Due to symptoms, he presented to ED here on 07/01.     Hospital Course:   On arrival, vital signs notable for hypertension ('s). Labs notable for normal WBC, normal Hgb, transaminitis (, ), and elevated lipase (234). Abdominal US showed cholelithiasis with a large gallstone measuring up to 5.3 cm and a mild dilated common bile duct without intra-hepatic ductal dilatation. He was admitted to hospital medicine and AES consulted.

## 2023-07-01 NOTE — H&P
"[facility] - Emergency Dept  [facility] Medicine  History & Physical    Patient Name: Ke Oliver  MRN: [id number]  Patient Class: OP- Observation  Admission Date: 7/1/2023  Attending Physician: Randall Aviles MD   Primary Care Provider: Marcos Zavala MD         Patient information was obtained from patient, past medical records and ER records.     Subjective:     Principal Problem:Acute gallstone pancreatitis    Chief Complaint:   Chief Complaint   Patient presents with    Abdominal Pain     Epigastric "pulling" pain x 1 hour. Noticed when he laid down for bed. Associated shortness of breath d/t pain. Denies fever, chest pain, nausea, vomiting.         HPI: Ke Oliver is a 53 y.o. M with HTN, prediabetes, GERD, chronic bronchitis, morbid obesity, and JUDSON who presented to the ED with acute epigastric abdominal pain, which started abruptly at 2300 last night. He reports he was laying back to sleep when he felt a sudden, tearing & pulling epigastric abdominal pain, 11/10 in severity, which radiated to his umbilicus and back. He reports the pain was constant and uncontrolled for roughly 2 hrs and improved to 2/10 without intervention prior to evaluation in the ED. He denies associated fever, chills, CP, SOB, N/V, diarrhea or constipation. Last BM yesterday afternoon and well formed. He reports chronic history of lower extremity edema, SOB, STALLINGS, and three pillow orthopnea over the last three years, which has progressively worsened since that time. He denies exertional chest pain but reports he takes roughly 10 steps before needing to stop for a break to catch his breath. Pt denies palpitations, cough, dysuria, leg pain, weakness, confusion, HA, syncope or recent sick contacts.       In the ED: Hypertensive to 170/87, RR 22, otherwise VSS. Afebrile with no leukocytosis. T ronda 0.8. . . Lipase 234. BNP 13. Troponin x2 wnl. CXR consistent with pulmonary edema. U/S consistent with cholelithiasis with " mild wall thickening and mildly dilated CBD measuring 7mm. AES was consulted and the patient was placed in observation for further management.       Past Medical History:   Diagnosis Date    Bilateral primary osteoarthritis of knee     Hypertension     Obesity     Sleep apnea        Past Surgical History:   Procedure Laterality Date    ANKLE SURGERY  1978    ankle fracture pinnned    COLONOSCOPY N/A 8/4/2016    COLONOSCOPY;  Surgeon: Carson Mittal MD    HERNIA REPAIR  1999    herniated testicle       Review of patient's allergies indicates:  No Known Allergies    No current facility-administered medications on file prior to encounter.     Current Outpatient Medications on File Prior to Encounter   Medication Sig    acetaminophen (TYLENOL) 325 MG tablet Take 2 tablets (650 mg total) by mouth every 6 (six) hours as needed (arthritis pain).    albuterol (ACCUNEB) 0.63 mg/3 mL Nebu Take 3 mLs (0.63 mg total) by nebulization every 6 (six) hours as needed. Rescue    BREO ELLIPTA 100-25 mcg/dose diskus inhaler INHALE 1 PUFF INTO THE LUNGS ONCE DAILY    furosemide (LASIX) 40 MG tablet TAKE 1 TABLET BY MOUTH TWICE A DAY    JARDIANCE 10 mg tablet TAKE 1 TABLET BY MOUTH EVERY DAY    ketoconazole (NIZORAL) 2 % shampoo APPLY TOPICALLY TWICE A WEEK. APPLY TO SCALP, LATHER, LET SIT 2-5 MINUTES THEN RINSE.    lisinopriL (PRINIVIL,ZESTRIL) 2.5 MG tablet TAKE 1 TABLET BY MOUTH EVERY DAY    nystatin (MYCOSTATIN) powder Apply topically 2 (two) times daily.    omeprazole (PRILOSEC) 40 MG capsule TAKE 1 CAPSULE BY MOUTH EVERY DAY    potassium chloride (MICRO-K) 10 MEQ CpSR TAKE 1 CAPSULE BY MOUTH ONCE DAILY. WHEN TAKING FUROSEMIDE(LASIX) FOR 60 DOSES    tadalafiL (CIALIS) 20 MG Tab Take 1 tablet (20 mg total) by mouth once daily. Take 1 hour before intercourse    topiramate (TOPAMAX) 200 MG Tab TAKE 1 TABLET BY MOUTH TWICE A DAY     Family History       Problem Relation (Age of Onset)    Cancer Mother           Tobacco Use    Smoking status: Former     Packs/day: 1.00     Years: 17.00     Pack years: 17.00     Types: Cigarettes     Quit date: 2001     Years since quittin.8    Smokeless tobacco: Never   Substance and Sexual Activity    Alcohol use: Yes     Comment: 3 every other week     Drug use: No    Sexual activity: Not Currently     Review of Systems   Constitutional:  Positive for activity change. Negative for chills and fever.   HENT:  Negative for trouble swallowing.    Eyes:  Negative for photophobia and visual disturbance.   Respiratory:  Positive for shortness of breath. Negative for cough, chest tightness and wheezing.    Cardiovascular:  Positive for leg swelling. Negative for chest pain and palpitations.   Gastrointestinal:  Positive for abdominal pain. Negative for constipation, diarrhea, nausea and vomiting.   Genitourinary:  Negative for dysuria, frequency, hematuria and urgency.   Musculoskeletal:  Positive for back pain. Negative for arthralgias and gait problem.   Skin:  Negative for color change and rash.   Neurological:  Negative for dizziness, syncope, weakness, light-headedness, numbness and headaches.   Psychiatric/Behavioral:  Negative for agitation and confusion. The patient is not nervous/anxious.    Objective:     Vital Signs (Most Recent):  Temp: 98.2 °F (36.8 °C) (23)  Pulse: 70 (23)  Resp: 19 (23)  BP: (!) 151/77 (23)  SpO2: 98 % (23) Vital Signs (24h Range):  Temp:  [98 °F (36.7 °C)-98.2 °F (36.8 °C)] 98.2 °F (36.8 °C)  Pulse:  [70-89] 70  Resp:  [16-22] 19  SpO2:  [93 %-98 %] 98 %  BP: (151-170)/(75-87) 151/77        There is no height or weight on file to calculate BMI.     Physical Exam  Vitals and nursing note reviewed.   Constitutional:       General: He is not in acute distress.     Appearance: He is well-developed. He is obese. He is not ill-appearing.   HENT:      Head: Normocephalic and atraumatic.       Mouth/Throat:      Mouth: Mucous membranes are dry.   Eyes:      General: No scleral icterus.     Conjunctiva/sclera: Conjunctivae normal.      Pupils: Pupils are equal, round, and reactive to light.   Cardiovascular:      Rate and Rhythm: Normal rate and regular rhythm.      Heart sounds: Normal heart sounds.   Pulmonary:      Effort: Pulmonary effort is normal. No respiratory distress.      Comments: Diminished breath sounds bilaterally 2/2 body habitus  Abdominal:      General: Bowel sounds are normal. There is no distension.      Palpations: Abdomen is soft.      Tenderness: There is abdominal tenderness in the epigastric area. There is no guarding or rebound.   Musculoskeletal:         General: No tenderness. Normal range of motion.      Cervical back: Normal range of motion and neck supple.      Right lower leg: Edema present.      Left lower leg: Edema present.   Skin:     General: Skin is warm and dry.      Capillary Refill: Capillary refill takes less than 2 seconds.   Neurological:      Mental Status: He is alert and oriented to person, place, and time.      Cranial Nerves: No cranial nerve deficit.      Sensory: No sensory deficit.   Psychiatric:         Behavior: Behavior normal.         Thought Content: Thought content normal.         Judgment: Judgment normal.            CRANIAL NERVES     CN III, IV, VI   Pupils are equal, round, and reactive to light.     Significant Labs: All pertinent labs within the past 24 hours have been reviewed.  CBC:   Recent Labs   Lab 07/01/23 0407   WBC 8.61   HGB 14.0   HCT 45.8        CMP:   Recent Labs   Lab 07/01/23  0407      K 4.7      CO2 25   GLU 94   BUN 15   CREATININE 0.9   CALCIUM 9.7   PROT 7.9   ALBUMIN 3.5   BILITOT 0.8   ALKPHOS 110   *   *   ANIONGAP 9     Lipase:   Recent Labs   Lab 07/01/23  0407   LIPASE 234*       Significant Imaging: I have reviewed all pertinent imaging results/findings within the past 24  hours.    US Abdomen Limited  Narrative: EXAMINATION:  US ABDOMEN LIMITED    CLINICAL HISTORY:  r/o cholecystitis;.    TECHNIQUE:  Limited ultrasound of the right upper quadrant of the abdomen including pancreas, gallbladder, common bile duct was performed to evaluate for cholecystitis.    COMPARISON:  None    FINDINGS:  Gallbladder: Large gallstone measuring up to 5.3 cm.  Mild gallbladder wall thickening measuring 5 mm.  No pericholecystic fluid.  No wall hyperemia. No sonographic Cardoza's sign.    Biliary system: The common duct is mildly dilated, measuring 7 mm.  No intrahepatic ductal dilatation.    Pancreas: The visualized portions of pancreas appear normal.  Impression: Cholelithiasis with mild wall thickening without peripheral hypervascularity.  Cholecystitis is felt unlikely.  If clinical concern remains, can obtain HIDA scan.    Mildly dilated common bile duct measuring 7 mm.  Further evaluation with MRCP.    This report was flagged in Epic as abnormal.    Electronically signed by resident: Dajuan Ramos  Date:    07/01/2023  Time:    06:19    Electronically signed by: Shaji Albarran  Date:    07/01/2023  Time:    06:41        Assessment/Plan:     * Acute gallstone pancreatitis  - Pt presenting with acute, severe epigastric pain as described above  - Afebrile with no leukocytosis.   - T ronda 0.8. . . Lipase 234.   - U/S consistent with cholelithiasis with mild wall thickening and mildly dilated CBD measuring 7mm.   - AES consulted, appreciate recs:    -IVF with LR, pain control PRN, and NPO with advancement of diet to clears if reporting hunger.    -Obtain MRCP to evaluate for retained stone in bile ducts.     - MRCP unavailable, discussed with AES & CTAP with IV contrast ordered   -CMP and INR daily.    -Cholecystectomy prior to discharge.   - General surgery consulted, appreciate recs    Simple chronic bronchitis  - Chronic issue  - On Breo and PRN albuterol at home, continue inpatient      STALLINGS (dyspnea on exertion)  - Pt with progressively worsening SOB, STALLINGS, orthopnea, and BLLE edema over years  - Last echo 3 years prior without abnormalities   - Troponin x2 wnl. BNP 13 but unreliable given morbid obesity.   - Echo pending   - Continue home lasix     Prediabetes  - Controlled/uncontrolled  Lab Results   Component Value Date    HGBA1C 5.7 (H) 07/01/2023   - Hold home jardiance while inpatient     Morbid obesity with body mass index of 70 and over in adult  Body mass index is 99.31 kg/m². Morbid obesity complicates all aspects of disease management from diagnostic modalities to treatment. Weight loss encouraged and health benefits explained to patient.  - Has tried ozempic & topamax in the past with no improvement  - Consider amb ref to bariatric surgery at discharge     Essential hypertension  - Elevated on admission   - Latest BP and vitals reviewed  - Continue home meds for HTN: lisinopril 2.5 mg, lasix 40 mg BID  - Goal SBP 120s-140s. Utilize p.r.n. antihypertensives only if patient's BP >180/110 & develop symptoms of worsening CP or SOB.    Obstructive sleep apnea on CPAP  - CPAP qhs & with daytime naps    VTE Risk Mitigation (From admission, onward)         Ordered     heparin (porcine) injection 7,500 Units  Every 8 hours         07/01/23 0809     IP VTE HIGH RISK PATIENT  Once         07/01/23 0809     Place sequential compression device  Until discontinued         07/01/23 0809                     On 07/01/2023, patient should be placed in hospital observation services under my care in collaboration with Randall Aviles MD.      HENNY MccloudC  Department of Hospital Medicine  [facility] Dept

## 2023-07-01 NOTE — ED NOTES
Pt placed on EDOU cardiac monitor w/ continuous pulse ox. Registered in central ED cardiac tele monitoring system.

## 2023-07-01 NOTE — ASSESSMENT & PLAN NOTE
- Pt with progressively worsening SOB, STALLINGS, orthopnea, and BLLE edema over years  - Last echo 3 years prior without abnormalities   - Troponin x2 wnl. BNP 13 but unreliable given morbid obesity.   - Echo pending   - Continue home lasix

## 2023-07-01 NOTE — ASSESSMENT & PLAN NOTE
- Controlled/uncontrolled  Lab Results   Component Value Date    HGBA1C 5.7 (H) 07/01/2023   - Hold home jardiance while inpatient

## 2023-07-01 NOTE — HPI
Ke Oliver is a 53 y.o. M with HTN, prediabetes, GERD, chronic bronchitis, morbid obesity, and JUDSON who presented to the ED with acute epigastric abdominal pain, which started abruptly at 2300 last night. He reports he was laying back to sleep when he felt a sudden, tearing & pulling epigastric abdominal pain, 11/10 in severity, which radiated to his umbilicus and back. He reports the pain was constant and uncontrolled for roughly 2 hrs and improved to 2/10 without intervention prior to evaluation in the ED. He denies associated fever, chills, CP, SOB, N/V, diarrhea or constipation. Last BM yesterday afternoon and well formed. He reports chronic history of lower extremity edema, SOB, STALLINGS, and three pillow orthopnea over the last three years, which has progressively worsened since that time. He denies exertional chest pain but reports he takes roughly 10 steps before needing to stop for a break to catch his breath. Pt denies palpitations, cough, dysuria, leg pain, weakness, confusion, HA, syncope or recent sick contacts.       In the ED: Hypertensive to 170/87, RR 22, otherwise VSS. Afebrile with no leukocytosis. T ronda 0.8. . . Lipase 234. BNP 13. Troponin x2 wnl. CXR consistent with pulmonary edema. U/S consistent with cholelithiasis with mild wall thickening and mildly dilated CBD measuring 7mm. AES was consulted and the patient was placed in observation for further management.

## 2023-07-01 NOTE — PROVIDER PROGRESS NOTES - EMERGENCY DEPT.
ED Resident HAND-OFF NOTE:  7:17 AM 7/1/2023  Ke Oliver is a 53 y.o. male who presented to the ED on 7/1/2023 and has been managed by Dr. Cabezas and Dr. Weeks, who reports patient C/O epigastric abdominal pain. I assumed care of patient from off-going ED physician team at 7:17 AM pending RUQ US.    On my evaluation, Ke Oliver appears well, hemodynamically stable and in NAD. Thus far, Ke Oliver has received:  Medications   aluminum-magnesium hydroxide-simethicone 200-200-20 mg/5 mL suspension 15 mL (15 mLs Oral Given 7/1/23 0415)     - RUQ US shows cholelithiasis with a dilated CBD (7 mm). Elevated lipase likely gallstone pancreatitis. Explained findings to patient. Admitted to  under OBS with consult placed to AES.    ______________________  Luis Manuel Hurtado MD  Emergency Medicine Resident  7:17 AM 7/1/2023

## 2023-07-01 NOTE — ED PROVIDER NOTES
"Encounter Date: 2023       History     Chief Complaint   Patient presents with    Abdominal Pain     Epigastric "pulling" pain x 1 hour. Noticed when he laid down for bed. Associated shortness of breath d/t pain. Denies fever, chest pain, nausea, vomiting.      Patient is a 53-year-old male with a past medical history of hypertension, obesity and sleep apnea presenting to the ED for complaints of abdominal pain that began about 3-4 hours prior to arrival.  He states he was attempting to go to sleep, when he felt a "pulling pain" preventing him from sleeping.  The pain has since resolved.  He denies any other complaints, including nausea, vomiting, recent illnesses or fevers, chest pain or shortness of breath.  No prior similar episodes.    The history is provided by medical records and the patient. No  was used.   Review of patient's allergies indicates:  No Known Allergies  Past Medical History:   Diagnosis Date    Bilateral primary osteoarthritis of knee     Hypertension     Obesity     Sleep apnea      Past Surgical History:   Procedure Laterality Date    ANKLE SURGERY      ankle fracture pinnned    COLONOSCOPY N/A 2016    COLONOSCOPY;  Surgeon: Carson Mittal MD    HERNIA REPAIR      herniated testicle     Family History   Problem Relation Age of Onset    Cancer Mother         lung    Psoriasis Neg Hx     Eczema Neg Hx      Social History     Tobacco Use    Smoking status: Former     Packs/day: 1.00     Years: 17.00     Pack years: 17.00     Types: Cigarettes     Quit date: 2001     Years since quittin.8    Smokeless tobacco: Never   Substance Use Topics    Alcohol use: Yes     Comment: 3 every other week     Drug use: No         Physical Exam     Initial Vitals [23 0135]   BP Pulse Resp Temp SpO2   (!) 170/87 89 (!) 22 98 °F (36.7 °C) (!) 93 %      MAP       --         Physical Exam    Nursing note and vitals reviewed.  Constitutional: He appears " well-developed. He is not diaphoretic. He is Obese . No distress.   Pleasant.  Well-appearing.  Speaking full sentences.  No acute distress.   HENT:   Head: Normocephalic and atraumatic.   Right Ear: External ear normal.   Left Ear: External ear normal.   Neck: Neck supple.   Cardiovascular:  Normal rate, regular rhythm, normal heart sounds and intact distal pulses.           Pulmonary/Chest: Breath sounds normal. No respiratory distress. He has no wheezes. He has no rhonchi. He has no rales.   Breath sounds clear to auscultation bilaterally.   Abdominal: Abdomen is soft. He exhibits no distension. There is no abdominal tenderness.   No abdominal TTP. There is no rebound and no guarding.   Musculoskeletal:      Cervical back: Neck supple.     Neurological: He is alert and oriented to person, place, and time. GCS score is 15. GCS eye subscore is 4. GCS verbal subscore is 5. GCS motor subscore is 6.   Skin: Skin is warm. Capillary refill takes less than 2 seconds. No rash noted.   Psychiatric: He has a normal mood and affect.       ED Course   Procedures  Labs Reviewed   CBC W/ AUTO DIFFERENTIAL - Abnormal; Notable for the following components:       Result Value    MCH 26.8 (*)     MCHC 30.6 (*)     RDW 14.6 (*)     Lymph # 0.8 (*)     Gran % 79.7 (*)     Lymph % 9.2 (*)     All other components within normal limits   COMPREHENSIVE METABOLIC PANEL - Abnormal; Notable for the following components:     (*)      (*)     All other components within normal limits   LIPASE - Abnormal; Notable for the following components:    Lipase 234 (*)     All other components within normal limits   HEMOGLOBIN A1C - Abnormal; Notable for the following components:    Hemoglobin A1C 5.7 (*)     All other components within normal limits    Narrative:     ADD ON [facility] @[id number] PER SAM VENTURA  07/01/2023  08:20    HIV 1 / 2 ANTIBODY    Narrative:     Release to patient->Immediate   HEPATITIS C ANTIBODY    Narrative:      Release to patient->Immediate   TROPONIN I   TROPONIN I   B-TYPE NATRIURETIC PEPTIDE   TROPONIN ISTAT   TROPONIN ISTAT   HEMOGLOBIN A1C   POCT GLUCOSE   POCT TROPONIN   POCT TROPONIN     EKG Readings: (Independently Interpreted)   Initial Reading: No STEMI. Previous EKG: Compared with most recent EKG Rhythm: Normal Sinus Rhythm. Heart Rate: 84. Ectopy: No Ectopy. Conduction: Normal. Clinical Impression: Normal Sinus Rhythm   ECG Results              EKG 12-lead (Final result)  Result time 07/01/23 09:50:00      Final result by Interface, Lab In [facility] (07/01/23 09:50:00)                   Narrative:    Test Reason : R06.02,    Vent. Rate : 084 BPM     Atrial Rate : 084 BPM     P-R Int : 178 ms          QRS Dur : 096 ms      QT Int : 368 ms       P-R-T Axes : 060 063 003 degrees     QTc Int : 434 ms    Normal sinus rhythm  Normal ECG  When compared with ECG of 27-SEP-2019 08:30,  No significant change was found  Confirmed by Baldomero CHAMBERLAIN MD ([id number]) on 7/1/2023 9:49:48 AM    Referred By: [email or web address]   SELF           Confirmed By:Baldomero CHAMBERLAIN MD                                  Imaging Results              X-Ray Chest AP Portable (Final result)  Result time 07/01/23 12:42:51      Final result by Marko Mcfarlane IV, MD (07/01/23 12:42:51)                   Impression:      As above.      Electronically signed by: Marko Mcfarlane  Date:    07/01/2023  Time:    12:42               Narrative:    EXAMINATION:  XR CHEST AP PORTABLE    CLINICAL HISTORY:  Hypervolemia;    TECHNIQUE:  Single frontal view of the chest was performed.    COMPARISON:  Chest radiograph from earlier today.    FINDINGS:  Mediastinal structures are midline.  Prominent central pulmonary vasculature and enlarged cardiomediastinal silhouette, similar previous exam.    No significant change in cardiopulmonary status.  Lungs are symmetrically expanded noting coarsened interstitial attenuation throughout bilateral lungs suggestive of interstitial edema.  No  "large confluent consolidation.  No pneumothorax.  No significant pleural fluid.                                       X-Ray Chest AP Portable (Final result)  Result time 07/01/23 07:17:29      Final result by Shaji Albarran MD (07/01/23 07:17:29)                   Impression:      Please see discussion above.      Electronically signed by: Shaji Albarran  Date:    07/01/2023  Time:    07:17               Narrative:    EXAMINATION:  XR CHEST AP PORTABLE    CLINICAL HISTORY:  Epigastric pain    TECHNIQUE:  Single frontal view of the chest was performed.    COMPARISON:  Chest radiograph performed 09/03/2020.    FINDINGS:  Comment: Note that there are 2 erroneously placed images of a different patient and PACS at the time of dictation, marked "do not read" by the technologist (time stamped 07/01/2023, 05:28:44 hours).    Evaluation of reportedly correct images (time stamped 07/01/2023, 06:15:07 hours) Demonstrates unchanged size and configuration of the cardiac and mediastinal contours when compared to remote study performed 09/30/2020.  Prominence of central pulmonary vasculature is again noted.  Mildly prominent interstitial markings may be seen in the setting of vascular congestion/mild interstitial edema.    No definite focal airspace consolidation is seen.    No acute findings in the visualized abdomen.    Osseous and soft tissue structures appear without definite acute change.                                        US Abdomen Limited (Final result)  Result time 07/01/23 06:41:57      Final result by Shaji Albarran MD (07/01/23 06:41:57)                   Impression:      Cholelithiasis with mild wall thickening without peripheral hypervascularity.  Cholecystitis is felt unlikely.  If clinical concern remains, can obtain HIDA scan.    Mildly dilated common bile duct measuring 7 mm.  Further evaluation with MRCP.    This report was flagged in Epic as abnormal.    Electronically signed by resident: Dajuan " Richard  Date:    07/01/2023  Time:    06:19    Electronically signed by: Shaji Albarran  Date:    07/01/2023  Time:    06:41               Narrative:    EXAMINATION:  US ABDOMEN LIMITED    CLINICAL HISTORY:  r/o cholecystitis;.    TECHNIQUE:  Limited ultrasound of the right upper quadrant of the abdomen including pancreas, gallbladder, common bile duct was performed to evaluate for cholecystitis.    COMPARISON:  None    FINDINGS:  Gallbladder: Large gallstone measuring up to 5.3 cm.  Mild gallbladder wall thickening measuring 5 mm.  No pericholecystic fluid.  No wall hyperemia. No sonographic Cardoza's sign.    Biliary system: The common duct is mildly dilated, measuring 7 mm.  No intrahepatic ductal dilatation.    Pancreas: The visualized portions of pancreas appear normal.                                    X-Rays:   Independently Interpreted Readings:   Chest X-Ray: Normal heart size.  No infiltrates.  No acute abnormalities.   Medications   fluticasone furoate-vilanteroL 100-25 mcg/dose diskus inhaler 1 puff (1 puff Inhalation Not Given 7/1/23 0900)   furosemide tablet 40 mg (40 mg Oral Given 7/1/23 1035)   lisinopriL tablet 2.5 mg (2.5 mg Oral Given 7/1/23 1035)   pantoprazole EC tablet 40 mg (40 mg Oral Given 7/1/23 1035)   sucralfate 100 mg/mL suspension 1 g (1 g Oral Given 7/1/23 1254)   aluminum-magnesium hydroxide-simethicone 200-200-20 mg/5 mL suspension 30 mL (30 mLs Oral Not Given 7/1/23 1100)   sodium chloride 0.9% flush 10 mL (has no administration in time range)   melatonin tablet 6 mg (has no administration in time range)   ondansetron disintegrating tablet 8 mg (has no administration in time range)   prochlorperazine injection Soln 5 mg (has no administration in time range)   polyethylene glycol packet 17 g (has no administration in time range)   bisacodyL suppository 10 mg (has no administration in time range)   simethicone chewable tablet 80 mg (has no administration in time range)    aluminum-magnesium hydroxide-simethicone 200-200-20 mg/5 mL suspension 30 mL (30 mLs Oral Given 7/1/23 1034)   naloxone 0.4 mg/mL injection 0.02 mg (has no administration in time range)   glucagon (human recombinant) injection 1 mg (has no administration in time range)   dextrose 10% bolus 125 mL 125 mL (has no administration in time range)   dextrose 10% bolus 250 mL 250 mL (has no administration in time range)   heparin (porcine) injection 7,500 Units (7,500 Units Subcutaneous Given 7/1/23 1400)   dextrose 40 % gel 16,000 mg (has no administration in time range)   dextrose 40 % gel 24,000 mg (has no administration in time range)   0.9%  NaCl infusion ( Intravenous New Bag 7/1/23 1253)   acetaminophen tablet 325 mg (has no administration in time range)   aluminum-magnesium hydroxide-simethicone 200-200-20 mg/5 mL suspension 15 mL (15 mLs Oral Given 7/1/23 0415)   perflutren protein-A microsphr 0.22 mg/mL IV susp (0.66 mg Intravenous Given 7/1/23 1216)     Medical Decision Making:   History:   Old Medical Records: I decided to obtain old medical records.  Initial Assessment:   Emergent evaluation of now resolved epigastric abdominal pain.  He is afebrile and hemodynamically stable.  Differential Diagnosis:   Including, but not limited to: ACS (lower suspicion), pancreatitis, GERD/gastritis, hepatobiliary pathology  Independently Interpreted Test(s):   I have ordered and independently interpreted X-rays - see summary below.  I have ordered and independently interpreted EKG Reading(s) - see prior notes  Clinical Tests:   Lab Tests: Ordered and Reviewed  Radiological Study: Ordered and Reviewed  Medical Tests: Ordered and Reviewed  ED Management:  Workup thus far appears to be consistent with biliary colic and likely gallstone pancreatitis.  Labs are remarkable for transaminitis with an AST of 176 and ALT of 108, lipase is 234 within normal T bili.  The patient was signed out to the oncoming team at shift change  pending official read of formal right upper quadrant ultrasound and chest x-ray.  Anticipate likely admission for gallstone pancreatitis.  Other:   I have discussed this case with another health care provider.          Attending Attestation:   Physician Attestation Statement for Resident:  As the supervising MD   Physician Attestation Statement: I have personally seen and examined this patient.   I agree with the above history.  -: 52 yo male presenting with epigastric abdominal pain.  Patient states he ate dinner around 6p.  He woke from sleep with epigastric abdominal pain that has since resolved.  No chest pain or shortness of breath.    As the supervising MD I agree with the above PE.   -: Afebrile, well appearing    Very mild epigastric TTP  Negative Cardoza's  RRR  Lungs clear   Intact and equal distal pulses    As the supervising MD I agree with the above treatment, course, plan, and disposition.   -: Doubt anginal equivalent, EKG w/o acute ischemic changes.  Labs notable for elevated lipase and LFTs.  Doubt aortic pathology given pain has resolved.  Concern for possible gallstone pancreatitis - ordered for RUQ US.  Signed out to oncoming team at 0600 pending US result, anticipate admission.   I have reviewed and agree with the residents interpretation of the following: lab data and EKG.  I have reviewed the following: old records at this facility.              ED Course as of 07/01/23 1627   Sat Jul 01, 2023   0454 Lipase(!): 234 [AB]   0454 AST(!): 176 [AB]   0454 ALT(!): 108 [AB]   0454 WBC: 8.61  No leukocytosis  [AB]      ED Course User Index  [AB] Romeo Cabezas MD                 Clinical Impression:   Final diagnoses:  [R06.02] Shortness of breath  [R07.9] Chest pain  [R10.13] Epigastric abdominal pain  [K80.50] Biliary colic (Primary)  [K80.50] Choledocholithiasis  [K85.10] Gallstone pancreatitis        ED Disposition Condition    Observation                 Juanpablo Weeks MD  Resident  07/01/23  1628       Romeo Cabezas MD  07/01/23 1920

## 2023-07-01 NOTE — ED TRIAGE NOTES
"Ke Oliver, a 53 y.o. male presents to the ED w/ complaint of mid upper abdominal pain started today.    Triage note:  Chief Complaint   Patient presents with    Abdominal Pain     Epigastric "pulling" pain x 1 hour. Noticed when he laid down for bed. Associated shortness of breath d/t pain. Denies fever, chest pain, nausea, vomiting.      Review of patient's allergies indicates:  No Known Allergies  Past Medical History:   Diagnosis Date    Bilateral primary osteoarthritis of knee     Hypertension     Obesity     Sleep apnea     Patient identifiers for Ke Oliver checked and correct.    LOC: The patient is awake, alert and aware of environment with an appropriate affect, the patient is oriented x 4 and speaking appropriately.    APPEARANCE: Patient resting comfortably and in no acute distress, patient is clean and well groomed, patient's clothing is properly fastened.    SKIN: The skin is warm and dry, color consistent with ethnicity, patient has normal skin turgor and moist mucus membranes, skin intact, no breakdown or bruising noted.    MUSCULOSKELETAL: Patient moving all extremities well, no obvious swelling or deformities noted.    RESPIRATORY: Airway is open and patent, respirations are spontaneous and even, patient has a normal effort and rate.    CARDIAC: Patient has a normal rate and rhythm, no periphreal edema noted, capillary refill < 3 seconds.    ABDOMEN: Soft and non tender to palpation, no distention noted. Patient denies any nausea, vomiting, diarrhea, or constipation. Mid upper abdominal pain started today.    NEUROLOGIC: Eyes open spontaneously, PERRL, behavior appropriate to situation, follows commands, facial expression symmetrical, bilateral hand grasp equal and even, purposeful motor response noted, normal sensation in all extremities.     HEENT: No abnormalities noted. White sclera and pupils equal round and reactive to light. Denies headache, dizziness.     : Pt voids " independently, denies dysuria, hematuria, frequency.

## 2023-07-01 NOTE — SUBJECTIVE & OBJECTIVE
Past Medical History:   Diagnosis Date    Bilateral primary osteoarthritis of knee     Hypertension     Obesity     Sleep apnea        Past Surgical History:   Procedure Laterality Date    ANKLE SURGERY      ankle fracture pinnned    COLONOSCOPY N/A 2016    COLONOSCOPY;  Surgeon: Carson Mittal MD    HERNIA REPAIR      herniated testicle       Review of patient's allergies indicates:  No Known Allergies  Family History       Problem Relation (Age of Onset)    Cancer Mother          Tobacco Use    Smoking status: Former     Packs/day: 1.00     Years: 17.00     Pack years: 17.00     Types: Cigarettes     Quit date: 2001     Years since quittin.8    Smokeless tobacco: Never   Substance and Sexual Activity    Alcohol use: Yes     Comment: 3 every other week     Drug use: No    Sexual activity: Not Currently     Review of Systems   Constitutional:  Negative for appetite change, chills and fever.   HENT:  Negative for congestion, sore throat and trouble swallowing.    Eyes:  Negative for photophobia, pain and discharge.   Respiratory:  Negative for cough and shortness of breath.    Cardiovascular:  Negative for chest pain and palpitations.   Gastrointestinal:  Negative for abdominal pain, diarrhea, nausea and vomiting.   Genitourinary:  Negative for difficulty urinating.   Musculoskeletal:  Negative for back pain, myalgias and neck pain.   Skin:  Negative for pallor and rash.   Neurological:  Negative for light-headedness, numbness and headaches.   Objective:     Vital Signs (Most Recent):  Temp: 98.2 °F (36.8 °C) (23)  Pulse: 76 (23)  Resp: 16 (23)  BP: (!) 158/75 (23)  SpO2: 96 % (23) Vital Signs (24h Range):  Temp:  [98 °F (36.7 °C)-98.2 °F (36.8 °C)] 98.2 °F (36.8 °C)  Pulse:  [76-89] 76  Resp:  [16-22] 16  SpO2:  [93 %-96 %] 96 %  BP: (158-170)/(75-87) 158/75        There is no height or weight on file to calculate BMI.    No intake or  output data in the 24 hours ending 07/01/23 0754    Lines/Drains/Airways       Peripheral Intravenous Line  Duration                  Peripheral IV - Single Lumen 07/01/23 0408 20 G Right Antecubital <1 day                     Physical Exam  Constitutional:       Appearance: Normal appearance. He is obese.   Eyes:      General: No scleral icterus.  Cardiovascular:      Rate and Rhythm: Normal rate and regular rhythm.      Pulses: Normal pulses.      Heart sounds: Normal heart sounds.   Pulmonary:      Effort: Pulmonary effort is normal. No respiratory distress.      Breath sounds: Normal breath sounds.   Abdominal:      General: Bowel sounds are normal. There is no distension.      Palpations: Abdomen is soft.      Tenderness: There is no abdominal tenderness.   Skin:     General: Skin is warm and dry.      Coloration: Skin is not jaundiced.   Neurological:      Mental Status: He is alert and oriented to person, place, and time.        Significant Labs:  All pertinent lab results from the last 24 hours have been reviewed.    Significant Imaging:  Imaging results within the past 24 hours have been reviewed.

## 2023-07-01 NOTE — ASSESSMENT & PLAN NOTE
Body mass index is 99.31 kg/m². Morbid obesity complicates all aspects of disease management from diagnostic modalities to treatment. Weight loss encouraged and health benefits explained to patient.  - Has tried ozempic & topamax in the past with no improvement  - Consider amb ref to bariatric surgery at discharge

## 2023-07-01 NOTE — ASSESSMENT & PLAN NOTE
- Elevated on admission   - Latest BP and vitals reviewed  - Continue home meds for HTN: lisinopril 2.5 mg, lasix 40 mg BID  - Goal SBP 120s-140s. Utilize p.r.n. antihypertensives only if patient's BP >180/110 & develop symptoms of worsening CP or SOB.

## 2023-07-01 NOTE — CONSULTS
[facility] Consult Note    Attending: Randall Aviles MD   Admit Date: 7/1/2023  Today's Date: 07/01/2023    SUBJECTIVE:     HPI:  Mr. Ke Oliver is a 53 year old male for whom AES is consulted for management of gallstone pancreatitis. He has a PMH significant HTN, obesity (on OZEMPIC), and JUDSON.     Patient reports onset of epigastric pain radiating down abdominal midline on 06/30 upon laying down for bed. He reports symptom association with shortness of breath, but denies symptom association with nausea, vomiting, fevers, chills, skin/eye yellowing, ETOH consumption, prior abdominal surgeries, or prior episodes of pancreatitis. Due to symptoms, he presented to ED here on 07/01.     Hospital Course:   On arrival, vital signs notable for hypertension ('s). Labs notable for normal WBC, normal Hgb, transaminitis (, ), and elevated lipase (234). Abdominal US showed cholelithiasis with a large gallstone measuring up to 5.3 cm and a mild dilated common bile duct without intra-hepatic ductal dilatation. He was admitted to hospital medicine and AES consulted.  On initial bedside interview, patient reports resolution of pain following receiving GI cocktail.       Review of patient's allergies indicates:  No Known Allergies    Past Medical History:   Diagnosis Date    Bilateral primary osteoarthritis of knee     Hypertension     Obesity     Sleep apnea      Past Surgical History:   Procedure Laterality Date    ANKLE SURGERY  1978    ankle fracture pinnned    COLONOSCOPY N/A 8/4/2016    COLONOSCOPY;  Surgeon: Carson Mittal MD    HERNIA REPAIR  1999    herniated testicle     Family History   Problem Relation Age of Onset    Cancer Mother         lung    Psoriasis Neg Hx     Eczema Neg Hx      Social History     Tobacco Use    Smoking status: Former     Packs/day: 1.00     Years: 17.00     Pack years: 17.00     Types: Cigarettes     Quit date: 8/19/2001     Years since  quittin.8    Smokeless tobacco: Never   Substance Use Topics    Alcohol use: Yes     Comment: 3 every other week     Drug use: No       All home medications reviewed.    Review of Systems   Constitutional:  Positive for malaise/fatigue. Negative for chills, fever and weight loss.   HENT:  Negative for congestion and sore throat.    Eyes:  Negative for blurred vision and double vision.   Respiratory:  Negative for cough, sputum production and shortness of breath.    Cardiovascular:  Negative for chest pain, palpitations and leg swelling.   Gastrointestinal:  Negative for abdominal pain, diarrhea, heartburn, nausea and vomiting.   Genitourinary:  Negative for dysuria and urgency.   Musculoskeletal:  Negative for back pain, myalgias and neck pain.   Neurological:  Negative for dizziness, tingling, sensory change, weakness and headaches.   Endo/Heme/Allergies:  Negative for polydipsia.     OBJECTIVE:     Vital Signs Trends/History Reviewed  Vitals:    23 0630 23 0756 23 0951 23 1054   BP: (!) 158/75 (!) 151/77 136/65    BP Location: Right arm Left arm Right arm    Patient Position: Sitting Sitting Lying    Pulse: 76 70     Resp: 16 19 18    Temp: 98.2 °F (36.8 °C) 98.2 °F (36.8 °C) 98.1 °F (36.7 °C)    TempSrc: Oral Oral Oral    SpO2: 96% 98%     Weight:    (!) 270.7 kg (596 lb 12.6 oz)       Physical Exam  Constitutional:       Appearance: Normal appearance. He is obese.   Eyes:      General: No scleral icterus.  Cardiovascular:      Rate and Rhythm: Normal rate and regular rhythm.      Pulses: Normal pulses.      Heart sounds: Normal heart sounds.   Pulmonary:      Effort: Pulmonary effort is normal. No respiratory distress.      Breath sounds: Normal breath sounds.   Abdominal:      General: Bowel sounds are normal. There is no distension.      Palpations: Abdomen is soft.      Tenderness: There is abdominal tenderness in the epigastric area.   Musculoskeletal:      Right lower leg: Edema  present.      Left lower leg: Edema present.   Skin:     General: Skin is warm and dry.      Coloration: Skin is not jaundiced.   Neurological:      Mental Status: He is alert and oriented to person, place, and time.       Laboratory: All labs results within last 24 hours have been reviewed.     Imaging: All imaging results within the last 24 hours have been reviewed.     Infusions:     sodium chloride 0.9%       Scheduled Medications:    aluminum-magnesium hydroxide-simethicone  30 mL Oral QID (AC & HS)    fluticasone furoate-vilanteroL  1 puff Inhalation Daily    furosemide  40 mg Oral BID    heparin (porcine)  7,500 Units Subcutaneous Q8H    lisinopriL  2.5 mg Oral Daily    pantoprazole  40 mg Oral Daily    sucralfate  1 g Oral Q6H       PRN Medications:   acetaminophen, aluminum-magnesium hydroxide-simethicone, bisacodyL, dextrose 10%, dextrose 10%, dextrose, dextrose, glucagon (human recombinant), melatonin, naloxone, ondansetron, polyethylene glycol, prochlorperazine, simethicone, sodium chloride 0.9%    ASSESSMENT & RECOMMENDATIONS     This is a 53 year old male with PMH significant for HTN, obesity (on OZEMPIC), and JUDSON who was admitted to [facility] on 07/01 for management of gallstone pancreatitis without associated cholangitis. He is asymptomatic currently following receiving GI cocktail on arrival.     Problem List:   #1. Gallstone pancreatitis.   #2. Cholelithiasis.   #3. Obesity  #4. JUDSON    Recommendations:     -IVF with LR, pain control PRN, and NPO with advancement of diet to clears if reporting hunger.   -Obtain MRCP to evaluate for retained stone in bile ducts.   -CMP and INR daily.   -Cholecystectomy prior to discharge.       Thank you for allowing us to participate in the care of this patient. Please call with questions.        Kevin Mora MD, PGY-VI  Gastroenterology Fellow  [facility]

## 2023-07-01 NOTE — SUBJECTIVE & OBJECTIVE
Past Medical History:   Diagnosis Date    Bilateral primary osteoarthritis of knee     Hypertension     Obesity     Sleep apnea        Past Surgical History:   Procedure Laterality Date    ANKLE SURGERY  1978    ankle fracture pinnned    COLONOSCOPY N/A 8/4/2016    COLONOSCOPY;  Surgeon: Carson Mittal MD    HERNIA REPAIR  1999    herniated testicle       Review of patient's allergies indicates:  No Known Allergies    No current facility-administered medications on file prior to encounter.     Current Outpatient Medications on File Prior to Encounter   Medication Sig    acetaminophen (TYLENOL) 325 MG tablet Take 2 tablets (650 mg total) by mouth every 6 (six) hours as needed (arthritis pain).    albuterol (ACCUNEB) 0.63 mg/3 mL Nebu Take 3 mLs (0.63 mg total) by nebulization every 6 (six) hours as needed. Rescue    BREO ELLIPTA 100-25 mcg/dose diskus inhaler INHALE 1 PUFF INTO THE LUNGS ONCE DAILY    furosemide (LASIX) 40 MG tablet TAKE 1 TABLET BY MOUTH TWICE A DAY    JARDIANCE 10 mg tablet TAKE 1 TABLET BY MOUTH EVERY DAY    ketoconazole (NIZORAL) 2 % shampoo APPLY TOPICALLY TWICE A WEEK. APPLY TO SCALP, LATHER, LET SIT 2-5 MINUTES THEN RINSE.    lisinopriL (PRINIVIL,ZESTRIL) 2.5 MG tablet TAKE 1 TABLET BY MOUTH EVERY DAY    nystatin (MYCOSTATIN) powder Apply topically 2 (two) times daily.    omeprazole (PRILOSEC) 40 MG capsule TAKE 1 CAPSULE BY MOUTH EVERY DAY    potassium chloride (MICRO-K) 10 MEQ CpSR TAKE 1 CAPSULE BY MOUTH ONCE DAILY. WHEN TAKING FUROSEMIDE(LASIX) FOR 60 DOSES    tadalafiL (CIALIS) 20 MG Tab Take 1 tablet (20 mg total) by mouth once daily. Take 1 hour before intercourse    topiramate (TOPAMAX) 200 MG Tab TAKE 1 TABLET BY MOUTH TWICE A DAY     Family History       Problem Relation (Age of Onset)    Cancer Mother          Tobacco Use    Smoking status: Former     Packs/day: 1.00     Years: 17.00     Pack years: 17.00     Types: Cigarettes     Quit date: 8/19/2001     Years since quitting:  21.8    Smokeless tobacco: Never   Substance and Sexual Activity    Alcohol use: Yes     Comment: 3 every other week     Drug use: No    Sexual activity: Not Currently     Review of Systems   Constitutional:  Positive for activity change. Negative for chills and fever.   HENT:  Negative for trouble swallowing.    Eyes:  Negative for photophobia and visual disturbance.   Respiratory:  Positive for shortness of breath. Negative for cough, chest tightness and wheezing.    Cardiovascular:  Positive for leg swelling. Negative for chest pain and palpitations.   Gastrointestinal:  Positive for abdominal pain. Negative for constipation, diarrhea, nausea and vomiting.   Genitourinary:  Negative for dysuria, frequency, hematuria and urgency.   Musculoskeletal:  Positive for back pain. Negative for arthralgias and gait problem.   Skin:  Negative for color change and rash.   Neurological:  Negative for dizziness, syncope, weakness, light-headedness, numbness and headaches.   Psychiatric/Behavioral:  Negative for agitation and confusion. The patient is not nervous/anxious.    Objective:     Vital Signs (Most Recent):  Temp: 98.2 °F (36.8 °C) (07/01/23 0756)  Pulse: 70 (07/01/23 0756)  Resp: 19 (07/01/23 0756)  BP: (!) 151/77 (07/01/23 0756)  SpO2: 98 % (07/01/23 0756) Vital Signs (24h Range):  Temp:  [98 °F (36.7 °C)-98.2 °F (36.8 °C)] 98.2 °F (36.8 °C)  Pulse:  [70-89] 70  Resp:  [16-22] 19  SpO2:  [93 %-98 %] 98 %  BP: (151-170)/(75-87) 151/77        There is no height or weight on file to calculate BMI.     Physical Exam  Vitals and nursing note reviewed.   Constitutional:       General: He is not in acute distress.     Appearance: He is well-developed. He is obese. He is not ill-appearing.   HENT:      Head: Normocephalic and atraumatic.      Mouth/Throat:      Mouth: Mucous membranes are dry.   Eyes:      General: No scleral icterus.     Conjunctiva/sclera: Conjunctivae normal.      Pupils: Pupils are equal, round, and  reactive to light.   Cardiovascular:      Rate and Rhythm: Normal rate and regular rhythm.      Heart sounds: Normal heart sounds.   Pulmonary:      Effort: Pulmonary effort is normal. No respiratory distress.      Comments: Diminished breath sounds bilaterally 2/2 body habitus  Abdominal:      General: Bowel sounds are normal. There is no distension.      Palpations: Abdomen is soft.      Tenderness: There is abdominal tenderness in the epigastric area. There is no guarding or rebound.   Musculoskeletal:         General: No tenderness. Normal range of motion.      Cervical back: Normal range of motion and neck supple.      Right lower leg: Edema present.      Left lower leg: Edema present.   Skin:     General: Skin is warm and dry.      Capillary Refill: Capillary refill takes less than 2 seconds.   Neurological:      Mental Status: He is alert and oriented to person, place, and time.      Cranial Nerves: No cranial nerve deficit.      Sensory: No sensory deficit.   Psychiatric:         Behavior: Behavior normal.         Thought Content: Thought content normal.         Judgment: Judgment normal.            CRANIAL NERVES     CN III, IV, VI   Pupils are equal, round, and reactive to light.     Significant Labs: All pertinent labs within the past 24 hours have been reviewed.  CBC:   Recent Labs   Lab 07/01/23  0407   WBC 8.61   HGB 14.0   HCT 45.8        CMP:   Recent Labs   Lab 07/01/23  0407      K 4.7      CO2 25   GLU 94   BUN 15   CREATININE 0.9   CALCIUM 9.7   PROT 7.9   ALBUMIN 3.5   BILITOT 0.8   ALKPHOS 110   *   *   ANIONGAP 9     Lipase:   Recent Labs   Lab 07/01/23  0407   LIPASE 234*       Significant Imaging: I have reviewed all pertinent imaging results/findings within the past 24 hours.    US Abdomen Limited  Narrative: EXAMINATION:  US ABDOMEN LIMITED    CLINICAL HISTORY:  r/o cholecystitis;.    TECHNIQUE:  Limited ultrasound of the right upper quadrant of the abdomen  including pancreas, gallbladder, common bile duct was performed to evaluate for cholecystitis.    COMPARISON:  None    FINDINGS:  Gallbladder: Large gallstone measuring up to 5.3 cm.  Mild gallbladder wall thickening measuring 5 mm.  No pericholecystic fluid.  No wall hyperemia. No sonographic Cardoza's sign.    Biliary system: The common duct is mildly dilated, measuring 7 mm.  No intrahepatic ductal dilatation.    Pancreas: The visualized portions of pancreas appear normal.  Impression: Cholelithiasis with mild wall thickening without peripheral hypervascularity.  Cholecystitis is felt unlikely.  If clinical concern remains, can obtain HIDA scan.    Mildly dilated common bile duct measuring 7 mm.  Further evaluation with MRCP.    This report was flagged in Epic as abnormal.    Electronically signed by resident: Dajuan Ramos  Date:    07/01/2023  Time:    06:19    Electronically signed by: Shaji Albarran  Date:    07/01/2023  Time:    06:41

## 2023-07-02 LAB
ALBUMIN SERPL BCP-MCNC: 3.1 G/DL (ref 3.5–5.2)
ALP SERPL-CCNC: 153 U/L (ref 55–135)
ALT SERPL W/O P-5'-P-CCNC: 307 U/L (ref 10–44)
ANION GAP SERPL CALC-SCNC: 9 MMOL/L (ref 8–16)
AST SERPL-CCNC: 216 U/L (ref 10–40)
BASOPHILS # BLD AUTO: 0.03 K/UL (ref 0–0.2)
BASOPHILS NFR BLD: 0.5 % (ref 0–1.9)
BILIRUB SERPL-MCNC: 1 MG/DL (ref 0.1–1)
BUN SERPL-MCNC: 10 MG/DL (ref 6–20)
CALCIUM SERPL-MCNC: 8.7 MG/DL (ref 8.7–10.5)
CHLORIDE SERPL-SCNC: 104 MMOL/L (ref 95–110)
CO2 SERPL-SCNC: 27 MMOL/L (ref 23–29)
CREAT SERPL-MCNC: 1 MG/DL (ref 0.5–1.4)
DIFFERENTIAL METHOD: ABNORMAL
EOSINOPHIL # BLD AUTO: 0.1 K/UL (ref 0–0.5)
EOSINOPHIL NFR BLD: 2.3 % (ref 0–8)
ERYTHROCYTE [DISTWIDTH] IN BLOOD BY AUTOMATED COUNT: 14.8 % (ref 11.5–14.5)
EST. GFR  (NO RACE VARIABLE): >60 ML/MIN/1.73 M^2
GLUCOSE SERPL-MCNC: 95 MG/DL (ref 70–110)
HCT VFR BLD AUTO: 41.5 % (ref 40–54)
HGB BLD-MCNC: 12.8 G/DL (ref 14–18)
IMM GRANULOCYTES # BLD AUTO: 0.01 K/UL (ref 0–0.04)
IMM GRANULOCYTES NFR BLD AUTO: 0.2 % (ref 0–0.5)
INR PPP: 1.1 (ref 0.8–1.2)
LIPASE SERPL-CCNC: 14 U/L (ref 4–60)
LYMPHOCYTES # BLD AUTO: 1 K/UL (ref 1–4.8)
LYMPHOCYTES NFR BLD: 15.7 % (ref 18–48)
MAGNESIUM SERPL-MCNC: 2.1 MG/DL (ref 1.6–2.6)
MCH RBC QN AUTO: 26.9 PG (ref 27–31)
MCHC RBC AUTO-ENTMCNC: 30.8 G/DL (ref 32–36)
MCV RBC AUTO: 87 FL (ref 82–98)
MONOCYTES # BLD AUTO: 0.7 K/UL (ref 0.3–1)
MONOCYTES NFR BLD: 11.2 % (ref 4–15)
NEUTROPHILS # BLD AUTO: 4.3 K/UL (ref 1.8–7.7)
NEUTROPHILS NFR BLD: 70.1 % (ref 38–73)
NRBC BLD-RTO: 0 /100 WBC
PHOSPHATE SERPL-MCNC: 3.1 MG/DL (ref 2.7–4.5)
PLATELET # BLD AUTO: 255 K/UL (ref 150–450)
PMV BLD AUTO: 10.2 FL (ref 9.2–12.9)
POTASSIUM SERPL-SCNC: 4.2 MMOL/L (ref 3.5–5.1)
PROT SERPL-MCNC: 6.7 G/DL (ref 6–8.4)
PROTHROMBIN TIME: 11.3 SEC (ref 9–12.5)
RBC # BLD AUTO: 4.76 M/UL (ref 4.6–6.2)
SODIUM SERPL-SCNC: 140 MMOL/L (ref 136–145)
WBC # BLD AUTO: 6.16 K/UL (ref 3.9–12.7)

## 2023-07-02 PROCEDURE — 84100 ASSAY OF PHOSPHORUS: CPT

## 2023-07-02 PROCEDURE — G0378 HOSPITAL OBSERVATION PER HR: HCPCS

## 2023-07-02 PROCEDURE — 99233 PR SUBSEQUENT HOSPITAL CARE,LEVL III: ICD-10-PCS | Mod: ,,,

## 2023-07-02 PROCEDURE — 99900035 HC TECH TIME PER 15 MIN (STAT)

## 2023-07-02 PROCEDURE — 63600175 PHARM REV CODE 636 W HCPCS

## 2023-07-02 PROCEDURE — 94660 CPAP INITIATION&MGMT: CPT

## 2023-07-02 PROCEDURE — 99233 SBSQ HOSP IP/OBS HIGH 50: CPT | Mod: ,,,

## 2023-07-02 PROCEDURE — 36415 COLL VENOUS BLD VENIPUNCTURE: CPT

## 2023-07-02 PROCEDURE — 85025 COMPLETE CBC W/AUTO DIFF WBC: CPT

## 2023-07-02 PROCEDURE — 96372 THER/PROPH/DIAG INJ SC/IM: CPT

## 2023-07-02 PROCEDURE — 83690 ASSAY OF LIPASE: CPT

## 2023-07-02 PROCEDURE — 94761 N-INVAS EAR/PLS OXIMETRY MLT: CPT

## 2023-07-02 PROCEDURE — 80053 COMPREHEN METABOLIC PANEL: CPT

## 2023-07-02 PROCEDURE — 83735 ASSAY OF MAGNESIUM: CPT

## 2023-07-02 PROCEDURE — 25000003 PHARM REV CODE 250

## 2023-07-02 PROCEDURE — 85610 PROTHROMBIN TIME: CPT

## 2023-07-02 RX ADMIN — HEPARIN SODIUM 7500 UNITS: 5000 INJECTION INTRAVENOUS; SUBCUTANEOUS at 02:07

## 2023-07-02 RX ADMIN — PANTOPRAZOLE SODIUM 40 MG: 40 TABLET, DELAYED RELEASE ORAL at 09:07

## 2023-07-02 RX ADMIN — FUROSEMIDE 40 MG: 40 TABLET ORAL at 09:07

## 2023-07-02 RX ADMIN — ALUMINUM HYDROXIDE, MAGNESIUM HYDROXIDE, AND SIMETHICONE 30 ML: 200; 200; 20 SUSPENSION ORAL at 10:07

## 2023-07-02 RX ADMIN — ALUMINUM HYDROXIDE, MAGNESIUM HYDROXIDE, AND SIMETHICONE 30 ML: 200; 200; 20 SUSPENSION ORAL at 04:07

## 2023-07-02 RX ADMIN — ALUMINUM HYDROXIDE, MAGNESIUM HYDROXIDE, AND SIMETHICONE 30 ML: 200; 200; 20 SUSPENSION ORAL at 12:07

## 2023-07-02 RX ADMIN — HEPARIN SODIUM 7500 UNITS: 5000 INJECTION INTRAVENOUS; SUBCUTANEOUS at 10:07

## 2023-07-02 RX ADMIN — HEPARIN SODIUM 7500 UNITS: 5000 INJECTION INTRAVENOUS; SUBCUTANEOUS at 06:07

## 2023-07-02 RX ADMIN — SUCRALFATE 1 G: 1 SUSPENSION ORAL at 12:07

## 2023-07-02 RX ADMIN — FUROSEMIDE 40 MG: 40 TABLET ORAL at 10:07

## 2023-07-02 RX ADMIN — SUCRALFATE 1 G: 1 SUSPENSION ORAL at 05:07

## 2023-07-02 RX ADMIN — ALUMINUM HYDROXIDE, MAGNESIUM HYDROXIDE, AND SIMETHICONE 30 ML: 200; 200; 20 SUSPENSION ORAL at 06:07

## 2023-07-02 RX ADMIN — SUCRALFATE 1 G: 1 SUSPENSION ORAL at 06:07

## 2023-07-02 RX ADMIN — LISINOPRIL 2.5 MG: 2.5 TABLET ORAL at 09:07

## 2023-07-02 NOTE — CONSULTS
[facility]  General Surgery  Consult Note    Inpatient consult to General Surgery  Consult performed by: Pinky Marino MD  Consult ordered by: Sita Dominguez PA-C      Subjective:     Chief Complaint/Reason for Admission: pancreatitis    History of Present Illness:   Patient is a 53y M w/ hx of severe morbid obesity w/ BMI of 96, JUDSON, HTN, HLD who presented to the ED today with epigastric abdominal pain. No nausea, vomiting, fevers, chills, CP, SOB, diarrhea. WBC wnl. Labs significant for elevated AST/ALT, lipase at 234. US imaging revealing cholelithiasis w/ CBD of 7mm and a large gallstone measuring up to 5.3 cm in size.   GI has been consulted and plans for an ERCP on Monday. Consult to general surgery for cholecystectomy this hospitalization.   The patient has no surgical history.  No AC. No hx MI or CVA.     No current facility-administered medications on file prior to encounter.     Current Outpatient Medications on File Prior to Encounter   Medication Sig    acetaminophen (TYLENOL) 325 MG tablet Take 2 tablets (650 mg total) by mouth every 6 (six) hours as needed (arthritis pain).    albuterol (ACCUNEB) 0.63 mg/3 mL Nebu Take 3 mLs (0.63 mg total) by nebulization every 6 (six) hours as needed. Rescue    BREO ELLIPTA 100-25 mcg/dose diskus inhaler INHALE 1 PUFF INTO THE LUNGS ONCE DAILY    furosemide (LASIX) 40 MG tablet TAKE 1 TABLET BY MOUTH TWICE A DAY    JARDIANCE 10 mg tablet TAKE 1 TABLET BY MOUTH EVERY DAY    ketoconazole (NIZORAL) 2 % shampoo APPLY TOPICALLY TWICE A WEEK. APPLY TO SCALP, LATHER, LET SIT 2-5 MINUTES THEN RINSE.    lisinopriL (PRINIVIL,ZESTRIL) 2.5 MG tablet TAKE 1 TABLET BY MOUTH EVERY DAY    nystatin (MYCOSTATIN) powder Apply topically 2 (two) times daily.    omeprazole (PRILOSEC) 40 MG capsule TAKE 1 CAPSULE BY MOUTH EVERY DAY    potassium chloride (MICRO-K) 10 MEQ CpSR TAKE 1 CAPSULE BY MOUTH ONCE DAILY. WHEN TAKING FUROSEMIDE(LASIX) FOR 60 DOSES    tadalafiL  (CIALIS) 20 MG Tab Take 1 tablet (20 mg total) by mouth once daily. Take 1 hour before intercourse    topiramate (TOPAMAX) 200 MG Tab TAKE 1 TABLET BY MOUTH TWICE A DAY       Review of patient's allergies indicates:  No Known Allergies    Past Medical History:   Diagnosis Date    Bilateral primary osteoarthritis of knee     Hypertension     Obesity     Sleep apnea      Past Surgical History:   Procedure Laterality Date    ANKLE SURGERY      ankle fracture pinnned    COLONOSCOPY N/A 2016    COLONOSCOPY;  Surgeon: Carson Mittal MD    HERNIA REPAIR      herniated testicle     Family History       Problem Relation (Age of Onset)    Cancer Mother          Tobacco Use    Smoking status: Former     Packs/day: 1.00     Years: 17.00     Pack years: 17.00     Types: Cigarettes     Quit date: 2001     Years since quittin.8    Smokeless tobacco: Never   Substance and Sexual Activity    Alcohol use: Yes     Comment: 3 every other week     Drug use: No    Sexual activity: Not Currently     Review of Systems   Constitutional:  Positive for chills.   HENT: Negative.     Eyes: Negative.    Respiratory: Negative.     Gastrointestinal:  Positive for abdominal pain.   Genitourinary: Negative.    Objective:     Vital Signs (Most Recent):  Temp: 98.3 °F (36.8 °C) (23)  Pulse: 65 (23)  Resp: 18 (23)  BP: 136/64 (23)  SpO2: 96 % (23) Vital Signs (24h Range):  Temp:  [96.7 °F (35.9 °C)-98.3 °F (36.8 °C)] 98.3 °F (36.8 °C)  Pulse:  [60-89] 65  Resp:  [16-22] 18  SpO2:  [93 %-99 %] 96 %  BP: (134-178)/() 136/64     Weight: (!) 270.7 kg (596 lb 12.6 oz)  Body mass index is 96.32 kg/m².    No intake or output data in the 24 hours ending 23    Physical Exam  Constitutional:       General: He is not in acute distress.     Appearance: Normal appearance. He is obese. He is not ill-appearing.   HENT:      Head: Normocephalic and atraumatic.       Mouth/Throat:      Mouth: Mucous membranes are moist.   Eyes:      Pupils: Pupils are equal, round, and reactive to light.   Cardiovascular:      Rate and Rhythm: Normal rate.   Pulmonary:      Effort: Pulmonary effort is normal. No respiratory distress.   Abdominal:      General: Abdomen is flat. There is no distension.      Palpations: Abdomen is soft.      Tenderness: There is abdominal tenderness.   Musculoskeletal:         General: Normal range of motion.      Cervical back: Normal range of motion.   Skin:     General: Skin is warm and dry.   Neurological:      General: No focal deficit present.      Mental Status: He is alert and oriented to person, place, and time.   Psychiatric:         Mood and Affect: Mood normal.         Behavior: Behavior normal.       Significant Labs:  All pertinent labs from the last 24 hours have been reviewed.    Significant Diagnostics:  I have reviewed all pertinent imaging results/findings within the past 24 hours.    Assessment/Plan:     Active Diagnoses:    Diagnosis Date Noted POA    PRINCIPAL PROBLEM:  Acute gallstone pancreatitis [K85.10] 07/01/2023 Yes    Simple chronic bronchitis [J41.0] 04/08/2020 Yes    STALLINGS (dyspnea on exertion) [R06.09] 09/27/2019 Yes    Prediabetes [R73.03] 11/30/2016 Yes    Essential hypertension [I10] 04/17/2014 Yes    Morbid obesity with body mass index of 70 and over in adult [E66.01, Z68.45] 04/17/2014 Not Applicable     Chronic    Obstructive sleep apnea on CPAP [G47.33, Z99.89] 03/27/2014 Not Applicable      Problems Resolved During this Admission:    Diagnosis Date Noted Date Resolved POA    BPH with urinary obstruction [N40.1, N13.8] 02/17/2022 07/01/2023 Yes   Patient is a 53y M w/ hx of morbid obesity, HTN, HLD, JUDSON who presents with gallstone pancreatitis. GI and AES consulted with plan for ERCP on Monday. Consult to general surgery regarding cholecystectomy during this hospitalization. Will discuss possible surgical plan with staff, OR  timing TBD.     Thank you for your consult. I will follow-up with patient. Please contact us if you have any additional questions.    Pinky Marino MD  General Surgery  [facility] - Observation 11H

## 2023-07-02 NOTE — PROGRESS NOTES
Pt arrived to room 1152 from ED. He is alert and oriented. PA made aware of elevated bp and prn orders placed. IVFs started per MAR. Bed in low position, call bell within reach.

## 2023-07-02 NOTE — TREATMENT PLAN
AES Treatment Plan    Ke Oliver is a 53 y.o. male admitted to hospital 7/1/2023 (Hospital Day: 2) due to Acute gallstone pancreatitis.     Interval History  Unable to obtain CT or MRI to further evaluate bile ducts due to patient's BMI. No overnight events documented. This morning at bedside, patient denies abdominal pain, nausea, and vomiting. He is tolerating PO intake and on review of MAR no opioids given since admission. Labs notable for uptrending LFT's (, ).     Objective  Temp:  [96.7 °F (35.9 °C)-98.3 °F (36.8 °C)] 98.2 °F (36.8 °C) (07/02 0947)  Pulse:  [55-76] 67 (07/02 0947)  BP: (124-178)/() 141/96 (07/02 0947)  Resp:  [16-20] 18 (07/02 0947)  SpO2:  [94 %-100 %] 99 % (07/02 0947)    General: Alert, Oriented x3, obese male; no distress  Abdomen: Non-distended. Normal tympany. Soft. Non-tender. No peritoneal signs.    Laboratory  Lab Results   Component Value Date    WBC 6.16 07/02/2023    HGB 12.8 (L) 07/02/2023    HCT 41.5 07/02/2023    MCV 87 07/02/2023     07/02/2023       Lab Results   Component Value Date     (H) 07/02/2023     (H) 07/02/2023    ALKPHOS 153 (H) 07/02/2023    BILITOT 1.0 07/02/2023     Assessment  This is a 53 year old male with PMH significant for HTN, obesity (BMI >90; on OZEMPIC), and JUDSON who was admitted to [facility] on 07/01 for management of gallstone pancreatitis without associated cholangitis. He is asymptomatic currently following receiving GI cocktail on arrival. Unable to obtain dedicated imaging of abdomen/bile ducts due to patient's BMI.      Problem List:   #1. Gallstone pancreatitis.   #2. Cholelithiasis.   #3. Obesity  #4. JUDSON    Recommendations    -Obtain repeat liver US to assess for any progressive biliary dilatation.   -Okay to continue clear liquid diet for now.   -Will assess weight restrictions for fluoroscopy table prior to making decision regarding EUS or ERCP.   -Cholecystectomy prior to discharge. As an  alternative to ERCP, cholangiogram can be performed intra-op.   -Hepatic function panel daily.     Thank you for involving us in the care of Ke Oliver. Please call with any additional questions, concerns or changes in the patient's clinical status.        Kevin Mora MD, PGY-VI  Gastroenterology Fellow  [facility]

## 2023-07-02 NOTE — HOSPITAL COURSE
Mr. Oliver was admitted to hospital medicine with acute gallstone pancreatitis. AES consulted and repeat u/s revealed improving CBD dilation, no AES intervention warranted. Lipase WNL and liver enzymes are improving. General surgery consulted; no surgical interventions recommended at this time. Instead, recommended outpatient follow up with bariatric surgery given risk associated with morbid obesity. On my evaluation this morning, the patient reports feeling well and is eager to discharge home. All questions were answered. Patient acknowledged understanding of discharge instructions and feels safe to discharge home. Patient was discharged on 7/3/2023 in stable condition with PCP, GI, and bariatric surgery follow-ups. Return precautions given.

## 2023-07-02 NOTE — SUBJECTIVE & OBJECTIVE
Interval History: Pt seen and examined by me this morning. VSSAF. REESE. The patient reports feeling well and denies repeat episodes of abdominal pain or N/V. He is tolerating CLD well. Lipase downtrending but liver markers uptrending.       Review of Systems   Constitutional:  Negative for activity change, chills and fever.   HENT:  Negative for trouble swallowing.    Eyes:  Negative for photophobia and visual disturbance.   Respiratory:  Positive for shortness of breath (intermittent). Negative for cough, chest tightness and wheezing.    Cardiovascular:  Negative for chest pain, palpitations and leg swelling.   Gastrointestinal:  Positive for abdominal pain. Negative for constipation, diarrhea, nausea and vomiting.   Genitourinary:  Negative for dysuria, frequency, hematuria and urgency.   Musculoskeletal:  Positive for back pain. Negative for arthralgias and gait problem.   Skin:  Negative for color change and rash.   Neurological:  Negative for dizziness, syncope, weakness, light-headedness, numbness and headaches.   Psychiatric/Behavioral:  Negative for agitation and confusion. The patient is not nervous/anxious.    Objective:     Vital Signs (Most Recent):  Temp: 97.6 °F (36.4 °C) (07/02/23 1226)  Pulse: 66 (07/02/23 1226)  Resp: 17 (07/02/23 1226)  BP: 123/61 (07/02/23 1226)  SpO2: 97 % (07/02/23 1226) Vital Signs (24h Range):  Temp:  [96.7 °F (35.9 °C)-98.3 °F (36.8 °C)] 97.6 °F (36.4 °C)  Pulse:  [55-76] 66  Resp:  [16-20] 17  SpO2:  [94 %-100 %] 97 %  BP: (123-178)/() 123/61     Weight: (!) 270.7 kg (596 lb 12.6 oz)  Body mass index is 96.32 kg/m².  No intake or output data in the 24 hours ending 07/02/23 1534      Physical Exam  Vitals and nursing note reviewed.   Constitutional:       General: He is not in acute distress.     Appearance: He is well-developed. He is obese. He is not ill-appearing.   HENT:      Head: Normocephalic and atraumatic.      Mouth/Throat:      Mouth: Mucous membranes are  moist.   Eyes:      General: No scleral icterus.     Conjunctiva/sclera: Conjunctivae normal.      Pupils: Pupils are equal, round, and reactive to light.   Cardiovascular:      Rate and Rhythm: Normal rate and regular rhythm.      Heart sounds: Normal heart sounds.   Pulmonary:      Effort: Pulmonary effort is normal. No respiratory distress.      Comments: Diminished breath sounds bilaterally 2/2 body habitus  Abdominal:      General: Bowel sounds are normal. There is no distension.      Palpations: Abdomen is soft.      Tenderness: There is no abdominal tenderness. There is no guarding or rebound.   Musculoskeletal:         General: No tenderness. Normal range of motion.      Cervical back: Normal range of motion and neck supple.      Right lower leg: Edema present.      Left lower leg: Edema present.   Skin:     General: Skin is warm and dry.      Capillary Refill: Capillary refill takes less than 2 seconds.   Neurological:      Mental Status: He is alert and oriented to person, place, and time.      Cranial Nerves: No cranial nerve deficit.      Sensory: No sensory deficit.   Psychiatric:         Behavior: Behavior normal.         Thought Content: Thought content normal.         Judgment: Judgment normal.           Significant Labs: All pertinent labs within the past 24 hours have been reviewed.  CBC:   Recent Labs   Lab 07/01/23  0407 07/02/23  0441   WBC 8.61 6.16   HGB 14.0 12.8*   HCT 45.8 41.5    255     CMP:   Recent Labs   Lab 07/01/23  0407 07/02/23  0441    140   K 4.7 4.2    104   CO2 25 27   GLU 94 95   BUN 15 10   CREATININE 0.9 1.0   CALCIUM 9.7 8.7   PROT 7.9 6.7   ALBUMIN 3.5 3.1*   BILITOT 0.8 1.0   ALKPHOS 110 153*   * 216*   * 307*   ANIONGAP 9 9       Significant Imaging: I have reviewed all pertinent imaging results/findings within the past 24 hours.

## 2023-07-02 NOTE — ASSESSMENT & PLAN NOTE
- Pt presenting with acute, severe epigastric pain as described above  - Afebrile with no leukocytosis.   - T ronda 0.8. . . Lipase 234.   - T ronda, AST, ALT, and Alk phos uptrending. Lipase now wnl.   - U/S consistent with cholelithiasis with mild wall thickening and mildly dilated CBD measuring 7mm.   - AES consulted, appreciate recs:    -Obtain repeat liver US to assess for any progressive biliary dilatation.    -Okay to continue clear liquid diet for now.    -Will assess weight restrictions for fluoroscopy table prior to making decision regarding EUS or ERCP.    -Cholecystectomy prior to discharge. As an alternative to ERCP, cholangiogram can be performed intra-op.    -Hepatic function panel daily.   - General surgery consulted, appreciate recs

## 2023-07-02 NOTE — ASSESSMENT & PLAN NOTE
- Pt with progressively worsening SOB, STALLINGS, orthopnea, and BLLE edema over years  - Last echo 3 years prior without abnormalities   - Troponin x2 wnl. BNP 13 but unreliable given morbid obesity.   - Echo reviewed and below  - Continue home lasix     Results for orders placed during the hospital encounter of 07/01/23  Echo  Interpretation Summary  · The left ventricle is mildly enlarged with eccentric hypertrophy and normal systolic function. The estimated ejection fraction is 60%.  · Normal right ventricular size with normal right ventricular systolic function.  · Normal left ventricular diastolic function.  · Biatrial enlargement.  · Intermediate central venous pressure (8 mmHg).

## 2023-07-03 ENCOUNTER — TELEPHONE (OUTPATIENT)
Dept: ENDOSCOPY | Facility: HOSPITAL | Age: 53
End: 2023-07-03
Payer: MEDICARE

## 2023-07-03 ENCOUNTER — TELEPHONE (OUTPATIENT)
Dept: INTERNAL MEDICINE | Facility: CLINIC | Age: 53
End: 2023-07-03
Payer: MEDICARE

## 2023-07-03 ENCOUNTER — TELEPHONE (OUTPATIENT)
Dept: BARIATRICS | Facility: CLINIC | Age: 53
End: 2023-07-03
Payer: MEDICARE

## 2023-07-03 VITALS
WEIGHT: 315 LBS | HEART RATE: 67 BPM | HEIGHT: 66 IN | RESPIRATION RATE: 18 BRPM | SYSTOLIC BLOOD PRESSURE: 127 MMHG | BODY MASS INDEX: 50.62 KG/M2 | OXYGEN SATURATION: 97 % | DIASTOLIC BLOOD PRESSURE: 77 MMHG | TEMPERATURE: 98 F

## 2023-07-03 LAB
ALBUMIN SERPL BCP-MCNC: 3.1 G/DL (ref 3.5–5.2)
ALP SERPL-CCNC: 158 U/L (ref 55–135)
ALT SERPL W/O P-5'-P-CCNC: 215 U/L (ref 10–44)
ANION GAP SERPL CALC-SCNC: 7 MMOL/L (ref 8–16)
AST SERPL-CCNC: 85 U/L (ref 10–40)
BASOPHILS # BLD AUTO: 0.02 K/UL (ref 0–0.2)
BASOPHILS NFR BLD: 0.4 % (ref 0–1.9)
BILIRUB SERPL-MCNC: 0.6 MG/DL (ref 0.1–1)
BUN SERPL-MCNC: 8 MG/DL (ref 6–20)
CALCIUM SERPL-MCNC: 8.9 MG/DL (ref 8.7–10.5)
CHLORIDE SERPL-SCNC: 104 MMOL/L (ref 95–110)
CO2 SERPL-SCNC: 28 MMOL/L (ref 23–29)
CREAT SERPL-MCNC: 0.8 MG/DL (ref 0.5–1.4)
DIFFERENTIAL METHOD: ABNORMAL
EOSINOPHIL # BLD AUTO: 0.2 K/UL (ref 0–0.5)
EOSINOPHIL NFR BLD: 3.8 % (ref 0–8)
ERYTHROCYTE [DISTWIDTH] IN BLOOD BY AUTOMATED COUNT: 14.6 % (ref 11.5–14.5)
EST. GFR  (NO RACE VARIABLE): >60 ML/MIN/1.73 M^2
GLUCOSE SERPL-MCNC: 87 MG/DL (ref 70–110)
HCT VFR BLD AUTO: 41.7 % (ref 40–54)
HGB BLD-MCNC: 12.9 G/DL (ref 14–18)
IMM GRANULOCYTES # BLD AUTO: 0.01 K/UL (ref 0–0.04)
IMM GRANULOCYTES NFR BLD AUTO: 0.2 % (ref 0–0.5)
INR PPP: 1.1 (ref 0.8–1.2)
LYMPHOCYTES # BLD AUTO: 1.2 K/UL (ref 1–4.8)
LYMPHOCYTES NFR BLD: 24.9 % (ref 18–48)
MAGNESIUM SERPL-MCNC: 2.1 MG/DL (ref 1.6–2.6)
MCH RBC QN AUTO: 27.3 PG (ref 27–31)
MCHC RBC AUTO-ENTMCNC: 30.9 G/DL (ref 32–36)
MCV RBC AUTO: 88 FL (ref 82–98)
MONOCYTES # BLD AUTO: 0.7 K/UL (ref 0.3–1)
MONOCYTES NFR BLD: 13.1 % (ref 4–15)
NEUTROPHILS # BLD AUTO: 2.9 K/UL (ref 1.8–7.7)
NEUTROPHILS NFR BLD: 57.6 % (ref 38–73)
NRBC BLD-RTO: 0 /100 WBC
PHOSPHATE SERPL-MCNC: 3.1 MG/DL (ref 2.7–4.5)
PLATELET # BLD AUTO: 260 K/UL (ref 150–450)
PMV BLD AUTO: 10.2 FL (ref 9.2–12.9)
POTASSIUM SERPL-SCNC: 3.9 MMOL/L (ref 3.5–5.1)
PROT SERPL-MCNC: 6.6 G/DL (ref 6–8.4)
PROTHROMBIN TIME: 11.7 SEC (ref 9–12.5)
RBC # BLD AUTO: 4.73 M/UL (ref 4.6–6.2)
SODIUM SERPL-SCNC: 139 MMOL/L (ref 136–145)
WBC # BLD AUTO: 4.98 K/UL (ref 3.9–12.7)

## 2023-07-03 PROCEDURE — 83735 ASSAY OF MAGNESIUM: CPT

## 2023-07-03 PROCEDURE — 94660 CPAP INITIATION&MGMT: CPT

## 2023-07-03 PROCEDURE — 96372 THER/PROPH/DIAG INJ SC/IM: CPT

## 2023-07-03 PROCEDURE — 80053 COMPREHEN METABOLIC PANEL: CPT

## 2023-07-03 PROCEDURE — 36415 COLL VENOUS BLD VENIPUNCTURE: CPT

## 2023-07-03 PROCEDURE — 85610 PROTHROMBIN TIME: CPT

## 2023-07-03 PROCEDURE — G0378 HOSPITAL OBSERVATION PER HR: HCPCS

## 2023-07-03 PROCEDURE — 25000003 PHARM REV CODE 250

## 2023-07-03 PROCEDURE — 84100 ASSAY OF PHOSPHORUS: CPT

## 2023-07-03 PROCEDURE — 94761 N-INVAS EAR/PLS OXIMETRY MLT: CPT

## 2023-07-03 PROCEDURE — 85025 COMPLETE CBC W/AUTO DIFF WBC: CPT

## 2023-07-03 PROCEDURE — 63600175 PHARM REV CODE 636 W HCPCS

## 2023-07-03 PROCEDURE — 99239 HOSP IP/OBS DSCHRG MGMT >30: CPT | Mod: ,,,

## 2023-07-03 PROCEDURE — 99900035 HC TECH TIME PER 15 MIN (STAT)

## 2023-07-03 PROCEDURE — 99239 PR HOSPITAL DISCHARGE DAY,>30 MIN: ICD-10-PCS | Mod: ,,,

## 2023-07-03 RX ORDER — ALBUTEROL SULFATE 0.63 MG/3ML
0.63 SOLUTION RESPIRATORY (INHALATION) EVERY 6 HOURS PRN
Qty: 1 EACH | Refills: 11 | Status: SHIPPED | OUTPATIENT
Start: 2023-07-03 | End: 2023-07-07

## 2023-07-03 RX ADMIN — SUCRALFATE 1 G: 1 SUSPENSION ORAL at 12:07

## 2023-07-03 RX ADMIN — LISINOPRIL 2.5 MG: 2.5 TABLET ORAL at 09:07

## 2023-07-03 RX ADMIN — ALUMINUM HYDROXIDE, MAGNESIUM HYDROXIDE, AND SIMETHICONE 30 ML: 200; 200; 20 SUSPENSION ORAL at 07:07

## 2023-07-03 RX ADMIN — PANTOPRAZOLE SODIUM 40 MG: 40 TABLET, DELAYED RELEASE ORAL at 09:07

## 2023-07-03 RX ADMIN — HEPARIN SODIUM 7500 UNITS: 5000 INJECTION INTRAVENOUS; SUBCUTANEOUS at 07:07

## 2023-07-03 RX ADMIN — FUROSEMIDE 40 MG: 40 TABLET ORAL at 09:07

## 2023-07-03 RX ADMIN — SUCRALFATE 1 G: 1 SUSPENSION ORAL at 07:07

## 2023-07-03 RX ADMIN — ALUMINUM HYDROXIDE, MAGNESIUM HYDROXIDE, AND SIMETHICONE 30 ML: 200; 200; 20 SUSPENSION ORAL at 12:07

## 2023-07-03 NOTE — CARE UPDATE
Discussed with staff.    No acute surgical intervention at this time    Given co morbidities and BMI his likelihood of recurrence does not  out weight his high likelihood of complications from surgery.     He can follow-up with the Laparoscopic/Bariatric surgery team out pt for further discussions.     Please call with questions.     Cornelio Byrne MD  Surgery, PGY-3

## 2023-07-03 NOTE — MED STUDENT SUBJECTIVE & OBJECTIVE
"Medical Student Subjective & Objective     Principal Problem: Acute gallstone pancreatitis    Chief Complaint:   Chief Complaint   Patient presents with    Abdominal Pain     Epigastric "pulling" pain x 1 hour. Noticed when he laid down for bed. Associated shortness of breath d/t pain. Denies fever, chest pain, nausea, vomiting.        HPI: Ke Oliver is a 53 y.o. M with HTN, prediabetes, GERD, chronic bronchitis, morbid obesity, and JUDSON who presented to the ED with acute epigastric abdominal pain, which started abruptly at 2300 last night. He reports he was laying back to sleep when he felt a sudden, tearing & pulling epigastric abdominal pain, 11/10 in severity, which radiated to his umbilicus and back. He reports the pain was constant and uncontrolled for roughly 2 hrs and improved to 2/10 without intervention prior to evaluation in the ED. He denies associated fever, chills, CP, SOB, N/V, diarrhea or constipation. Last BM yesterday afternoon and well formed. He reports chronic history of lower extremity edema, SOB, STALLINGS, and three pillow orthopnea over the last three years, which has progressively worsened since that time. He denies exertional chest pain but reports he takes roughly 10 steps before needing to stop for a break to catch his breath. Pt denies palpitations, cough, dysuria, leg pain, weakness, confusion, HA, syncope or recent sick contacts.       In the ED: Hypertensive to 170/87, RR 22, otherwise VSS. Afebrile with no leukocytosis. T ronda 0.8. . . Lipase 234. BNP 13. Troponin x2 wnl. CXR consistent with pulmonary edema. U/S consistent with cholelithiasis with mild wall thickening and mildly dilated CBD measuring 7mm. AES was consulted and the patient was placed in observation for further management.     Hospital Course: Mr. Oliver was placed admitted to hospital medicine with acute gallstone pancreatitis and concerns for choledocholithiasis. AES consulted and planning for ERCP " versus cholangiogram. General surgery consulted for laparoscopic cholecystectomy this admission. Plan to discharge home with PCP, GI, and general surgery follow-up when medically stable.    Interval History: Patient was seen and assessed at bedside this morning. BIJAN LEIGH. On CLD. Reports pain is 0/10; no N/V/D or abdominal pain. No BM since admission. Continue miralax. Liver markers and lipase down trending. AES evaluated patient and recommend IOC. Messaged Gen Surg for plan.    Past Medical History:   Diagnosis Date    Bilateral primary osteoarthritis of knee     Hypertension     Obesity     Sleep apnea        Past Surgical History:   Procedure Laterality Date    ANKLE SURGERY  1978    ankle fracture pinnned    COLONOSCOPY N/A 8/4/2016    COLONOSCOPY;  Surgeon: Carson Mittal MD    HERNIA REPAIR  1999    herniated testicle       Review of patient's allergies indicates:  No Known Allergies    No current facility-administered medications on file prior to encounter.     Current Outpatient Medications on File Prior to Encounter   Medication Sig    acetaminophen (TYLENOL) 325 MG tablet Take 2 tablets (650 mg total) by mouth every 6 (six) hours as needed (arthritis pain).    albuterol (ACCUNEB) 0.63 mg/3 mL Nebu Take 3 mLs (0.63 mg total) by nebulization every 6 (six) hours as needed. Rescue    BREO ELLIPTA 100-25 mcg/dose diskus inhaler INHALE 1 PUFF INTO THE LUNGS ONCE DAILY    furosemide (LASIX) 40 MG tablet TAKE 1 TABLET BY MOUTH TWICE A DAY    JARDIANCE 10 mg tablet TAKE 1 TABLET BY MOUTH EVERY DAY    ketoconazole (NIZORAL) 2 % shampoo APPLY TOPICALLY TWICE A WEEK. APPLY TO SCALP, LATHER, LET SIT 2-5 MINUTES THEN RINSE.    lisinopriL (PRINIVIL,ZESTRIL) 2.5 MG tablet TAKE 1 TABLET BY MOUTH EVERY DAY    nystatin (MYCOSTATIN) powder Apply topically 2 (two) times daily.    omeprazole (PRILOSEC) 40 MG capsule TAKE 1 CAPSULE BY MOUTH EVERY DAY    potassium chloride (MICRO-K) 10 MEQ CpSR TAKE 1 CAPSULE BY MOUTH ONCE  DAILY. WHEN TAKING FUROSEMIDE(LASIX) FOR 60 DOSES    tadalafiL (CIALIS) 20 MG Tab Take 1 tablet (20 mg total) by mouth once daily. Take 1 hour before intercourse    topiramate (TOPAMAX) 200 MG Tab TAKE 1 TABLET BY MOUTH TWICE A DAY     Family History       Problem Relation (Age of Onset)    Cancer Mother          Tobacco Use    Smoking status: Former     Packs/day: 1.00     Years: 17.00     Pack years: 17.00     Types: Cigarettes     Quit date: 2001     Years since quittin.8    Smokeless tobacco: Never   Substance and Sexual Activity    Alcohol use: Yes     Comment: 3 every other week     Drug use: No    Sexual activity: Not Currently     Review of Systems   Constitutional:  Negative for chills and fever.   HENT:  Negative for congestion, rhinorrhea, sinus pressure, sore throat and trouble swallowing.    Eyes:  Negative for pain.   Respiratory:  Positive for shortness of breath (intermittent).    Cardiovascular:  Negative for chest pain, palpitations and leg swelling.   Gastrointestinal:  Negative for abdominal pain, constipation, diarrhea, nausea and vomiting.   Genitourinary:  Negative for difficulty urinating and dysuria.   Musculoskeletal:  Positive for back pain. Negative for arthralgias.   Skin:  Negative for rash.   Neurological:  Negative for dizziness and headaches.   Psychiatric/Behavioral:  Negative for agitation, behavioral problems and confusion.    Objective:     Vital Signs (Most Recent):  Temp: 98 °F (36.7 °C) (23 0811)  Pulse: 63 (23 0811)  Resp: 17 (23 0811)  BP: 136/69 (23 0811)  SpO2: 100 % (23 0820) Vital Signs (24h Range):  Temp:  [97.4 °F (36.3 °C)-98 °F (36.7 °C)] 98 °F (36.7 °C)  Pulse:  [58-91] 63  Resp:  [16-20] 17  SpO2:  [90 %-100 %] 100 %  BP: (122-136)/(61-73) 136/69     Weight: (!) 270.7 kg (596 lb 12.6 oz)  Body mass index is 96.32 kg/m².  No intake or output data in the 24 hours ending 23 1028   Physical Exam  Constitutional:        Appearance: He is obese.   HENT:      Head: Normocephalic and atraumatic.      Nose: Nose normal.      Mouth/Throat:      Mouth: Mucous membranes are moist.   Eyes:      Pupils: Pupils are equal, round, and reactive to light.   Cardiovascular:      Rate and Rhythm: Normal rate and regular rhythm.      Pulses: Normal pulses.      Heart sounds: Normal heart sounds.   Pulmonary:      Effort: Pulmonary effort is normal. No respiratory distress.      Breath sounds: Normal breath sounds.      Comments: Decreased breath sounds bilaterally 2/2 body habitus   Abdominal:      Tenderness: There is no abdominal tenderness. There is no guarding.   Musculoskeletal:      Cervical back: Normal range of motion and neck supple.      Right lower leg: Edema present.      Left lower leg: Edema present.   Neurological:      General: No focal deficit present.      Mental Status: He is alert and oriented to person, place, and time.   Psychiatric:         Mood and Affect: Mood normal.         Behavior: Behavior normal.         Thought Content: Thought content normal.         Judgment: Judgment normal.       Significant Labs: All pertinent labs within the past 24 hours have been reviewed.  Bilirubin:   Recent Labs   Lab 07/01/23  0407 07/02/23 0441 07/03/23  0511   BILITOT 0.8 1.0 0.6     CBC:   Recent Labs   Lab 07/02/23  0441 07/03/23  0511   WBC 6.16 4.98   HGB 12.8* 12.9*   HCT 41.5 41.7    260     CMP:   Recent Labs   Lab 07/02/23  0441 07/03/23  0511    139   K 4.2 3.9    104   CO2 27 28   GLU 95 87   BUN 10 8   CREATININE 1.0 0.8   CALCIUM 8.7 8.9   PROT 6.7 6.6   ALBUMIN 3.1* 3.1*   BILITOT 1.0 0.6   ALKPHOS 153* 158*   * 85*   * 215*   ANIONGAP 9 7*       Significant Imaging: I have reviewed all pertinent imaging results/findings within the past 24 hours.    Medical Student Subjective & Objective

## 2023-07-03 NOTE — TELEPHONE ENCOUNTER
Spoke to pt regarding portal message. Pt states he was in the ED a few days ago. Pt stated ED wanted him to f/u regarding his pancreatitis/gallbladder attack. Informed pt that this was a weight clinic and that his message would be route to the appropriate dept. Understanding stated. All questions and concerns addressed.

## 2023-07-03 NOTE — MED STUDENT ASSESSMENT & PLAN
Acute gallstone pancreatitis  - Pt presenting with acute, severe epigastric pain as described above  - Afebrile with no leukocytosis.   - T ronda 0.6. AST 85. .   - T ronda, AST, ALT, and Alk phos down trending. Lipase wnl.   - U/S consistent with cholelithiasis with mild wall thickening and mildly dilated CBD measuring 7mm.   - AES consulted, appreciate recs:            - IVF with LR, pain control PRN, and NPO with advancement of diet to clears if reporting hunger.            -CMP and INR daily.             -If planning for inpatient cholecystectomy, recommend IOC to evaluate for stones--no plans for EUS/ ERCP at this time given no evidence of CBD dilation and spontaneous improvement of              tranasminases  - General surgery consulted, appreciate recs     Simple chronic bronchitis  - Chronic issue  - On Breo and PRN albuterol at home, continue inpatient      STALLINGS (dyspnea on exertion)  - Pt with progressively worsening SOB, STALLINGS, orthopnea, and BLLE edema over years  - Last echo 3 years prior without abnormalities   - Troponin x2 wnl. BNP 13 but unreliable given morbid obesity.   - Echo reviewed and below  - Continue home lasix      Results for orders placed during the hospital encounter of 07/01/23  Echo  Interpretation Summary  · The left ventricle is mildly enlarged with eccentric hypertrophy and normal systolic function. The estimated ejection fraction is 60%.  · Normal right ventricular size with normal right ventricular systolic function.  · Normal left ventricular diastolic function.  · Biatrial enlargement.  · Intermediate central venous pressure (8 mmHg).     Prediabetes  - Controlled/uncontrolled        Lab Results   Component Value Date     HGBA1C 5.7 (H) 07/01/2023   - Hold home jardiance while inpatient      Morbid obesity with body mass index of 70 and over in adult  Body mass index is 99.31 kg/m². Morbid obesity complicates all aspects of disease management from diagnostic modalities to  treatment. Weight loss encouraged and health benefits explained to patient.  - Has tried ozempic & topamax in the past with no improvement  - Consider amb ref to bariatric surgery at discharge      Essential hypertension  - Elevated on admission   - Latest BP and vitals reviewed  - Continue home meds for HTN: lisinopril 2.5 mg, lasix 40 mg BID  - Goal SBP 120s-140s. Utilize p.r.n. antihypertensives only if patient's BP >180/110 & develop symptoms of worsening CP or SOB.     Obstructive sleep apnea on CPAP  - CPAP qhs & with daytime naps         VTE Risk Mitigation (From admission, onward)           Ordered       heparin (porcine) injection 7,500 Units  Every 8 hours         07/01/23 0809       IP VTE HIGH RISK PATIENT  Once         07/01/23 0809       Place sequential compression device  Until discontinued         07/01/23 0809                    Discharge Planning   LEAH: 7/4/2023     Code Status: Full Code   Is the patient medically ready for discharge?: No    Reason for patient still in hospital (select all that apply): Patient trending condition, Laboratory test, Treatment, Imaging and Consult recommendations        MICHELLE Argueta

## 2023-07-03 NOTE — PLAN OF CARE
CHW unable to schedule the Gastroenterology, Bariatric Surgery and PCP hospital follow ups. A message was sent to the clinics requesting an appointment on the patients behalf. I added this information to the AVS as a reminder for the patient.

## 2023-07-03 NOTE — TELEPHONE ENCOUNTER
----- Message from Danyell Quezada sent at 7/3/2023  1:02 PM CDT -----  Contact: [phone] Patient  Patient needs a Hosp follow up appt with their PCP only.       When is the next available appointment:  07/18/2023    Symptoms:      Discharge date:07/03/2023    Needs to be seen by:07/10/2023    Would you prefer an answer via [facility]?: Call Back Patient please. Thank you     Comments:

## 2023-07-03 NOTE — PLAN OF CARE
[facility]  Discharge Final Note    Primary Care Provider: Marcos Zavala MD    Expected Discharge Date: 7/3/2023    Future Appointments   Date Time Provider Department Center   7/7/2023  1:20 PM Marcos Zavala MD [facility]   7/14/2023  8:40 AM PRE-ADMIT, ENDO -[facility] [address] [facility]     Pt discharged home with no services.  James Jerez, RN,BSN        Final Discharge Note (most recent)       Final Note - 07/03/23 1414          Final Note    Assessment Type Final Discharge Note     Anticipated Discharge Disposition Home or Self Care     Hospital Resources/Appts/Education Provided Provided patient/caregiver with written discharge plan information;Appointments scheduled and added to AVS        Post-Acute Status    Discharge Delays None known at this time                     Important Message from Medicare             Contact [facility]       Gastroenterology    Gastroenterology   [phone]       Next Steps: Follow up    Instructions: A message has been sent to the Gastroenterology clinic, the clinic nurse will contact you to schedule a hospital follow up visit. However, if you do not hear from them within 24 to 48 hours of discharge please call to schedule the appointment.    Bariatric Surgery    Bariatric Surgery  [phone]       Next Steps: Follow up    Instructions: A message has been sent to the Bariatric clinic, the clinic nurse will contact you to schedule a hospital follow up visit. However, if you do not hear from them within 24 to 48 hours of discharge please call to schedule the appointment.    Marcos Zavala MD   Specialty: Internal Medicine   Relationship: PCP - General    [address]  [facility] [address]   Phone: [phone]       Next Steps: Follow up    Instructions: A message has been sent to your PCP and the nurse will contact you to schedule this hospital follow up visit. However, if you do not hear from them within 24 to 48 hours of  discharge please call to schedule the appointment.

## 2023-07-03 NOTE — TREATMENT PLAN
[facility]  Gastroenterology  Progress Note    Patient Name: Ke Oliver  MRN: [id number]  Admission Date: 7/1/2023  Hospital Length of Stay: 0 days    Subjective:     Interval History:  US wnl. LFTs improving spontaneously    No current facility-administered medications on file prior to encounter.     Current Outpatient Medications on File Prior to Encounter   Medication Sig Dispense Refill    acetaminophen (TYLENOL) 325 MG tablet Take 2 tablets (650 mg total) by mouth every 6 (six) hours as needed (arthritis pain).  0    albuterol (ACCUNEB) 0.63 mg/3 mL Nebu Take 3 mLs (0.63 mg total) by nebulization every 6 (six) hours as needed. Rescue 1 Box 11    BREO ELLIPTA 100-25 mcg/dose diskus inhaler INHALE 1 PUFF INTO THE LUNGS ONCE DAILY 180 each 1    furosemide (LASIX) 40 MG tablet TAKE 1 TABLET BY MOUTH TWICE A  tablet 1    JARDIANCE 10 mg tablet TAKE 1 TABLET BY MOUTH EVERY DAY 90 tablet 1    ketoconazole (NIZORAL) 2 % shampoo APPLY TOPICALLY TWICE A WEEK. APPLY TO SCALP, LATHER, LET SIT 2-5 MINUTES THEN RINSE. 120 mL 3    lisinopriL (PRINIVIL,ZESTRIL) 2.5 MG tablet TAKE 1 TABLET BY MOUTH EVERY DAY 90 tablet 3    nystatin (MYCOSTATIN) powder Apply topically 2 (two) times daily. 60 g 5    omeprazole (PRILOSEC) 40 MG capsule TAKE 1 CAPSULE BY MOUTH EVERY DAY 90 capsule 3    potassium chloride (MICRO-K) 10 MEQ CpSR TAKE 1 CAPSULE BY MOUTH ONCE DAILY. WHEN TAKING FUROSEMIDE(LASIX) FOR 60 DOSES 90 capsule 3    tadalafiL (CIALIS) 20 MG Tab Take 1 tablet (20 mg total) by mouth once daily. Take 1 hour before intercourse 10 tablet 11    topiramate (TOPAMAX) 200 MG Tab TAKE 1 TABLET BY MOUTH TWICE A  tablet 1       Review of patient's allergies indicates:  No Known Allergies      Objective:     Vitals:    07/03/23 0811   BP: 136/69   Pulse: 63   Resp: 17   Temp: 98 °F (36.7 °C)       Significant Labs:  Recent Labs   Lab 07/01/23  0407 07/02/23  0441 07/03/23  0511   HGB 14.0 12.8* 12.9*        Lab Results   Component Value Date    WBC 4.98 07/03/2023    HGB 12.9 (L) 07/03/2023    HCT 41.7 07/03/2023    MCV 88 07/03/2023     07/03/2023       Lab Results   Component Value Date     07/03/2023    K 3.9 07/03/2023     07/03/2023    CO2 28 07/03/2023    BUN 8 07/03/2023    CREATININE 0.8 07/03/2023    CALCIUM 8.9 07/03/2023    ANIONGAP 7 (L) 07/03/2023    ESTGFRAFRICA >60.0 01/25/2022    EGFRNONAA >60.0 01/25/2022       Lab Results   Component Value Date     (H) 07/03/2023    AST 85 (H) 07/03/2023    ALKPHOS 158 (H) 07/03/2023    BILITOT 0.6 07/03/2023       Lab Results   Component Value Date    INR 1.1 07/03/2023    INR 1.1 07/02/2023    INR 1.0 09/27/2019       Significant Imaging:  Reviewed pertinent radiology findings.       Assessment/Plan:     This is a 53 year old male with PMH significant for HTN, obesity (on OZEMPIC), and JUDSON who was admitted to [facility] on 07/01 for management of gallstone pancreatitis without associated cholangitis. He is asymptomatic currently following receiving GI cocktail on arrival.     MRI and CT scan not feasible given BMI    Repeat US w/o biliary dilation, LFTs improving spontaneously     Problem List:   #1. Gallstone pancreatitis.   #2. Cholelithiasis.   #3. Obesity  #4. JUDSON     Recommendations:    -IVF with LR, pain control PRN, and NPO with advancement of diet to clears if reporting hunger.   -CMP and INR daily.   -If planning for inpatient cholecystectomy, recommend IOC to evaluate for stones--no plans for EUS/ ERCP at this time given no evidence of CBD dilation and spontaneous improvement of tranasminases       Thank you for involving us in the care of Ke Oliver. Please call with any additional questions, concerns or changes in the patient's clinical status. We will continue to follow. .    Anson Self MD  Gastroenterology Fellow PGY IV   [facility]

## 2023-07-03 NOTE — DISCHARGE INSTRUCTIONS
It has been a pleasure caring for you, and I hope you continue to feel better and stronger every day! Please do not hesitate to reach out to me with any questions or concerns.     Sita Dominguez PA-C  [facility]  [phone]

## 2023-07-03 NOTE — MEDICAL/APP STUDENT
"[facility]  Progress Note    Patient Name: Ke Oliver  MRN: [id number]  Patient Class: OP- Observation   Admission Date: 7/1/2023  Length of Stay: 0 days  Attending Physician: Randall Aviles MD  Primary Care Provider: Marcos Zavala MD    Subjective:     Principal Problem: Acute gallstone pancreatitis    Chief Complaint:   Chief Complaint   Patient presents with    Abdominal Pain     Epigastric "pulling" pain x 1 hour. Noticed when he laid down for bed. Associated shortness of breath d/t pain. Denies fever, chest pain, nausea, vomiting.        HPI: Ke Oliver is a 53 y.o. M with HTN, prediabetes, GERD, chronic bronchitis, morbid obesity, and JUDSON who presented to the ED with acute epigastric abdominal pain, which started abruptly at 2300 last night. He reports he was laying back to sleep when he felt a sudden, tearing & pulling epigastric abdominal pain, 11/10 in severity, which radiated to his umbilicus and back. He reports the pain was constant and uncontrolled for roughly 2 hrs and improved to 2/10 without intervention prior to evaluation in the ED. He denies associated fever, chills, CP, SOB, N/V, diarrhea or constipation. Last BM yesterday afternoon and well formed. He reports chronic history of lower extremity edema, SOB, STALLINGS, and three pillow orthopnea over the last three years, which has progressively worsened since that time. He denies exertional chest pain but reports he takes roughly 10 steps before needing to stop for a break to catch his breath. Pt denies palpitations, cough, dysuria, leg pain, weakness, confusion, HA, syncope or recent sick contacts.       In the ED: Hypertensive to 170/87, RR 22, otherwise VSS. Afebrile with no leukocytosis. T ronda 0.8. . . Lipase 234. BNP 13. Troponin x2 wnl. CXR consistent with pulmonary edema. U/S consistent with cholelithiasis with mild wall thickening and mildly dilated CBD measuring 7mm. AES was consulted and the patient " was placed in observation for further management.     Hospital Course: Mr. Oliver was placed admitted to hospital medicine with acute gallstone pancreatitis and concerns for choledocholithiasis. AES consulted and planning for ERCP versus cholangiogram. General surgery consulted for laparoscopic cholecystectomy this admission. Plan to discharge home with PCP, GI, and general surgery follow-up when medically stable.    Interval History: Patient was seen and assessed at bedside this morning. BIJAN LEIGH. On CLD. Reports pain is 0/10; no N/V/D or abdominal pain. No BM since admission. Continue miralax. Liver markers and lipase down trending. AES evaluated patient and recommend IOC. Messaged Gen Surg for plan.    Past Medical History:   Diagnosis Date    Bilateral primary osteoarthritis of knee     Hypertension     Obesity     Sleep apnea        Past Surgical History:   Procedure Laterality Date    ANKLE SURGERY  1978    ankle fracture pinnned    COLONOSCOPY N/A 8/4/2016    COLONOSCOPY;  Surgeon: Carson Mittal MD    HERNIA REPAIR  1999    herniated testicle       Review of patient's allergies indicates:  No Known Allergies    No current facility-administered medications on file prior to encounter.     Current Outpatient Medications on File Prior to Encounter   Medication Sig    acetaminophen (TYLENOL) 325 MG tablet Take 2 tablets (650 mg total) by mouth every 6 (six) hours as needed (arthritis pain).    albuterol (ACCUNEB) 0.63 mg/3 mL Nebu Take 3 mLs (0.63 mg total) by nebulization every 6 (six) hours as needed. Rescue    BREO ELLIPTA 100-25 mcg/dose diskus inhaler INHALE 1 PUFF INTO THE LUNGS ONCE DAILY    furosemide (LASIX) 40 MG tablet TAKE 1 TABLET BY MOUTH TWICE A DAY    JARDIANCE 10 mg tablet TAKE 1 TABLET BY MOUTH EVERY DAY    ketoconazole (NIZORAL) 2 % shampoo APPLY TOPICALLY TWICE A WEEK. APPLY TO SCALP, LATHER, LET SIT 2-5 MINUTES THEN RINSE.    lisinopriL (PRINIVIL,ZESTRIL) 2.5 MG tablet TAKE 1 TABLET  BY MOUTH EVERY DAY    nystatin (MYCOSTATIN) powder Apply topically 2 (two) times daily.    omeprazole (PRILOSEC) 40 MG capsule TAKE 1 CAPSULE BY MOUTH EVERY DAY    potassium chloride (MICRO-K) 10 MEQ CpSR TAKE 1 CAPSULE BY MOUTH ONCE DAILY. WHEN TAKING FUROSEMIDE(LASIX) FOR 60 DOSES    tadalafiL (CIALIS) 20 MG Tab Take 1 tablet (20 mg total) by mouth once daily. Take 1 hour before intercourse    topiramate (TOPAMAX) 200 MG Tab TAKE 1 TABLET BY MOUTH TWICE A DAY     Family History       Problem Relation (Age of Onset)    Cancer Mother          Tobacco Use    Smoking status: Former     Packs/day: 1.00     Years: 17.00     Pack years: 17.00     Types: Cigarettes     Quit date: 2001     Years since quittin.8    Smokeless tobacco: Never   Substance and Sexual Activity    Alcohol use: Yes     Comment: 3 every other week     Drug use: No    Sexual activity: Not Currently     Review of Systems   Constitutional:  Negative for chills and fever.   HENT:  Negative for congestion, rhinorrhea, sinus pressure, sore throat and trouble swallowing.    Eyes:  Negative for pain.   Respiratory:  Positive for shortness of breath (intermittent).    Cardiovascular:  Negative for chest pain, palpitations and leg swelling.   Gastrointestinal:  Negative for abdominal pain, constipation, diarrhea, nausea and vomiting.   Genitourinary:  Negative for difficulty urinating and dysuria.   Musculoskeletal:  Positive for back pain. Negative for arthralgias.   Skin:  Negative for rash.   Neurological:  Negative for dizziness and headaches.   Psychiatric/Behavioral:  Negative for agitation, behavioral problems and confusion.    Objective:     Vital Signs (Most Recent):  Temp: 98 °F (36.7 °C) (23)  Pulse: 63 (23 08)  Resp: 17 (23)  BP: 136/69 (23 08)  SpO2: 100 % (23) Vital Signs (24h Range):  Temp:  [97.4 °F (36.3 °C)-98 °F (36.7 °C)] 98 °F (36.7 °C)  Pulse:  [58-91] 63  Resp:  [16-20]  17  SpO2:  [90 %-100 %] 100 %  BP: (122-136)/(61-73) 136/69     Weight: (!) 270.7 kg (596 lb 12.6 oz)  Body mass index is 96.32 kg/m².  No intake or output data in the 24 hours ending 07/03/23 1028   Physical Exam  Constitutional:       Appearance: He is obese.   HENT:      Head: Normocephalic and atraumatic.      Nose: Nose normal.      Mouth/Throat:      Mouth: Mucous membranes are moist.   Eyes:      Pupils: Pupils are equal, round, and reactive to light.   Cardiovascular:      Rate and Rhythm: Normal rate and regular rhythm.      Pulses: Normal pulses.      Heart sounds: Normal heart sounds.   Pulmonary:      Effort: Pulmonary effort is normal. No respiratory distress.      Breath sounds: Normal breath sounds.      Comments: Decreased breath sounds bilaterally 2/2 body habitus   Abdominal:      Tenderness: There is no abdominal tenderness. There is no guarding.   Musculoskeletal:      Cervical back: Normal range of motion and neck supple.      Right lower leg: Edema present.      Left lower leg: Edema present.   Neurological:      General: No focal deficit present.      Mental Status: He is alert and oriented to person, place, and time.   Psychiatric:         Mood and Affect: Mood normal.         Behavior: Behavior normal.         Thought Content: Thought content normal.         Judgment: Judgment normal.       Significant Labs: All pertinent labs within the past 24 hours have been reviewed.  Bilirubin:   Recent Labs   Lab 07/01/23  0407 07/02/23  0441 07/03/23  0511   BILITOT 0.8 1.0 0.6     CBC:   Recent Labs   Lab 07/02/23  0441 07/03/23  0511   WBC 6.16 4.98   HGB 12.8* 12.9*   HCT 41.5 41.7    260     CMP:   Recent Labs   Lab 07/02/23  0441 07/03/23  0511    139   K 4.2 3.9    104   CO2 27 28   GLU 95 87   BUN 10 8   CREATININE 1.0 0.8   CALCIUM 8.7 8.9   PROT 6.7 6.6   ALBUMIN 3.1* 3.1*   BILITOT 1.0 0.6   ALKPHOS 153* 158*   * 85*   * 215*   ANIONGAP 9 7*       Significant  Imaging: I have reviewed all pertinent imaging results/findings within the past 24 hours.        Assessment/Plan:      Acute gallstone pancreatitis  - Pt presenting with acute, severe epigastric pain as described above  - Afebrile with no leukocytosis.   - T ronda 0.6. AST 85. .   - T ronda, AST, ALT, and Alk phos down trending. Lipase wnl.   - U/S consistent with cholelithiasis with mild wall thickening and mildly dilated CBD measuring 7mm.   - AES consulted, appreciate recs:            - IVF with LR, pain control PRN, and NPO with advancement of diet to clears if reporting hunger.            -CMP and INR daily.             -If planning for inpatient cholecystectomy, recommend IOC to evaluate for stones--no plans for EUS/ ERCP at this time given no evidence of CBD dilation and spontaneous improvement of              tranasminases  - General surgery consulted, appreciate recs     Simple chronic bronchitis  - Chronic issue  - On Breo and PRN albuterol at home, continue inpatient      STALLINGS (dyspnea on exertion)  - Pt with progressively worsening SOB, STALLINGS, orthopnea, and BLLE edema over years  - Last echo 3 years prior without abnormalities   - Troponin x2 wnl. BNP 13 but unreliable given morbid obesity.   - Echo reviewed and below  - Continue home lasix      Results for orders placed during the hospital encounter of 07/01/23  Echo  Interpretation Summary  · The left ventricle is mildly enlarged with eccentric hypertrophy and normal systolic function. The estimated ejection fraction is 60%.  · Normal right ventricular size with normal right ventricular systolic function.  · Normal left ventricular diastolic function.  · Biatrial enlargement.  · Intermediate central venous pressure (8 mmHg).     Prediabetes  - Controlled/uncontrolled        Lab Results   Component Value Date     HGBA1C 5.7 (H) 07/01/2023   - Hold home jardiance while inpatient      Morbid obesity with body mass index of 70 and over in adult  Body  mass index is 99.31 kg/m². Morbid obesity complicates all aspects of disease management from diagnostic modalities to treatment. Weight loss encouraged and health benefits explained to patient.  - Has tried ozempic & topamax in the past with no improvement  - Consider amb ref to bariatric surgery at discharge      Essential hypertension  - Elevated on admission   - Latest BP and vitals reviewed  - Continue home meds for HTN: lisinopril 2.5 mg, lasix 40 mg BID  - Goal SBP 120s-140s. Utilize p.r.n. antihypertensives only if patient's BP >180/110 & develop symptoms of worsening CP or SOB.     Obstructive sleep apnea on CPAP  - CPAP qhs & with daytime naps         VTE Risk Mitigation (From admission, onward)           Ordered       heparin (porcine) injection 7,500 Units  Every 8 hours         07/01/23 0809       IP VTE HIGH RISK PATIENT  Once         07/01/23 0809       Place sequential compression device  Until discontinued         07/01/23 0809                    Discharge Planning   LEAH: 7/4/2023     Code Status: Full Code   Is the patient medically ready for discharge?: No    Reason for patient still in hospital (select all that apply): Patient trending condition, Laboratory test, Treatment, Imaging and Consult recommendations        HENNY ArguetaS                      Active Diagnoses:    Diagnosis Date Noted POA    PRINCIPAL PROBLEM:  Acute gallstone pancreatitis [K85.10] 07/01/2023 Yes    Simple chronic bronchitis [J41.0] 04/08/2020 Yes    STALLINGS (dyspnea on exertion) [R06.09] 09/27/2019 Yes    Prediabetes [R73.03] 11/30/2016 Yes    Essential hypertension [I10] 04/17/2014 Yes    Morbid obesity with body mass index of 70 and over in adult [E66.01, Z68.45] 04/17/2014 Not Applicable     Chronic    Obstructive sleep apnea on CPAP [G47.33, Z99.89] 03/27/2014 Not Applicable      Problems Resolved During this Admission:    Diagnosis Date Noted Date Resolved POA    BPH with urinary obstruction [N40.1, N13.8]  02/17/2022 07/01/2023 Yes     VTE Risk Mitigation (From admission, onward)           Ordered     heparin (porcine) injection 7,500 Units  Every 8 hours         07/01/23 0809     IP VTE HIGH RISK PATIENT  Once         07/01/23 0809     Place sequential compression device  Until discontinued         07/01/23 0809                       [facility]

## 2023-07-03 NOTE — PLAN OF CARE
[facility]  Discharge Assessment    Primary Care Provider: Marcos Zavala MD     Discharge Assessment (most recent)       BRIEF DISCHARGE ASSESSMENT - 07/03/23 1028          Discharge Planning    Assessment Type Discharge Planning Brief Assessment     Resource/Environmental Concerns none     Support Systems Children;Family members     Equipment Currently Used at Home CPAP     Current Living Arrangements home     Patient/Family Anticipates Transition to home     Patient/Family Anticipated Services at Transition none     DME Needed Upon Discharge  none     Discharge Plan A Home     Discharge Plan B Home        Physical Activity    On average, how many days per week do you engage in moderate to strenuous exercise (like a brisk walk)? 0 days     On average, how many minutes do you engage in exercise at this level? 0 min        Housing Stability    In the last 12 months, was there a time when you were not able to pay the mortgage or rent on time? No     In the last 12 months, how many places have you lived? 1     In the last 12 months, was there a time when you did not have a steady place to sleep or slept in a shelter (including now)? No        Transportation Needs    In the past 12 months, has lack of transportation kept you from medical appointments or from getting medications? No     In the past 12 months, has lack of transportation kept you from meetings, work, or from getting things needed for daily living? No                 Pt is a 53 y.o. male admitted with acute gallstone pancreatitis. He has a PMH of morbid obesity, HTN and Bronchitis. He lives with his daughter in a single story house with a ramp. He is independent with his ADLs and IADls. He will have a ride home. [facility] Discharge Packet given to patient and/or family with understanding verbalized.   name and number and estimated discharge date written on white board in patient's room with request to call for any questions or  concerns.  Will continue to follow for needs.  James Jerez RN,BSN

## 2023-07-03 NOTE — DISCHARGE SUMMARY
[facility] [address] [place] [address] Medicine  Discharge Summary      Patient Name: Ke Oliver  MRN: [id number]  GILBERT: [id number]  Patient Class: OP- Observation  Admission Date: 7/1/2023  Hospital Length of Stay: 0 days  Discharge Date and Time: 7/3/2023  3:56 PM  Attending Physician: Griselda att. providers found   Discharging Provider: Sita Dominguez PA-C  Primary Care Provider: Marcos Zavala MD  [facility] Medicine Team: [facility] [place] [place] [facility]OSP MED  Sita Dominguez PA-C  Primary Care Team: [facility] MED     HPI:   Ke Oliver is a 53 y.o. M with HTN, prediabetes, GERD, chronic bronchitis, morbid obesity, and JUDSON who presented to the ED with acute epigastric abdominal pain, which started abruptly at 2300 last night. He reports he was laying back to sleep when he felt a sudden, tearing & pulling epigastric abdominal pain, 11/10 in severity, which radiated to his umbilicus and back. He reports the pain was constant and uncontrolled for roughly 2 hrs and improved to 2/10 without intervention prior to evaluation in the ED. He denies associated fever, chills, CP, SOB, N/V, diarrhea or constipation. Last BM yesterday afternoon and well formed. He reports chronic history of lower extremity edema, SOB, STALLINGS, and three pillow orthopnea over the last three years, which has progressively worsened since that time. He denies exertional chest pain but reports he takes roughly 10 steps before needing to stop for a break to catch his breath. Pt denies palpitations, cough, dysuria, leg pain, weakness, confusion, HA, syncope or recent sick contacts.       In the ED: Hypertensive to 170/87, RR 22, otherwise VSS. Afebrile with no leukocytosis. T ronda 0.8. . . Lipase 234. BNP 13. Troponin x2 wnl. CXR consistent with pulmonary edema. U/S consistent with cholelithiasis with mild wall thickening and mildly dilated CBD measuring 7mm. AES was consulted and the patient was placed in observation for further management.       * No surgery  found *      Hospital Course:   Mr. Oliver was admitted to hospital medicine with acute gallstone pancreatitis. AES consulted and repeat u/s revealed improving CBD dilation, no AES intervention warranted. Lipase WNL and liver enzymes are improving. General surgery consulted; no surgical interventions recommended at this time. Instead, recommended outpatient follow up with bariatric surgery given risk associated with morbid obesity. On my evaluation this morning, the patient reports feeling well and is eager to discharge home. All questions were answered. Patient acknowledged understanding of discharge instructions and feels safe to discharge home. Patient was discharged on 7/3/2023 in stable condition with PCP, GI, and bariatric surgery follow-ups. Return precautions given.        Goals of Care Treatment Preferences:  Code Status: Full Code      Consults:   Consults (From admission, onward)        Status Ordering Provider     Inpatient consult to PICC team ([facility])  Once        Provider:  (Not yet assigned)    Completed ISSA ARGUETA     Inpatient consult to General Surgery  Once        Provider:  (Not yet assigned)    Completed SAM VENTURA     Inpatient consult to [facility] ([facility])  Once        Provider:  (Not yet assigned)    Completed FANY HUNG          No new Assessment & Plan notes have been filed under this hospital service since the last note was generated.  Service: Hospital Medicine    Final Active Diagnoses:    Diagnosis Date Noted POA    PRINCIPAL PROBLEM:  Acute gallstone pancreatitis [K85.10] 07/01/2023 Yes    Simple chronic bronchitis [J41.0] 04/08/2020 Yes    STALLINGS (dyspnea on exertion) [R06.09] 09/27/2019 Yes    Prediabetes [R73.03] 11/30/2016 Yes    Essential hypertension [I10] 04/17/2014 Yes    Morbid obesity with body mass index of 70 and over in adult [E66.01, Z68.45] 04/17/2014 Not Applicable     Chronic    Obstructive sleep apnea on CPAP [G47.33, Z99.89]  03/27/2014 Not Applicable      Problems Resolved During this Admission:    Diagnosis Date Noted Date Resolved POA    BPH with urinary obstruction [N40.1, N13.8] 02/17/2022 07/01/2023 Yes       Discharged Condition: good    Disposition: Home or Self Care    Follow Up:   Follow-up Information     Gastroenterology Follow up.    Why: A message has been sent to the Gastroenterology clinic, the clinic nurse will contact you to schedule a hospital follow up visit. However, if you do not hear from them within 24 to 48 hours of discharge please call to schedule the appointment.  Contact information:  Gastroenterology   [phone]           Bariatric Surgery Follow up.    Why: A message has been sent to the Bariatric clinic, the clinic nurse will contact you to schedule a hospital follow up visit. However, if you do not hear from them within 24 to 48 hours of discharge please call to schedule the appointment.  Contact information:  Bariatric Surgery  [phone]           Marcos Zavala MD Follow up.    Specialty: Internal Medicine  Why: A message has been sent to your PCP and the nurse will contact you to schedule this hospital follow up visit. However, if you do not hear from them within 24 to 48 hours of discharge please call to schedule the appointment.  Contact information:  [address]  [facility] [address]  [phone]                       Patient Instructions:      Ambulatory referral/consult to Bariatric Surgery   Standing Status: Future   Referral Priority: Urgent Referral Type: Consultation   Referral Reason: Specialty Services Required   Requested Specialty: Bariatrics   Number of Visits Requested: 1     Ambulatory referral/consult to Internal Medicine   Standing Status: Future   Referral Priority: Urgent Referral Type: Consultation   Referral Reason: Specialty Services Required   Requested Specialty: Internal Medicine   Number of Visits Requested: 1     Ambulatory referral/consult to Gastroenterology    Standing Status: Future   Referral Priority: Routine Referral Type: Consultation   Referral Reason: Specialty Services Required   Requested Specialty: Gastroenterology   Number of Visits Requested: 1     Notify your health care provider if you experience any of the following:  temperature >100.4     Notify your health care provider if you experience any of the following:  severe uncontrolled pain     Notify your health care provider if you experience any of the following:  persistent nausea and vomiting or diarrhea     Activity as tolerated       Significant Diagnostic Studies: N/A    Pending Diagnostic Studies:     None         Medications:  Reconciled Home Medications:      Medication List      CONTINUE taking these medications    acetaminophen 325 MG tablet  Commonly known as: TYLENOL  Take 2 tablets (650 mg total) by mouth every 6 (six) hours as needed (arthritis pain).     albuterol 0.63 mg/3 mL Nebu  Commonly known as: ACCUNEB  Take 3 mLs (0.63 mg total) by nebulization every 6 (six) hours as needed. Rescue     BREO ELLIPTA 100-25 mcg/dose diskus inhaler  Generic drug: fluticasone furoate-vilanteroL  INHALE 1 PUFF INTO THE LUNGS ONCE DAILY     furosemide 40 MG tablet  Commonly known as: LASIX  TAKE 1 TABLET BY MOUTH TWICE A DAY     JARDIANCE 10 mg tablet  Generic drug: empagliflozin  TAKE 1 TABLET BY MOUTH EVERY DAY     ketoconazole 2 % shampoo  Commonly known as: NIZORAL  APPLY TOPICALLY TWICE A WEEK. APPLY TO SCALP, LATHER, LET SIT 2-5 MINUTES THEN RINSE.     lisinopriL 2.5 MG tablet  Commonly known as: PRINIVIL,ZESTRIL  TAKE 1 TABLET BY MOUTH EVERY DAY     nystatin powder  Commonly known as: MYCOSTATIN  Apply topically 2 (two) times daily.     omeprazole 40 MG capsule  Commonly known as: PRILOSEC  TAKE 1 CAPSULE BY MOUTH EVERY DAY     potassium chloride 10 MEQ Cpsr  Commonly known as: MICRO-K  TAKE 1 CAPSULE BY MOUTH ONCE DAILY. WHEN TAKING FUROSEMIDE(LASIX) FOR 60 DOSES     tadalafiL 20 MG Tab  Commonly  known as: CIALIS  Take 1 tablet (20 mg total) by mouth once daily. Take 1 hour before intercourse     topiramate 200 MG Tab  Commonly known as: TOPAMAX  TAKE 1 TABLET BY MOUTH TWICE A DAY            Indwelling Lines/Drains at time of discharge:   Lines/Drains/Airways     None                 Time spent on the discharge of patient: 36 minutes         Sita Dominguez PA-C  Department of Hospital Medicine  [facility] - Observation 11H

## 2023-07-03 NOTE — TELEPHONE ENCOUNTER
----- Message from Jacinda Pandya sent at 7/3/2023  1:16 PM CDT -----  Contact: [phone]  the patient is calling to get scheduled for a appt.  Pt access tried but no appts are available.  the patient can be reached at.   [phone]

## 2023-07-03 NOTE — TELEPHONE ENCOUNTER
----- Message from Abby Hernandez sent at 7/3/2023  1:21 PM CDT -----  Regarding: Appr  Contact: pt [phone]  Dawn w/[facility] is calling to schedule hospital follow up appt, dx: K85.10 (ICD-10-CM) - Gallstone pancreatitis, please call @[phone]

## 2023-07-04 NOTE — TELEPHONE ENCOUNTER
----- Message from Jacinda Pandya sent at 7/3/2023  1:16 PM CDT -----  Contact: [phone]  the patient is calling to get scheduled for a appt.  Pt access tried but no appts are available.  the patient can be reached at.   [phone]

## 2023-07-05 ENCOUNTER — TELEPHONE (OUTPATIENT)
Dept: BARIATRICS | Facility: CLINIC | Age: 53
End: 2023-07-05
Payer: MEDICARE

## 2023-07-06 ENCOUNTER — TELEPHONE (OUTPATIENT)
Dept: SURGERY | Facility: CLINIC | Age: 53
End: 2023-07-06
Payer: MEDICARE

## 2023-07-06 NOTE — TELEPHONE ENCOUNTER
I called the patient and scheduled an appt for him to see Dr. Priya Santiago.  He is also scheduled to meet with the  in Bariatrics.  The patient is aware of both of these appts.     ----- Message from Kaley Pierre MA sent at 7/3/2023  5:10 PM CDT -----  Regarding: FW: Appr  Contact: pt [phone]  Call patient to schedule appointment.    ----- Message -----  From: Dee Deutsch RN  Sent: 7/3/2023   2:17 PM CDT  To: [facility] General Surgery Clinical Support  Subject: FW: Appr                                         Please advise.   ----- Message -----  From: Abby Hernandez  Sent: 7/3/2023   1:22 PM CDT  To: [facility] Staff  Subject: Appr                                             Dawn w/[facility] is calling to schedule hospital follow up appt, dx: K85.10 (ICD-10-CM) - Gallstone pancreatitis, please call @[phone]

## 2023-07-07 ENCOUNTER — OFFICE VISIT (OUTPATIENT)
Dept: INTERNAL MEDICINE | Facility: CLINIC | Age: 53
End: 2023-07-07
Payer: MEDICARE

## 2023-07-07 ENCOUNTER — TELEPHONE (OUTPATIENT)
Dept: SURGERY | Facility: CLINIC | Age: 53
End: 2023-07-07
Payer: MEDICARE

## 2023-07-07 VITALS
OXYGEN SATURATION: 97 % | SYSTOLIC BLOOD PRESSURE: 123 MMHG | HEIGHT: 66 IN | BODY MASS INDEX: 50.62 KG/M2 | DIASTOLIC BLOOD PRESSURE: 61 MMHG | HEART RATE: 73 BPM | WEIGHT: 315 LBS

## 2023-07-07 DIAGNOSIS — G47.33 OBSTRUCTIVE SLEEP APNEA ON CPAP: ICD-10-CM

## 2023-07-07 DIAGNOSIS — M17.0 BILATERAL PRIMARY OSTEOARTHRITIS OF KNEE: ICD-10-CM

## 2023-07-07 DIAGNOSIS — E66.01 MORBID OBESITY WITH BODY MASS INDEX OF 70 AND OVER IN ADULT: Chronic | ICD-10-CM

## 2023-07-07 DIAGNOSIS — R73.03 PREDIABETES: ICD-10-CM

## 2023-07-07 DIAGNOSIS — K85.10 ACUTE GALLSTONE PANCREATITIS: ICD-10-CM

## 2023-07-07 DIAGNOSIS — I10 ESSENTIAL HYPERTENSION: ICD-10-CM

## 2023-07-07 DIAGNOSIS — Z09 HOSPITAL DISCHARGE FOLLOW-UP: Primary | ICD-10-CM

## 2023-07-07 PROCEDURE — 3078F PR MOST RECENT DIASTOLIC BLOOD PRESSURE < 80 MM HG: ICD-10-PCS | Mod: CPTII,S$GLB,, | Performed by: INTERNAL MEDICINE

## 2023-07-07 PROCEDURE — 1159F MED LIST DOCD IN RCRD: CPT | Mod: CPTII,S$GLB,, | Performed by: INTERNAL MEDICINE

## 2023-07-07 PROCEDURE — 3008F BODY MASS INDEX DOCD: CPT | Mod: CPTII,S$GLB,, | Performed by: INTERNAL MEDICINE

## 2023-07-07 PROCEDURE — 99214 OFFICE O/P EST MOD 30 MIN: CPT | Mod: S$GLB,,, | Performed by: INTERNAL MEDICINE

## 2023-07-07 PROCEDURE — 3044F HG A1C LEVEL LT 7.0%: CPT | Mod: CPTII,S$GLB,, | Performed by: INTERNAL MEDICINE

## 2023-07-07 PROCEDURE — 1159F PR MEDICATION LIST DOCUMENTED IN MEDICAL RECORD: ICD-10-PCS | Mod: CPTII,S$GLB,, | Performed by: INTERNAL MEDICINE

## 2023-07-07 PROCEDURE — 3044F PR MOST RECENT HEMOGLOBIN A1C LEVEL <7.0%: ICD-10-PCS | Mod: CPTII,S$GLB,, | Performed by: INTERNAL MEDICINE

## 2023-07-07 PROCEDURE — 3074F SYST BP LT 130 MM HG: CPT | Mod: CPTII,S$GLB,, | Performed by: INTERNAL MEDICINE

## 2023-07-07 PROCEDURE — 3074F PR MOST RECENT SYSTOLIC BLOOD PRESSURE < 130 MM HG: ICD-10-PCS | Mod: CPTII,S$GLB,, | Performed by: INTERNAL MEDICINE

## 2023-07-07 PROCEDURE — 99999 PR PBB SHADOW E&M-EST. PATIENT-LVL III: ICD-10-PCS | Mod: PBBFAC,,, | Performed by: INTERNAL MEDICINE

## 2023-07-07 PROCEDURE — 99999 PR PBB SHADOW E&M-EST. PATIENT-LVL III: CPT | Mod: PBBFAC,,, | Performed by: INTERNAL MEDICINE

## 2023-07-07 PROCEDURE — 3008F PR BODY MASS INDEX (BMI) DOCUMENTED: ICD-10-PCS | Mod: CPTII,S$GLB,, | Performed by: INTERNAL MEDICINE

## 2023-07-07 PROCEDURE — 99214 PR OFFICE/OUTPT VISIT, EST, LEVL IV, 30-39 MIN: ICD-10-PCS | Mod: S$GLB,,, | Performed by: INTERNAL MEDICINE

## 2023-07-07 PROCEDURE — 3078F DIAST BP <80 MM HG: CPT | Mod: CPTII,S$GLB,, | Performed by: INTERNAL MEDICINE

## 2023-07-07 NOTE — PROGRESS NOTES
Subjective:       Patient ID: Ke Oliver is a 53 y.o. male.    Chief Complaint: Follow-up    HPI  Ke Oliver is a 53 y.o. year old male with HTN, prediabetes, GERD, chronic bronchitis, morbid obesity, and JUDSON established pt presents for hospital follow up for gallstone pancreatitis.     Epigastric abdominal pain prompting ER visit. Admitted, imaging done, pain controlled, AES consulted, no intervention warranted.    Has f/u with general surgery, bariatrics.     Review of Systems   Constitutional:  Negative for activity change, appetite change, chills, fatigue, fever and unexpected weight change.   HENT:  Negative for congestion, rhinorrhea and sore throat.    Eyes:  Negative for visual disturbance.   Respiratory:  Negative for shortness of breath.    Cardiovascular:  Negative for chest pain.   Gastrointestinal:  Positive for abdominal pain (resolved). Negative for diarrhea, nausea and vomiting.   Genitourinary:  Negative for difficulty urinating and dysuria.   Musculoskeletal:  Negative for arthralgias, back pain and myalgias.   Skin:  Negative for color change and rash.   Neurological:  Negative for dizziness, weakness and headaches.       Past Medical History:   Diagnosis Date    Bilateral primary osteoarthritis of knee     Hypertension     Obesity     Sleep apnea         Prior to Admission medications    Medication Sig Start Date End Date Taking? Authorizing Provider   acetaminophen (TYLENOL) 325 MG tablet Take 2 tablets (650 mg total) by mouth every 6 (six) hours as needed (arthritis pain). 9/29/19   Jaylan Allen MD   albuterol (ACCUNEB) 0.63 mg/3 mL Nebu Take 3 mLs (0.63 mg total) by nebulization every 6 (six) hours as needed. Rescue 7/3/23 7/2/24  iSta Dominguez PA-C   BREO ELLIPTA 100-25 mcg/dose diskus inhaler INHALE 1 PUFF INTO THE LUNGS ONCE DAILY 8/26/20   Corey Craig MD   furosemide (LASIX) 40 MG tablet TAKE 1 TABLET BY MOUTH TWICE A DAY 4/20/22   Marcos Zavala MD   JARDIANCE 10 mg  "tablet TAKE 1 TABLET BY MOUTH EVERY DAY 4/28/23   Marcos Zavala MD   ketoconazole (NIZORAL) 2 % shampoo APPLY TOPICALLY TWICE A WEEK. APPLY TO SCALP, LATHER, LET SIT 2-5 MINUTES THEN RINSE. 11/7/22   Marcos Zavala MD   lisinopriL (PRINIVIL,ZESTRIL) 2.5 MG tablet TAKE 1 TABLET BY MOUTH EVERY DAY 2/28/22   Marcos Zavala MD   nystatin (MYCOSTATIN) powder Apply topically 2 (two) times daily. 10/13/21   Aby Anaya MD   omeprazole (PRILOSEC) 40 MG capsule TAKE 1 CAPSULE BY MOUTH EVERY DAY 5/5/23   Marcos Zavala MD   potassium chloride (MICRO-K) 10 MEQ CpSR TAKE 1 CAPSULE BY MOUTH ONCE DAILY. WHEN TAKING FUROSEMIDE(LASIX) FOR 60 DOSES 8/10/22   Marcos Zavala MD   tadalafiL (CIALIS) 20 MG Tab Take 1 tablet (20 mg total) by mouth once daily. Take 1 hour before intercourse 2/17/22   Bentley Ying MD   topiramate (TOPAMAX) 200 MG Tab TAKE 1 TABLET BY MOUTH TWICE A DAY 5/8/23   Marcos Zavala MD        Past medical history, surgical history, and family medical history reviewed and updated as appropriate.    Medications and allergies reviewed.     Objective:          Vitals:    07/07/23 1318   BP: 123/61   BP Location: Right arm   Patient Position: Sitting   BP Method: Large (Automatic)   Pulse: 73   SpO2: 97%   Weight: (!) 270.7 kg (596 lb 12.6 oz)   Height: 5' 6" (1.676 m)     Body mass index is 96.32 kg/m².  Physical Exam  Constitutional:       General: He is not in acute distress.     Appearance: He is well-developed. He is obese.   HENT:      Head: Normocephalic and atraumatic.      Nose: Nose normal.   Eyes:      General: No scleral icterus.  Neck:      Thyroid: No thyromegaly.      Vascular: No JVD.      Trachea: No tracheal deviation.   Cardiovascular:      Rate and Rhythm: Normal rate and regular rhythm.      Heart sounds: Normal heart sounds. No murmur heard.    No friction rub. No gallop.   Pulmonary:      Effort: Pulmonary effort is normal. No respiratory distress.      Breath sounds: Normal breath sounds. No wheezing " or rales.   Abdominal:      General: Bowel sounds are normal. There is no distension.      Palpations: Abdomen is soft. There is no mass.      Tenderness: There is no abdominal tenderness.   Musculoskeletal:         General: No tenderness. Normal range of motion.      Cervical back: Normal range of motion and neck supple.   Lymphadenopathy:      Cervical: No cervical adenopathy.   Skin:     General: Skin is warm and dry.      Findings: No rash.   Neurological:      Mental Status: He is alert and oriented to person, place, and time.      Cranial Nerves: No cranial nerve deficit.      Deep Tendon Reflexes: Reflexes normal.   Psychiatric:         Behavior: Behavior normal.       Lab Results   Component Value Date    WBC 4.98 07/03/2023    HGB 12.9 (L) 07/03/2023    HCT 41.7 07/03/2023     07/03/2023    CHOL 177 03/13/2023    TRIG 53 03/13/2023    HDL 42 03/13/2023     (H) 07/03/2023    AST 85 (H) 07/03/2023     07/03/2023    K 3.9 07/03/2023     07/03/2023    CREATININE 0.8 07/03/2023    BUN 8 07/03/2023    CO2 28 07/03/2023    TSH 2.346 03/13/2023    PSA 0.24 08/19/2013    INR 1.1 07/03/2023    GLUF 99 10/12/2016    HGBA1C 5.7 (H) 07/01/2023       Assessment:       1. Hospital discharge follow-up    2. Acute gallstone pancreatitis    3. Essential hypertension    4. Morbid obesity with body mass index of 70 and over in adult    5. Prediabetes    6. Bilateral primary osteoarthritis of knee    7. Obstructive sleep apnea on CPAP          Plan:     Ke was seen today for follow-up.    Diagnoses and all orders for this visit:    Hospital discharge follow-up    Acute gallstone pancreatitis  -     COMPREHENSIVE METABOLIC PANEL; Future  -     Lipid Panel; Future    Essential hypertension    Morbid obesity with body mass index of 70 and over in adult    Prediabetes    Bilateral primary osteoarthritis of knee    Obstructive sleep apnea on CPAP    Doing well since discharge  Awaiting bariatric  surgery appointment  Schedule to see general surgery  Scheduled for endoscopy scheduling desk    Discussed lifestyle changes  Will continue current management, will obtain fasting lipid panel and repeat CMP in 2 weeks.    The 10-year ASCVD risk score (Abdullahi SHEEHAN, et al., 2019) is: 10.4%    Values used to calculate the score:      Age: 53 years      Sex: Male      Is Non- : Yes      Diabetic: No      Tobacco smoker: No      Systolic Blood Pressure: 123 mmHg      Is BP treated: Yes      HDL Cholesterol: 42 mg/dL      Total Cholesterol: 177 mg/dL    Will likely start statin at that time.    Health maintenance reviewed with patient.     Follow up in about 3 months (around 10/7/2023).    Marcos Zavala MD  Internal Medicine / Primary Care  [facility]  7/7/2023

## 2023-07-12 ENCOUNTER — PATIENT MESSAGE (OUTPATIENT)
Dept: BARIATRICS | Facility: CLINIC | Age: 53
End: 2023-07-12
Payer: MEDICARE

## 2023-07-12 ENCOUNTER — OFFICE VISIT (OUTPATIENT)
Dept: SURGERY | Facility: CLINIC | Age: 53
End: 2023-07-12
Payer: MEDICARE

## 2023-07-12 VITALS
HEIGHT: 66 IN | BODY MASS INDEX: 50.62 KG/M2 | SYSTOLIC BLOOD PRESSURE: 148 MMHG | DIASTOLIC BLOOD PRESSURE: 70 MMHG | HEART RATE: 82 BPM | WEIGHT: 315 LBS

## 2023-07-12 DIAGNOSIS — K85.10 ACUTE BILIARY PANCREATITIS WITHOUT INFECTION OR NECROSIS: Primary | ICD-10-CM

## 2023-07-12 DIAGNOSIS — E66.01 MORBID OBESITY WITH BODY MASS INDEX OF 70 AND OVER IN ADULT: Chronic | ICD-10-CM

## 2023-07-12 DIAGNOSIS — M17.0 BILATERAL PRIMARY OSTEOARTHRITIS OF KNEE: ICD-10-CM

## 2023-07-12 DIAGNOSIS — D50.8 OTHER IRON DEFICIENCY ANEMIA: ICD-10-CM

## 2023-07-12 DIAGNOSIS — G47.33 OBSTRUCTIVE SLEEP APNEA ON CPAP: ICD-10-CM

## 2023-07-12 DIAGNOSIS — I10 ESSENTIAL HYPERTENSION: ICD-10-CM

## 2023-07-12 DIAGNOSIS — G25.81 RESTLESS LEG SYNDROME: ICD-10-CM

## 2023-07-12 DIAGNOSIS — R73.03 PREDIABETES: ICD-10-CM

## 2023-07-12 DIAGNOSIS — J41.0 SIMPLE CHRONIC BRONCHITIS: ICD-10-CM

## 2023-07-12 DIAGNOSIS — R06.09 DOE (DYSPNEA ON EXERTION): ICD-10-CM

## 2023-07-12 DIAGNOSIS — K85.10 GALLSTONE PANCREATITIS: ICD-10-CM

## 2023-07-12 PROCEDURE — 3077F PR MOST RECENT SYSTOLIC BLOOD PRESSURE >= 140 MM HG: ICD-10-PCS | Mod: CPTII,S$GLB,, | Performed by: SURGERY

## 2023-07-12 PROCEDURE — 3078F PR MOST RECENT DIASTOLIC BLOOD PRESSURE < 80 MM HG: ICD-10-PCS | Mod: CPTII,S$GLB,, | Performed by: SURGERY

## 2023-07-12 PROCEDURE — 3077F SYST BP >= 140 MM HG: CPT | Mod: CPTII,S$GLB,, | Performed by: SURGERY

## 2023-07-12 PROCEDURE — 1159F MED LIST DOCD IN RCRD: CPT | Mod: CPTII,S$GLB,, | Performed by: SURGERY

## 2023-07-12 PROCEDURE — 99999 PR PBB SHADOW E&M-EST. PATIENT-LVL III: CPT | Mod: PBBFAC,,, | Performed by: SURGERY

## 2023-07-12 PROCEDURE — 1159F PR MEDICATION LIST DOCUMENTED IN MEDICAL RECORD: ICD-10-PCS | Mod: CPTII,S$GLB,, | Performed by: SURGERY

## 2023-07-12 PROCEDURE — 3008F PR BODY MASS INDEX (BMI) DOCUMENTED: ICD-10-PCS | Mod: CPTII,S$GLB,, | Performed by: SURGERY

## 2023-07-12 PROCEDURE — 99205 PR OFFICE/OUTPT VISIT, NEW, LEVL V, 60-74 MIN: ICD-10-PCS | Mod: S$GLB,,, | Performed by: SURGERY

## 2023-07-12 PROCEDURE — 3044F PR MOST RECENT HEMOGLOBIN A1C LEVEL <7.0%: ICD-10-PCS | Mod: CPTII,S$GLB,, | Performed by: SURGERY

## 2023-07-12 PROCEDURE — 99999 PR PBB SHADOW E&M-EST. PATIENT-LVL III: ICD-10-PCS | Mod: PBBFAC,,, | Performed by: SURGERY

## 2023-07-12 PROCEDURE — 3044F HG A1C LEVEL LT 7.0%: CPT | Mod: CPTII,S$GLB,, | Performed by: SURGERY

## 2023-07-12 PROCEDURE — 3078F DIAST BP <80 MM HG: CPT | Mod: CPTII,S$GLB,, | Performed by: SURGERY

## 2023-07-12 PROCEDURE — 1160F RVW MEDS BY RX/DR IN RCRD: CPT | Mod: CPTII,S$GLB,, | Performed by: SURGERY

## 2023-07-12 PROCEDURE — 99205 OFFICE O/P NEW HI 60 MIN: CPT | Mod: S$GLB,,, | Performed by: SURGERY

## 2023-07-12 PROCEDURE — 1160F PR REVIEW ALL MEDS BY PRESCRIBER/CLIN PHARMACIST DOCUMENTED: ICD-10-PCS | Mod: CPTII,S$GLB,, | Performed by: SURGERY

## 2023-07-12 PROCEDURE — 3008F BODY MASS INDEX DOCD: CPT | Mod: CPTII,S$GLB,, | Performed by: SURGERY

## 2023-07-12 NOTE — PROGRESS NOTES
Surgery Clinic Note - H and P    Subjective:     Ke Oliver is a 53 y.o. male with h/o hypertension, diabetes, JUDSON  who presents to clinic for evaluation after recent hospitalization for gallstone pancreatitis. Patient presented to the emergency department after experiencing sudden onset tearing abdominal pain. RUQ u/s at that time of presentation showed cholelithiasis with mild CBD dilation to 7mm. Repeat RUQ u/s showed improvements to CBD of 5mm without endoscopic interventions necessary. His LFTs and lipase improved and pain resolved and he was discharged from the hospital. He was referred to our clinic for evaluation for elective cholecystectomy.      PMH:   Past Medical History:   Diagnosis Date    Bilateral primary osteoarthritis of knee     Hypertension     Obesity     Sleep apnea        Past Surgical History:   Past Surgical History:   Procedure Laterality Date    ANKLE SURGERY      ankle fracture pinnned    COLONOSCOPY N/A 2016    COLONOSCOPY;  Surgeon: Carson Mittal MD    HERNIA REPAIR      herniated testicle       Social History:  Social History     Socioeconomic History    Marital status:    Tobacco Use    Smoking status: Former     Packs/day: 1.00     Years: 17.00     Pack years: 17.00     Types: Cigarettes     Quit date: 2001     Years since quittin.9    Smokeless tobacco: Never   Substance and Sexual Activity    Alcohol use: Yes     Comment: 3 every other week     Drug use: No    Sexual activity: Not Currently   Social History Narrative    Wife  of kidney cancer in 2016     Social Determinants of Health     Transportation Needs: No Transportation Needs    Lack of Transportation (Medical): No    Lack of Transportation (Non-Medical): No   Physical Activity: Inactive    Days of Exercise per Week: 0 days    Minutes of Exercise per Session: 0 min   Housing Stability: Low Risk     Unable to Pay for Housing in the Last Year: No    Number of  Places Lived in the Last Year: 1    Unstable Housing in the Last Year: No       Allergies: Review of patient's allergies indicates:  No Known Allergies    Medications:  Current Outpatient Medications:     acetaminophen (TYLENOL) 325 MG tablet, Take 2 tablets (650 mg total) by mouth every 6 (six) hours as needed (arthritis pain)., Disp: , Rfl: 0    furosemide (LASIX) 40 MG tablet, TAKE 1 TABLET BY MOUTH TWICE A DAY, Disp: 180 tablet, Rfl: 1    JARDIANCE 10 mg tablet, TAKE 1 TABLET BY MOUTH EVERY DAY, Disp: 90 tablet, Rfl: 1    ketoconazole (NIZORAL) 2 % shampoo, APPLY TOPICALLY TWICE A WEEK. APPLY TO SCALP, LATHER, LET SIT 2-5 MINUTES THEN RINSE., Disp: 120 mL, Rfl: 3    lisinopriL (PRINIVIL,ZESTRIL) 2.5 MG tablet, TAKE 1 TABLET BY MOUTH EVERY DAY, Disp: 90 tablet, Rfl: 3    nystatin (MYCOSTATIN) powder, Apply topically 2 (two) times daily., Disp: 60 g, Rfl: 5    omeprazole (PRILOSEC) 40 MG capsule, TAKE 1 CAPSULE BY MOUTH EVERY DAY, Disp: 90 capsule, Rfl: 3    potassium chloride (MICRO-K) 10 MEQ CpSR, TAKE 1 CAPSULE BY MOUTH ONCE DAILY. WHEN TAKING FUROSEMIDE(LASIX) FOR 60 DOSES, Disp: 90 capsule, Rfl: 3    tadalafiL (CIALIS) 20 MG Tab, Take 1 tablet (20 mg total) by mouth once daily. Take 1 hour before intercourse, Disp: 10 tablet, Rfl: 11    Current Outpatient Medications on File Prior to Visit   Medication Sig Dispense Refill    acetaminophen (TYLENOL) 325 MG tablet Take 2 tablets (650 mg total) by mouth every 6 (six) hours as needed (arthritis pain).  0    furosemide (LASIX) 40 MG tablet TAKE 1 TABLET BY MOUTH TWICE A  tablet 1    JARDIANCE 10 mg tablet TAKE 1 TABLET BY MOUTH EVERY DAY 90 tablet 1    ketoconazole (NIZORAL) 2 % shampoo APPLY TOPICALLY TWICE A WEEK. APPLY TO SCALP, LATHER, LET SIT 2-5 MINUTES THEN RINSE. 120 mL 3    lisinopriL (PRINIVIL,ZESTRIL) 2.5 MG tablet TAKE 1 TABLET BY MOUTH EVERY DAY 90 tablet 3    nystatin (MYCOSTATIN) powder Apply topically 2 (two) times daily. 60  g 5    omeprazole (PRILOSEC) 40 MG capsule TAKE 1 CAPSULE BY MOUTH EVERY DAY 90 capsule 3    potassium chloride (MICRO-K) 10 MEQ CpSR TAKE 1 CAPSULE BY MOUTH ONCE DAILY. WHEN TAKING FUROSEMIDE(LASIX) FOR 60 DOSES 90 capsule 3    tadalafiL (CIALIS) 20 MG Tab Take 1 tablet (20 mg total) by mouth once daily. Take 1 hour before intercourse 10 tablet 11     No current facility-administered medications on file prior to visit.         Objective:     PHYSICAL EXAM:  Vital Signs (Most Recent)  Pulse: 82 (07/12/23 1342)  BP: (!) 148/70 (07/12/23 1342)    Review of Systems   Constitutional:  Negative for chills, fever and weight loss.   Respiratory:  Negative for cough.    Cardiovascular:  Negative for chest pain.   Gastrointestinal:  Negative for abdominal pain, constipation, diarrhea, heartburn, nausea and vomiting.  A 10+ review of systems was performed with pertinent positives and negatives noted above in the history of present illness.  Other systems were negative unless otherwise specified.    Physical Exam  Vitals and nursing note reviewed.   Constitutional:       General: He is not in acute distress.     Appearance: Normal appearance. He is obese. He is not ill-appearing.   Cardiovascular:      Rate and Rhythm: Normal rate.   Pulmonary:      Effort: Pulmonary effort is normal.   Abdominal:      Palpations: Abdomen is soft.      Tenderness: There is no abdominal tenderness. There is no guarding.   Skin:     General: Skin is warm and dry.      Capillary Refill: Capillary refill takes less than 2 seconds.   Neurological:      Mental Status: He is alert and oriented to person, place, and time.   Psychiatric:         Mood and Affect: Mood normal.         Behavior: Behavior normal.        Pathology- reviewed    Specimens (From admission, onward)      None                 Pertinent imaging/labs:   [facility] ultrasound: 7/2      Assessment:     53 y.o. male with cholelithiasis with recent history of gallstone pancreatitis.  Patient has a BMI of 99 and would be a poor surgical candidate.    Plan:     - Recommend referral to bariatric medicine and/or PCP for optimization of weight prior to consideration for surgical management  -Low threshold to return to ER if experiencing similar pain  -Follow-up with GI and consider endoscopic interventions if recurrence.      Luana Madrigal MD   [facility]

## 2023-07-14 ENCOUNTER — TELEPHONE (OUTPATIENT)
Dept: ENDOSCOPY | Facility: HOSPITAL | Age: 53
End: 2023-07-14

## 2023-07-14 ENCOUNTER — PATIENT MESSAGE (OUTPATIENT)
Dept: ENDOSCOPY | Facility: HOSPITAL | Age: 53
End: 2023-07-14

## 2023-07-14 ENCOUNTER — CLINICAL SUPPORT (OUTPATIENT)
Dept: ENDOSCOPY | Facility: HOSPITAL | Age: 53
End: 2023-07-14
Attending: INTERNAL MEDICINE
Payer: MEDICARE

## 2023-07-14 VITALS — WEIGHT: 315 LBS | HEIGHT: 66 IN | BODY MASS INDEX: 50.62 KG/M2

## 2023-07-14 DIAGNOSIS — Z12.11 SCREENING FOR COLON CANCER: ICD-10-CM

## 2023-07-14 RX ORDER — POLYETHYLENE GLYCOL 3350, SODIUM SULFATE ANHYDROUS, SODIUM BICARBONATE, SODIUM CHLORIDE, POTASSIUM CHLORIDE 236; 22.74; 6.74; 5.86; 2.97 G/4L; G/4L; G/4L; G/4L; G/4L
4 POWDER, FOR SOLUTION ORAL ONCE
Qty: 4000 ML | Refills: 0 | Status: SHIPPED | OUTPATIENT
Start: 2023-07-14 | End: 2023-07-14

## 2023-07-14 NOTE — TELEPHONE ENCOUNTER
Spoke to patient to schedule procedure(s) Colonoscopy       Physician to perform procedure(s) Dr. GUNJAN Hannah  Date of Procedure (s) 8/23/23  Arrival Time 1:00 PM  Time of Procedure(s) 2:00 PM   Location of Procedure(s) [place] 2nd Floor  Type of Rx Prep sent to patient: PEG  Instructions provided to patient via [facility]    Patient was informed on the following information and verbalized understanding. Screening questionnaire reviewed with patient and complete. If procedure requires anesthesia, a responsible adult needs to be present to accompany the patient home, patient cannot drive after receiving anesthesia. Appointment details are tentative, especially check-in time. Patient will receive a prep-op call 4 days prior to confirm check-in time for procedure. If applicable the patient should contact their pharmacy to verify Rx for procedure prep is ready for pick-up. Patient was advised to call the scheduling department at [phone] if pharmacy states no Rx is available. Patient was advised to call the endoscopy scheduling department if any questions or concerns arise.      [facility] Scheduling Department

## 2023-07-20 ENCOUNTER — OFFICE VISIT (OUTPATIENT)
Dept: BARIATRICS | Facility: CLINIC | Age: 53
End: 2023-07-20
Payer: MEDICARE

## 2023-07-20 ENCOUNTER — CLINICAL SUPPORT (OUTPATIENT)
Dept: BARIATRICS | Facility: CLINIC | Age: 53
End: 2023-07-20
Payer: MEDICARE

## 2023-07-20 VITALS
HEIGHT: 66 IN | BODY MASS INDEX: 50.62 KG/M2 | WEIGHT: 315 LBS | BODY MASS INDEX: 98.07 KG/M2 | WEIGHT: 315 LBS | SYSTOLIC BLOOD PRESSURE: 146 MMHG | OXYGEN SATURATION: 96 % | HEART RATE: 69 BPM | DIASTOLIC BLOOD PRESSURE: 88 MMHG

## 2023-07-20 DIAGNOSIS — E66.01 CLASS 3 SEVERE OBESITY DUE TO EXCESS CALORIES WITH SERIOUS COMORBIDITY AND BODY MASS INDEX (BMI) GREATER THAN OR EQUAL TO 70 IN ADULT: Primary | ICD-10-CM

## 2023-07-20 DIAGNOSIS — G47.33 OBSTRUCTIVE SLEEP APNEA ON CPAP: ICD-10-CM

## 2023-07-20 DIAGNOSIS — Z71.3 ENCOUNTER FOR WEIGHT LOSS COUNSELING: ICD-10-CM

## 2023-07-20 DIAGNOSIS — E66.01 MORBID OBESITY WITH BODY MASS INDEX OF 70 AND OVER IN ADULT: ICD-10-CM

## 2023-07-20 DIAGNOSIS — Z71.3 DIETARY COUNSELING: ICD-10-CM

## 2023-07-20 DIAGNOSIS — I10 ESSENTIAL HYPERTENSION: Primary | ICD-10-CM

## 2023-07-20 DIAGNOSIS — I10 ESSENTIAL HYPERTENSION: ICD-10-CM

## 2023-07-20 DIAGNOSIS — R73.03 PREDIABETES: ICD-10-CM

## 2023-07-20 PROCEDURE — 3008F PR BODY MASS INDEX (BMI) DOCUMENTED: ICD-10-PCS | Mod: CPTII,S$GLB,, | Performed by: STUDENT IN AN ORGANIZED HEALTH CARE EDUCATION/TRAINING PROGRAM

## 2023-07-20 PROCEDURE — 99999 PR PBB SHADOW E&M-EST. PATIENT-LVL III: CPT | Mod: PBBFAC,,, | Performed by: STUDENT IN AN ORGANIZED HEALTH CARE EDUCATION/TRAINING PROGRAM

## 2023-07-20 PROCEDURE — 99204 PR OFFICE/OUTPT VISIT, NEW, LEVL IV, 45-59 MIN: ICD-10-PCS | Mod: S$GLB,,, | Performed by: STUDENT IN AN ORGANIZED HEALTH CARE EDUCATION/TRAINING PROGRAM

## 2023-07-20 PROCEDURE — 3044F PR MOST RECENT HEMOGLOBIN A1C LEVEL <7.0%: ICD-10-PCS | Mod: CPTII,S$GLB,, | Performed by: STUDENT IN AN ORGANIZED HEALTH CARE EDUCATION/TRAINING PROGRAM

## 2023-07-20 PROCEDURE — 3077F PR MOST RECENT SYSTOLIC BLOOD PRESSURE >= 140 MM HG: ICD-10-PCS | Mod: CPTII,S$GLB,, | Performed by: STUDENT IN AN ORGANIZED HEALTH CARE EDUCATION/TRAINING PROGRAM

## 2023-07-20 PROCEDURE — 1160F PR REVIEW ALL MEDS BY PRESCRIBER/CLIN PHARMACIST DOCUMENTED: ICD-10-PCS | Mod: CPTII,S$GLB,, | Performed by: STUDENT IN AN ORGANIZED HEALTH CARE EDUCATION/TRAINING PROGRAM

## 2023-07-20 PROCEDURE — 3077F SYST BP >= 140 MM HG: CPT | Mod: CPTII,S$GLB,, | Performed by: STUDENT IN AN ORGANIZED HEALTH CARE EDUCATION/TRAINING PROGRAM

## 2023-07-20 PROCEDURE — 3079F DIAST BP 80-89 MM HG: CPT | Mod: CPTII,S$GLB,, | Performed by: STUDENT IN AN ORGANIZED HEALTH CARE EDUCATION/TRAINING PROGRAM

## 2023-07-20 PROCEDURE — 1159F PR MEDICATION LIST DOCUMENTED IN MEDICAL RECORD: ICD-10-PCS | Mod: CPTII,S$GLB,, | Performed by: STUDENT IN AN ORGANIZED HEALTH CARE EDUCATION/TRAINING PROGRAM

## 2023-07-20 PROCEDURE — 99499 UNLISTED E&M SERVICE: CPT | Mod: S$GLB,,, | Performed by: DIETITIAN, REGISTERED

## 2023-07-20 PROCEDURE — 1159F MED LIST DOCD IN RCRD: CPT | Mod: CPTII,S$GLB,, | Performed by: STUDENT IN AN ORGANIZED HEALTH CARE EDUCATION/TRAINING PROGRAM

## 2023-07-20 PROCEDURE — 99204 OFFICE O/P NEW MOD 45 MIN: CPT | Mod: S$GLB,,, | Performed by: STUDENT IN AN ORGANIZED HEALTH CARE EDUCATION/TRAINING PROGRAM

## 2023-07-20 PROCEDURE — 1160F RVW MEDS BY RX/DR IN RCRD: CPT | Mod: CPTII,S$GLB,, | Performed by: STUDENT IN AN ORGANIZED HEALTH CARE EDUCATION/TRAINING PROGRAM

## 2023-07-20 PROCEDURE — 3079F PR MOST RECENT DIASTOLIC BLOOD PRESSURE 80-89 MM HG: ICD-10-PCS | Mod: CPTII,S$GLB,, | Performed by: STUDENT IN AN ORGANIZED HEALTH CARE EDUCATION/TRAINING PROGRAM

## 2023-07-20 PROCEDURE — 99999 PR PBB SHADOW E&M-EST. PATIENT-LVL III: ICD-10-PCS | Mod: PBBFAC,,, | Performed by: STUDENT IN AN ORGANIZED HEALTH CARE EDUCATION/TRAINING PROGRAM

## 2023-07-20 PROCEDURE — 3008F BODY MASS INDEX DOCD: CPT | Mod: CPTII,S$GLB,, | Performed by: STUDENT IN AN ORGANIZED HEALTH CARE EDUCATION/TRAINING PROGRAM

## 2023-07-20 PROCEDURE — 3044F HG A1C LEVEL LT 7.0%: CPT | Mod: CPTII,S$GLB,, | Performed by: STUDENT IN AN ORGANIZED HEALTH CARE EDUCATION/TRAINING PROGRAM

## 2023-07-20 PROCEDURE — 99499 NO LOS: ICD-10-PCS | Mod: S$GLB,,, | Performed by: DIETITIAN, REGISTERED

## 2023-07-20 RX ORDER — TIRZEPATIDE 5 MG/.5ML
5 INJECTION, SOLUTION SUBCUTANEOUS
Qty: 4 PEN | Refills: 0 | Status: SHIPPED | OUTPATIENT
Start: 2023-08-17 | End: 2023-09-08

## 2023-07-20 RX ORDER — TIRZEPATIDE 7.5 MG/.5ML
7.5 INJECTION, SOLUTION SUBCUTANEOUS
Qty: 4 PEN | Refills: 0 | Status: SHIPPED | OUTPATIENT
Start: 2023-09-14 | End: 2023-10-06

## 2023-07-20 RX ORDER — TIRZEPATIDE 2.5 MG/.5ML
2.5 INJECTION, SOLUTION SUBCUTANEOUS
Qty: 4 PEN | Refills: 0 | Status: SHIPPED | OUTPATIENT
Start: 2023-07-20 | End: 2023-08-11

## 2023-07-20 NOTE — PROGRESS NOTES
"NUTRITIONAL CONSULT    Referring Physician: Priya Rizzo M.D.  Reason for MNT Referral: Initial assessment for sleeve gastrectomy work-up    PAST MEDICAL HISTORY:   53 y.o. male  Body mass index is 98.07 kg/m²..    Weight history includes: Highest adult weight 613 lbs at age 53, Lowest adult weight 568 lbs around age 46.  Dieting attempts include: Tracking on My Fitness Pal, using a , Topamax, and Ozempic. No significant weight loss    Currently taking Jardiance. Dr Boykin prescribed Mounjaro today.    Past Medical History:   Diagnosis Date    Bilateral primary osteoarthritis of knee     Hypertension     Obesity     Sleep apnea        CLINICAL DATA:  53 y.o.-year-old Black or  male.  Height: 5'6"  Weight: 607 lbs  IBW: 151 lbs  BMI: 98.07  The patient's goal weight (50% EBW): 379 lbs  Personal goal weight: none stated    Goal for Bariatric Surgery: to improve health, to improve quality of life, to lose weight, be more active and more mobile    DAILY NUTRITIONAL NEEDS: pre-op nutritional bariatric guidelines to promote weight loss  [id number] Calories    Grams Protein    NUTRITION & HEALTH HISTORY:  Greatest challenge: dining out frequency, starchy CHO, portion control, and high-fat diet    Current diet recall:     B: 3 scrambled eggs or grits and pork sausage or ho or oatmeal with cinnamon and vanilla, and a cup of coffee with creamer  L: 3 pieces fried chicken with fries or beans at [employer] with cold drink or burger combo from [employer]  D: Beans with baked pork chop   S: Chips or popcorn or a Greek yogurt    Current Diet:  Meal pattern: 3 meals  Protein supplements: Occasional Premier shake  Snackin / day  Vegetables: Likes a few. Eats  3 x week . Peas, corn, spinach, or broccoli  Fruits: Likes a variety. Eats 2-3 times per week. Canned mandarin oranges, pineapple chunks, or fresh fruit tray  Beverages: water and sugar-free beverages  Dining out:  2 x week  Mostly " fast food.  Cooking at home:  3-4 x week  Mostly baked, smothered, and stove top  meat, fish, and starchy CHO. Red beans and rice with baked pork chop or chicken, turkey wings and gravy and rice, steak and potatoes, spaghetti and meat sauce, air fried fish and shrimp    Exercise:  Past exercise:  Fair  Pre-covid, walked    Current exercise: None    Restrictions to exercise: None    Vitamins / Minerals / Herbs:   None    Labs:   Reviewed.    Food Allergies:   None reported    Social:  Disabled.  Lives with adult daughter.  Grocery shopping and food prep: daughter shops, PT cooks.  Patient believes the household will be supportive after surgery.  Alcohol:  Rarely .  Smoking: Cigar 4 months ago; otherwise, not a regular tobacco user    ASSESSMENT:  Patient reports attempts at weight loss, only to regain lost weight.  Patient demonstrated knowledge of healthy eating behaviors and exercise patterns; admits to not eating healthy and not exercising at this point.  Patient states willingness to change lifestyle and make behavior modifications    Barriers to Education: none    Stage of change: determination    NUTRITION DIAGNOSIS:    Morbid Obesity related to Excessive carbohydrate intake, Excessive calorie intake, and Physical inactivity as evidence by BMI.    BARIATRIC DIET DISCUSSION/PLAN:  Discussed diet after surgery and related to patient's food record.  Reviewed nutrition guidelines for before and after surgery.  Answered all questions.  Discussed portion sizing - protein portion no bigger than size of hand  Discussed high protein snacks instead of starchy CHO snacks  Discussed bariatric plate method  Continue to review Bariatric Nutrition Guidebook at home and call with any questions.  Work on gradually cutting back on starchy CHO in the diet.      PT Plan  Switch to smaller plates  Cut down on portion size of starchy CHO    RECOMMENDATIONS:  Pt is potential candidate for surgery.    Needs additional visits with  RD.    Patient verbalized understanding.    Communicated nutrition plan with bariatric team.    SESSION TIME:  30 minutes

## 2023-07-20 NOTE — PROGRESS NOTES
Subjective     Patient ID: Ke Oliver is a 53 y.o. male.    Chief Complaint: Obesity and Consult    Patient presents for treatment of obesity.     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 Atkins, but required to get down to 500 lbs  Dr. Anaya (11/2016-10/2021) - first prescribed  Topiramate (patient didn't find it to be effective), then Ozempic (stomach cramps and diarrhea)  Has also been on Metformin (d/c due to diarrhea); currently on Jardiance    Was recently hospitalized with gallstone pancreatitis 7/1-7/3/2023.  Lipase and U/S findings have normalized.  Patient was referred by Dr. Santiago.  Would like patient to lose about 100 lbs prior to cholecystectomy and possible bariatric surgery.      Co-morbidities  HTN  Prediabetes  Hepatic steatosis  STALLINGS  JUDSON on CPAP  PABLO  OA, bilateral knees      Current Physical Activity  Gets around house and goes to [employer] on Sunday  Has Swag Of The Month with brother    Current Eating Habits  Vegetables: corn, peas, brocolli, spinach  Baked chops  Beans and rice  Eggs, grits, ho  Raisin bran crunch cereal  Protein shakes occasionally    Recently started tracking food with [employer]  Working with Health   Also met with bariatric dietician today    Medical Weight Loss (unable to stand on [employer])  7/20/2023: 607 lbs 9.4 oz, BMI 98.07        Review of Systems   Constitutional:  Negative for chills and fever.   Respiratory:  Negative for shortness of breath.    Cardiovascular:  Negative for chest pain.   Gastrointestinal:  Negative for abdominal pain, nausea and vomiting.   Neurological:  Negative for dizziness and light-headedness.   Psychiatric/Behavioral:  Negative for agitation.         Objective    Latest Reference Range & Units 07/02/23 04:41  07/03/23 05:11   WBC 3.90 - 12.70 K/uL 6.16 4.98   RBC 4.60 - 6.20 M/uL 4.76 4.73   Hemoglobin 14.0 - 18.0 g/dL 12.8 (L) 12.9 (L)   Hematocrit 40.0 - 54.0 % 41.5 41.7   MCV 82 - 98 fL 87 88   MCH 27.0 - 31.0 pg 26.9 (L) 27.3   MCHC 32.0 - 36.0 g/dL 30.8 (L) 30.9 (L)   RDW 11.5 - 14.5 % 14.8 (H) 14.6 (H)   Platelets 150 - 450 K/uL 255 260   MPV 9.2 - 12.9 fL 10.2 10.2   Gran % 38.0 - 73.0 % 70.1 57.6   Lymph % 18.0 - 48.0 % 15.7 (L) 24.9   Mono % 4.0 - 15.0 % 11.2 13.1   Eosinophil % 0.0 - 8.0 % 2.3 3.8   Basophil % 0.0 - 1.9 % 0.5 0.4   Immature Granulocytes 0.0 - 0.5 % 0.2 0.2   Gran # (ANC) 1.8 - 7.7 K/uL 4.3 2.9   Lymph # 1.0 - 4.8 K/uL 1.0 1.2   Mono # 0.3 - 1.0 K/uL 0.7 0.7   Eos # 0.0 - 0.5 K/uL 0.1 0.2   Baso # 0.00 - 0.20 K/uL 0.03 0.02   Immature Grans (Abs) 0.00 - 0.04 K/uL 0.01 0.01   nRBC 0 /100 WBC 0 0   Differential Method  Automated Automated   Protime 9.0 - 12.5 sec 11.3 11.7   INR 0.8 - 1.2  1.1 1.1   Sodium 136 - 145 mmol/L 140 139   Potassium 3.5 - 5.1 mmol/L 4.2 3.9   Chloride 95 - 110 mmol/L 104 104   CO2 23 - 29 mmol/L 27 28   Anion Gap 8 - 16 mmol/L 9 7 (L)   BUN 6 - 20 mg/dL 10 8   Creatinine 0.5 - 1.4 mg/dL 1.0 0.8   eGFR >60 mL/min/1.73 m^2 >60.0 >60.0   Glucose 70 - 110 mg/dL 95 87   Calcium 8.7 - 10.5 mg/dL 8.7 8.9   Phosphorus 2.7 - 4.5 mg/dL 3.1 3.1   Magnesium 1.6 - 2.6 mg/dL 2.1 2.1   Alkaline Phosphatase 55 - 135 U/L 153 (H) 158 (H)   PROTEIN TOTAL 6.0 - 8.4 g/dL 6.7 6.6   Albumin 3.5 - 5.2 g/dL 3.1 (L) 3.1 (L)   BILIRUBIN TOTAL 0.1 - 1.0 mg/dL 1.0 0.6   AST 10 - 40 U/L 216 (H) 85 (H)   ALT 10 - 44 U/L 307 (H) 215 (H)   Lipase 4 - 60 U/L 14    (L): Data is abnormally low  (H): Data is abnormally high    Vitals:    07/20/23 1041   BP: (!) 146/88   Pulse: 69       Physical Exam  Vitals reviewed.   Constitutional:       General: He is not in acute distress.     Appearance: Normal appearance. He is obese. He is not ill-appearing, toxic-appearing or diaphoretic.   HENT:      Head:  Normocephalic and atraumatic.   Cardiovascular:      Rate and Rhythm: Normal rate.   Pulmonary:      Effort: Pulmonary effort is normal. No respiratory distress.   Skin:     General: Skin is warm and dry.   Neurological:      Mental Status: He is alert and oriented to person, place, and time.          Assessment and Plan     1. Class 3 severe obesity due to excess calories with serious comorbidity and body mass index (BMI) greater than or equal to 70 in adult  -     tirzepatide (MOUNJARO) 2.5 mg/0.5 mL PnIj; Inject 2.5 mg into the skin every 7 days. for 4 doses  Dispense: 4 pen ; Refill: 0  -     tirzepatide (MOUNJARO) 5 mg/0.5 mL PnIj; Inject 5 mg into the skin every 7 days. for 4 doses  Dispense: 4 pen ; Refill: 0  -     tirzepatide (MOUNJARO) 7.5 mg/0.5 mL PnIj; Inject 7.5 mg into the skin every 7 days. for 4 doses  Dispense: 4 pen ; Refill: 0    2. Prediabetes  -     tirzepatide (MOUNJARO) 2.5 mg/0.5 mL PnIj; Inject 2.5 mg into the skin every 7 days. for 4 doses  Dispense: 4 pen ; Refill: 0  -     tirzepatide (MOUNJARO) 5 mg/0.5 mL PnIj; Inject 5 mg into the skin every 7 days. for 4 doses  Dispense: 4 pen ; Refill: 0  -     tirzepatide (MOUNJARO) 7.5 mg/0.5 mL PnIj; Inject 7.5 mg into the skin every 7 days. for 4 doses  Dispense: 4 pen ; Refill: 0    3. Essential hypertension    4. Obstructive sleep apnea on CPAP    5. Encounter for weight loss counseling        - Discussed risk of pancreatitis while taking Mounjaro. Patient states understanding.  Start with 2.5 mg weekly x 4 weeks, then 5 mg weekly x 4 weeks, then 7.5 mg weekly x 4 weeks.    - Log all food and beverage intake. Bariatric diet as discussed with Samra Rubio RD    - Activity as tolerated

## 2023-07-24 RX ORDER — POTASSIUM CHLORIDE 750 MG/1
10 CAPSULE, EXTENDED RELEASE ORAL DAILY
Qty: 90 CAPSULE | Refills: 3 | Status: SHIPPED | OUTPATIENT
Start: 2023-07-24 | End: 2024-02-26 | Stop reason: SDUPTHER

## 2023-07-24 NOTE — TELEPHONE ENCOUNTER
Refill Decision Note   Ke Oliver  is requesting a refill authorization.  Brief Assessment and Rationale for Refill:  Approve     Medication Therapy Plan:         Comments:     Note composed:6:00 PM 07/24/2023

## 2023-08-22 ENCOUNTER — TELEPHONE (OUTPATIENT)
Dept: ENDOSCOPY | Facility: HOSPITAL | Age: 53
End: 2023-08-22
Payer: MEDICARE

## 2023-08-23 ENCOUNTER — HOSPITAL ENCOUNTER (OUTPATIENT)
Facility: HOSPITAL | Age: 53
Discharge: HOME | End: 2023-08-23
Attending: INTERNAL MEDICINE | Admitting: INTERNAL MEDICINE
Payer: MEDICARE

## 2023-08-23 ENCOUNTER — ANESTHESIA (OUTPATIENT)
Dept: ENDOSCOPY | Facility: HOSPITAL | Age: 53
End: 2023-08-23
Payer: MEDICARE

## 2023-08-23 ENCOUNTER — ANESTHESIA EVENT (OUTPATIENT)
Dept: ENDOSCOPY | Facility: HOSPITAL | Age: 53
End: 2023-08-23
Payer: MEDICARE

## 2023-08-23 DIAGNOSIS — Z12.11 SCREENING FOR COLON CANCER: ICD-10-CM

## 2023-08-23 PROCEDURE — D9220A PRA ANESTHESIA: Mod: ANES,,, | Performed by: ANESTHESIOLOGY

## 2023-08-23 PROCEDURE — 25000003 PHARM REV CODE 250: Performed by: NURSE ANESTHETIST, CERTIFIED REGISTERED

## 2023-08-23 PROCEDURE — D9220A PRA ANESTHESIA: ICD-10-PCS | Mod: ANES,,, | Performed by: ANESTHESIOLOGY

## 2023-08-23 PROCEDURE — 25000003 PHARM REV CODE 250: Performed by: INTERNAL MEDICINE

## 2023-08-23 PROCEDURE — G0121 COLON CA SCRN NOT HI RSK IND: HCPCS | Mod: ,,, | Performed by: INTERNAL MEDICINE

## 2023-08-23 PROCEDURE — 37000008 HC ANESTHESIA 1ST 15 MINUTES: Performed by: INTERNAL MEDICINE

## 2023-08-23 PROCEDURE — D9220A PRA ANESTHESIA: Mod: CRNA,,, | Performed by: NURSE ANESTHETIST, CERTIFIED REGISTERED

## 2023-08-23 PROCEDURE — 63600175 PHARM REV CODE 636 W HCPCS: Performed by: NURSE ANESTHETIST, CERTIFIED REGISTERED

## 2023-08-23 PROCEDURE — 37000009 HC ANESTHESIA EA ADD 15 MINS: Performed by: INTERNAL MEDICINE

## 2023-08-23 PROCEDURE — D9220A PRA ANESTHESIA: ICD-10-PCS | Mod: CRNA,,, | Performed by: NURSE ANESTHETIST, CERTIFIED REGISTERED

## 2023-08-23 PROCEDURE — G0121 COLON CA SCRN NOT HI RSK IND: HCPCS | Performed by: INTERNAL MEDICINE

## 2023-08-23 PROCEDURE — G0121 COLON CA SCRN NOT HI RSK IND: ICD-10-PCS | Mod: ,,, | Performed by: INTERNAL MEDICINE

## 2023-08-23 RX ORDER — SODIUM CHLORIDE 0.9 % (FLUSH) 0.9 %
10 SYRINGE (ML) INJECTION
Status: DISCONTINUED | OUTPATIENT
Start: 2023-08-23 | End: 2023-08-23 | Stop reason: HOSPADM

## 2023-08-23 RX ORDER — SODIUM CHLORIDE 9 MG/ML
INJECTION, SOLUTION INTRAVENOUS CONTINUOUS
Status: DISCONTINUED | OUTPATIENT
Start: 2023-08-23 | End: 2023-08-23 | Stop reason: HOSPADM

## 2023-08-23 RX ORDER — PROPOFOL 10 MG/ML
VIAL (ML) INTRAVENOUS CONTINUOUS PRN
Status: DISCONTINUED | OUTPATIENT
Start: 2023-08-23 | End: 2023-08-23

## 2023-08-23 RX ORDER — PROPOFOL 10 MG/ML
VIAL (ML) INTRAVENOUS
Status: DISCONTINUED | OUTPATIENT
Start: 2023-08-23 | End: 2023-08-23

## 2023-08-23 RX ORDER — LIDOCAINE HYDROCHLORIDE 20 MG/ML
INJECTION INTRAVENOUS
Status: DISCONTINUED | OUTPATIENT
Start: 2023-08-23 | End: 2023-08-23

## 2023-08-23 RX ADMIN — PROPOFOL 20 MG: 10 INJECTION, EMULSION INTRAVENOUS at 02:08

## 2023-08-23 RX ADMIN — PROPOFOL 100 MCG/KG/MIN: 10 INJECTION, EMULSION INTRAVENOUS at 02:08

## 2023-08-23 RX ADMIN — LIDOCAINE HYDROCHLORIDE 50 MG: 20 INJECTION INTRAVENOUS at 02:08

## 2023-08-23 RX ADMIN — GLYCOPYRROLATE 0.1 MG: 0.2 INJECTION, SOLUTION INTRAMUSCULAR; INTRAVENOUS at 02:08

## 2023-08-23 RX ADMIN — SODIUM CHLORIDE: 9 INJECTION, SOLUTION INTRAVENOUS at 01:08

## 2023-08-23 RX ADMIN — PROPOFOL 40 MG: 10 INJECTION, EMULSION INTRAVENOUS at 02:08

## 2023-08-23 NOTE — PLAN OF CARE
..Pt alert and orientated. Family at bs. VSS. No distress noted. Pt denies N/V/D. Pt tolerating PO fluids.  Pt states they are ready for discharge.

## 2023-08-23 NOTE — TRANSFER OF CARE
"Anesthesia Transfer of Care Note    Patient: Ke Oliver    Procedure(s) Performed: Procedure(s) (LRB):  COLONOSCOPY (N/A)    Patient location: [facility]    Anesthesia Type: MAC    Transport from OR: Transported from OR on 2-3 L/min O2 by NC with adequate spontaneous ventilation    Post pain: adequate analgesia    Post assessment: no apparent anesthetic complications and tolerated procedure well    Post vital signs: stable    Level of consciousness: awake, alert and oriented    Nausea/Vomiting: no nausea/vomiting    Complications: none    Transfer of care protocol was followed      Last vitals:   Visit Vitals  /63 (Patient Position: Lying)   Pulse 73   Temp 36.7 °C (98.1 °F) (Temporal)   Resp 16   Ht 5' 6" (1.676 m)   Wt (!) 275.3 kg (607 lb)   SpO2 95%   BMI 97.97 kg/m²     "

## 2023-08-23 NOTE — PROVATION PATIENT INSTRUCTIONS
Discharge Summary/Instructions after an Endoscopic Procedure  Patient Name: Ke Oliver  Patient MRN: [id number]  Patient YOB: 1970 Wednesday, August 23, 2023  Blake Hannah MD  Dear patient,  As a result of recent federal legislation (The Federal Cures Act), you may   receive lab or pathology results from your procedure in your [facility]   account before your physician is able to contact you. Your physician or   their representative will relay the results to you with their   recommendations at their soonest availability.  Thank you,  RESTRICTIONS:  During your procedure today, you received medications for sedation.  These   medications may affect your judgment, balance and coordination.  Therefore,   for 24 hours, you have the following restrictions:   - DO NOT drive a car, operate machinery, make legal/financial decisions,   sign important papers or drink alcohol.    ACTIVITY:  Today: no heavy lifting, straining or running due to procedural   sedation/anesthesia.  The following day: return to full activity including work.  DIET:  Eat and drink normally unless instructed otherwise.     TREATMENT FOR COMMON SIDE EFFECTS:  - Mild abdominal pain, nausea, belching, bloating or excessive gas:  rest,   eat lightly and use a heating pad.  - Sore Throat: treat with throat lozenges and/or gargle with warm salt   water.  - Because air was used during the procedure, expelling large amounts of air   from your rectum or belching is normal.  - If a bowel prep was taken, you may not have a bowel movement for 1-3 days.    This is normal.  SYMPTOMS TO WATCH FOR AND REPORT TO YOUR PHYSICIAN:  1. Abdominal pain or bloating, other than gas cramps.  2. Chest pain.  3. Back pain.  4. Signs of infection such as: chills or fever occurring within 24 hours   after the procedure.  5. Rectal bleeding, which would show as bright red, maroon, or black stools.   (A tablespoon of blood from the rectum is not serious, especially if    hemorrhoids are present.)  6. Vomiting.  7. Weakness or dizziness.  GO DIRECTLY TO THE NEAREST EMERGENCY ROOM IF YOU HAVE ANY OF THE FOLLOWING:      Difficulty breathing              Chills and/or fever over 101 F   Persistent vomiting and/or vomiting blood   Severe abdominal pain   Severe chest pain   Black, tarry stools   Bleeding- more than one tablespoon   Any other symptom or condition that you feel may need urgent attention  Your doctor recommends these additional instructions:  If any biopsies were taken, your doctors clinic will contact you in 1 to 2   weeks with any results.  - Discharge patient to home (ambulatory).   - Patient has a contact number available for emergencies.  The signs and   symptoms of potential delayed complications were discussed with the   patient.  Return to normal activities tomorrow.  Written discharge   instructions were provided to the patient.   - Resume previous diet.   - Continue present medications.   - Return to primary care physician as previously scheduled.   - Repeat colonoscopy in 10 years for screening purposes.  For questions, problems or results please call your physician - Blake Hannah MD at Work:  [phone].  [facility], EMERGENCY ROOM PHONE NUMBER: [phone]  IF A COMPLICATION OR EMERGENCY SITUATION ARISES AND YOU ARE UNABLE TO REACH   YOUR PHYSICIAN - GO DIRECTLY TO THE EMERGENCY ROOM.  Blake Hannah MD  8/23/2023 3:00:39 PM  This report has been verified and signed electronically.  Dear patient,  As a result of recent federal legislation (The Federal Cures Act), you may   receive lab or pathology results from your procedure in your [facility]   account before your physician is able to contact you. Your physician or   their representative will relay the results to you with their   recommendations at their soonest availability.  Thank you,  PROVATION

## 2023-08-23 NOTE — H&P
Short Stay Endoscopy History and Physical    PCP - Marcos Zavala MD  Referring Physician - Marcos Zavala MD  [id number] [place] [address]    Procedure - colonoscopy  ASA - per anesthesia  Mallampati - per anesthesia  History of Anesthesia problems - no  Family history Anesthesia problems -  no   Plan of anesthesia - General    HPI:  This is a 53 y.o. male here for evaluation of: hx of colon polyps    Reflux - no  Dysphagia - no  Abdominal pain - no  Diarrhea - no    ROS:  Constitutional: No fevers, chills, No weight loss  CV: No chest pain  Pulm: No cough, No shortness of breath  GI: see HPI    Medical History:  has a past medical history of Bilateral primary osteoarthritis of knee, Hypertension, Obesity, and Sleep apnea.    Surgical History:  has a past surgical history that includes Ankle surgery (1978); Hernia repair (1999); and Colonoscopy (N/A, 8/4/2016).    Family History: family history includes Cancer in his mother..    Social History:  reports that he quit smoking about 22 years ago. His smoking use included cigarettes. He started smoking about 39 years ago. He has a 17.0 pack-year smoking history. He has never been exposed to tobacco smoke. He has never used smokeless tobacco. He reports current alcohol use. He reports that he does not use drugs.    Review of patient's allergies indicates:  No Known Allergies    Medications:   Medications Prior to Admission   Medication Sig Dispense Refill Last Dose    furosemide (LASIX) 40 MG tablet TAKE 1 TABLET BY MOUTH TWICE A  tablet 1 Past Week    JARDIANCE 10 mg tablet TAKE 1 TABLET BY MOUTH EVERY DAY 90 tablet 1 Past Week    lisinopriL (PRINIVIL,ZESTRIL) 2.5 MG tablet TAKE 1 TABLET BY MOUTH EVERY DAY 90 tablet 3 Past Week    potassium chloride (MICRO-K) 10 MEQ CpSR Take 1 capsule (10 mEq total) by mouth once daily. When taking furosemide (lasix) 90 capsule 3 Past Week    acetaminophen (TYLENOL) 325 MG tablet Take 2 tablets (650 mg total) by  mouth every 6 (six) hours as needed (arthritis pain).  0     ketoconazole (NIZORAL) 2 % shampoo APPLY TOPICALLY TWICE A WEEK. APPLY TO SCALP, LATHER, LET SIT 2-5 MINUTES THEN RINSE. 120 mL 3     nystatin (MYCOSTATIN) powder Apply topically 2 (two) times daily. 60 g 5     omeprazole (PRILOSEC) 40 MG capsule TAKE 1 CAPSULE BY MOUTH EVERY DAY 90 capsule 3     tadalafiL (CIALIS) 20 MG Tab Take 1 tablet (20 mg total) by mouth once daily. Take 1 hour before intercourse 10 tablet 11     tirzepatide (MOUNJARO) 5 mg/0.5 mL PnIj Inject 5 mg into the skin every 7 days. for 4 doses 4 pen 0     [START ON 9/14/2023] tirzepatide (MOUNJARO) 7.5 mg/0.5 mL PnIj Inject 7.5 mg into the skin every 7 days. for 4 doses 4 pen 0        Physical Exam:    Vital Signs:   Vitals:    08/23/23 1311   BP: 133/63   Pulse: 73   Resp: 16   Temp: 98.1 °F (36.7 °C)       General Appearance: Well appearing in no acute distress  Lungs: no labored breathing  CVS:  regular rate  Abdomen: non tender    Labs:  Lab Results   Component Value Date    WBC 4.98 07/03/2023    HGB 12.9 (L) 07/03/2023    HCT 41.7 07/03/2023     07/03/2023    CHOL 177 03/13/2023    TRIG 53 03/13/2023    HDL 42 03/13/2023     (H) 07/03/2023    AST 85 (H) 07/03/2023     07/03/2023    K 3.9 07/03/2023     07/03/2023    CREATININE 0.8 07/03/2023    BUN 8 07/03/2023    CO2 28 07/03/2023    TSH 2.346 03/13/2023    PSA 0.24 08/19/2013    INR 1.1 07/03/2023    GLUF 99 10/12/2016    HGBA1C 5.7 (H) 07/01/2023       I have explained the risks and benefits of this endoscopic procedure to the patient including but not limited to bleeding, inflammation, infection, perforation, and death.      Blake Hannah MD

## 2023-08-23 NOTE — ANESTHESIA PREPROCEDURE EVALUATION
08/23/2023  Ke Oliver is a 53 y.o., male.    Patient Active Problem List   Diagnosis    Obstructive sleep apnea on CPAP    Essential hypertension    Morbid obesity with body mass index of 70 and over in adult    Iron deficiency anemia    Restless leg syndrome    Bilateral primary osteoarthritis of knee    Prediabetes    STALLINGS (dyspnea on exertion)    Simple chronic bronchitis    Erectile dysfunction due to arterial insufficiency    Acute gallstone pancreatitis     Past Surgical History:   Procedure Laterality Date    ANKLE SURGERY  1978    ankle fracture pinnned    COLONOSCOPY N/A 8/4/2016    COLONOSCOPY;  Surgeon: Carson Mittal MD    HERNIA REPAIR  1999    herniated testicle       Pre-op Assessment    I have reviewed the Patient Summary Reports.     I have reviewed the Nursing Notes. I have reviewed the NPO Status.      Review of Systems      Physical Exam  General: Well nourished    Airway:  Mallampati: II   Mouth Opening: Normal  TM Distance: Normal  Tongue: Normal  Neck ROM: Normal ROM        Anesthesia Plan  Type of Anesthesia, risks & benefits discussed:    Anesthesia Type: Gen ETT, Gen Natural Airway  Intra-op Monitoring Plan: Standard ASA Monitors  Post Op Pain Control Plan: multimodal analgesia  Induction:  IV  Airway Plan: Direct  Informed Consent: Informed consent signed with the Patient and all parties understand the risks and agree with anesthesia plan.  All questions answered.   ASA Score: 2  Day of Surgery Review of History & Physical: H&P Update referred to the surgeon/provider.    Ready For Surgery From Anesthesia Perspective.     .

## 2023-08-24 NOTE — ANESTHESIA POSTPROCEDURE EVALUATION
Anesthesia Post Evaluation    Patient: Ke Oliver    Procedure(s) Performed: Procedure(s) (LRB):  COLONOSCOPY (N/A)    Final Anesthesia Type: general      Patient location during evaluation: PACU  Patient participation: Yes- Able to Participate  Level of consciousness: awake and alert and oriented  Pain management: adequate  Airway patency: patent    PONV status at discharge: No PONV  Anesthetic complications: no      Cardiovascular status: blood pressure returned to baseline and hemodynamically stable  Respiratory status: unassisted  Hydration status: euvolemic  Follow-up not needed.          Vitals Value Taken Time   /88 08/23/23 1548   Temp 36.7 °C (98.1 °F) 08/23/23 1545   Pulse 56 08/23/23 1547   Resp 34 08/23/23 1547   SpO2 96 % 08/23/23 1547   Vitals shown include unvalidated device data.      No case tracking events are documented in the log.      Pain/Karoline Score: Karoline Score: 10 (8/23/2023  3:45 PM)

## 2023-08-28 VITALS
WEIGHT: 315 LBS | OXYGEN SATURATION: 99 % | DIASTOLIC BLOOD PRESSURE: 88 MMHG | HEART RATE: 60 BPM | SYSTOLIC BLOOD PRESSURE: 142 MMHG | TEMPERATURE: 98 F | RESPIRATION RATE: 20 BRPM | BODY MASS INDEX: 50.62 KG/M2 | HEIGHT: 66 IN

## 2023-10-11 NOTE — TELEPHONE ENCOUNTER
Please see the following assessment. This refill request still requires some action on your part. Lisinopril 2.5 mg has not been previously prescribed by you. Pended 90 day supply. Defer to you. Thank you.     28.4

## 2024-02-08 DIAGNOSIS — R73.03 PREDIABETES: Primary | ICD-10-CM

## 2024-02-08 RX ORDER — EMPAGLIFLOZIN 10 MG/1
TABLET, FILM COATED ORAL
Qty: 90 TABLET | Refills: 3 | Status: SHIPPED | OUTPATIENT
Start: 2024-02-08

## 2024-02-08 NOTE — TELEPHONE ENCOUNTER
Care Due:                  Date            Visit Type   Department     Provider  --------------------------------------------------------------------------------                                [facility] INTERNAL  Last Visit: 07-      FOLLOW UP    MEDICINE       Marcos Zavala  Next Visit: None Scheduled  None         None Found                                                            Last  Test          Frequency    Reason                     Performed    Due Date  --------------------------------------------------------------------------------    HBA1C.......  6 months...  JARDIANCE................  07- 12-    Health [facility] Embedded Care Due Messages. Reference number: [id number].   2/08/2024 12:18:17 AM CST

## 2024-02-08 NOTE — TELEPHONE ENCOUNTER
Refill Routing Note   Medication(s) are not appropriate for processing by [facility] for the following reason(s):        Required labs outdated    [facility] action(s):  Defer     Requires labs : Yes             Appointments  past 12m or future 3m with PCP    Date Provider   Last Visit   7/7/2023 Marcos Zavala MD   Next Visit   Visit date not found Marcos Zavala MD   ED visits in past 90 days: 0        Note composed:4:49 AM 02/08/2024

## 2024-02-26 ENCOUNTER — OFFICE VISIT (OUTPATIENT)
Dept: INTERNAL MEDICINE | Facility: CLINIC | Age: 54
End: 2024-02-26
Payer: MEDICARE

## 2024-02-26 VITALS
BODY MASS INDEX: 97.97 KG/M2 | DIASTOLIC BLOOD PRESSURE: 72 MMHG | OXYGEN SATURATION: 96 % | HEART RATE: 77 BPM | SYSTOLIC BLOOD PRESSURE: 126 MMHG | HEIGHT: 66 IN

## 2024-02-26 DIAGNOSIS — R73.03 PREDIABETES: ICD-10-CM

## 2024-02-26 DIAGNOSIS — R06.09 DYSPNEA ON EXERTION: ICD-10-CM

## 2024-02-26 DIAGNOSIS — E78.5 HYPERLIPIDEMIA LDL GOAL <100: ICD-10-CM

## 2024-02-26 DIAGNOSIS — Z00.00 ENCOUNTER FOR ANNUAL PHYSICAL EXAM: Primary | ICD-10-CM

## 2024-02-26 DIAGNOSIS — M17.0 BILATERAL PRIMARY OSTEOARTHRITIS OF KNEE: ICD-10-CM

## 2024-02-26 DIAGNOSIS — Z87.19 HISTORY OF PANCREATITIS: ICD-10-CM

## 2024-02-26 DIAGNOSIS — K21.9 GASTROESOPHAGEAL REFLUX DISEASE WITHOUT ESOPHAGITIS: ICD-10-CM

## 2024-02-26 DIAGNOSIS — E87.6 HYPOKALEMIA: ICD-10-CM

## 2024-02-26 DIAGNOSIS — I10 ESSENTIAL HYPERTENSION: ICD-10-CM

## 2024-02-26 DIAGNOSIS — E66.01 MORBID OBESITY WITH BODY MASS INDEX OF 70 AND OVER IN ADULT: Primary | ICD-10-CM

## 2024-02-26 DIAGNOSIS — E66.01 MORBID OBESITY WITH BODY MASS INDEX OF 70 AND OVER IN ADULT: ICD-10-CM

## 2024-02-26 DIAGNOSIS — R79.89 ELEVATED LFTS: ICD-10-CM

## 2024-02-26 PROCEDURE — 99214 OFFICE O/P EST MOD 30 MIN: CPT | Mod: PBBFAC | Performed by: INTERNAL MEDICINE

## 2024-02-26 PROCEDURE — 99999 PR PBB SHADOW E&M-EST. PATIENT-LVL IV: CPT | Mod: PBBFAC,,, | Performed by: INTERNAL MEDICINE

## 2024-02-26 PROCEDURE — 99396 PREV VISIT EST AGE 40-64: CPT | Mod: S$GLB,,, | Performed by: INTERNAL MEDICINE

## 2024-02-26 RX ORDER — OMEPRAZOLE 40 MG/1
40 CAPSULE, DELAYED RELEASE ORAL DAILY
Qty: 90 CAPSULE | Refills: 3 | Status: SHIPPED | OUTPATIENT
Start: 2024-02-26

## 2024-02-26 RX ORDER — POTASSIUM CHLORIDE 750 MG/1
10 CAPSULE, EXTENDED RELEASE ORAL DAILY
Qty: 90 CAPSULE | Refills: 3 | Status: SHIPPED | OUTPATIENT
Start: 2024-02-26

## 2024-02-26 RX ORDER — LISINOPRIL 2.5 MG/1
2.5 TABLET ORAL DAILY
Qty: 90 TABLET | Refills: 3 | Status: SHIPPED | OUTPATIENT
Start: 2024-02-26

## 2024-02-26 NOTE — PROGRESS NOTES
Subjective:       Patient ID: Ke Oliver is a 53 y.o. male.    Chief Complaint: Follow-up and Medication Refill      HPI  Ke Oliver is a 53 y.o. year old male with HTN, morbid obesity (BMI >70), b/l OA of knees, dyspnea on exertion, prediabetes, history of gallstone pancreatitis presents for follow up.     Review of Systems   Constitutional:  Negative for activity change, appetite change, chills, fatigue, fever and unexpected weight change.   HENT:  Negative for congestion, rhinorrhea and sore throat.    Eyes:  Negative for visual disturbance.   Respiratory:  Positive for shortness of breath (with minimal exertion).    Cardiovascular:  Negative for chest pain.   Gastrointestinal:  Negative for abdominal pain, diarrhea, nausea and vomiting.   Genitourinary:  Negative for difficulty urinating and dysuria.   Musculoskeletal:  Negative for arthralgias, back pain and myalgias.   Skin:  Negative for color change and rash.   Neurological:  Negative for dizziness, weakness and headaches.         Past Medical History:   Diagnosis Date    Bilateral primary osteoarthritis of knee     Hypertension     Obesity     Sleep apnea         Prior to Admission medications    Medication Sig Start Date End Date Taking? Authorizing Provider   acetaminophen (TYLENOL) 325 MG tablet Take 2 tablets (650 mg total) by mouth every 6 (six) hours as needed (arthritis pain). 9/29/19  Yes Jaylan Allen MD   empagliflozin (JARDIANCE) 10 mg tablet TAKE 1 TABLET BY MOUTH EVERY DAY 2/8/24  Yes Marcos Zavala MD   furosemide (LASIX) 40 MG tablet TAKE 1 TABLET BY MOUTH TWICE A DAY 4/20/22  Yes Marcos Zavala MD   ketoconazole (NIZORAL) 2 % shampoo APPLY TOPICALLY TWICE A WEEK. APPLY TO SCALP, LATHER, LET SIT 2-5 MINUTES THEN RINSE. 11/7/22  Yes Marcos Zavala MD   lisinopriL (PRINIVIL,ZESTRIL) 2.5 MG tablet TAKE 1 TABLET BY MOUTH EVERY DAY 2/28/22  Yes Marcos Zavala MD   nystatin (MYCOSTATIN) powder Apply topically 2 (two) times daily.  "10/13/21  Yes Aby Anaya MD   omeprazole (PRILOSEC) 40 MG capsule TAKE 1 CAPSULE BY MOUTH EVERY DAY 5/5/23  Yes Marcos Zavala MD   potassium chloride (MICRO-K) 10 MEQ CpSR Take 1 capsule (10 mEq total) by mouth once daily. When taking furosemide (lasix) 7/24/23  Yes Marcos Zavala MD   tadalafiL (CIALIS) 20 MG Tab Take 1 tablet (20 mg total) by mouth once daily. Take 1 hour before intercourse  Patient not taking: Reported on 2/26/2024 2/17/22   Bentley Ying MD        Past medical history, surgical history, and family medical history reviewed and updated as appropriate.    Medications and allergies reviewed.     Objective:          Vitals:    02/26/24 1013   BP: 126/72   BP Location: Right arm   Patient Position: Sitting   Pulse: 77   SpO2: 96%   Height: 5' 6" (1.676 m)     Body mass index is 97.97 kg/m².  Physical Exam  Constitutional:       General: He is not in acute distress.     Appearance: He is well-developed. He is obese.   HENT:      Head: Normocephalic and atraumatic.      Nose: Nose normal.   Eyes:      General: No scleral icterus.     Pupils: Pupils are equal, round, and reactive to light.   Neck:      Thyroid: No thyromegaly.      Vascular: No JVD.      Trachea: No tracheal deviation.   Cardiovascular:      Rate and Rhythm: Normal rate and regular rhythm.      Heart sounds: Normal heart sounds. No murmur heard.     No friction rub. No gallop.   Pulmonary:      Effort: Pulmonary effort is normal. No respiratory distress.      Breath sounds: Normal breath sounds. No wheezing or rales.   Abdominal:      General: There is no distension.      Palpations: Abdomen is soft. There is no mass.      Tenderness: There is no abdominal tenderness.   Musculoskeletal:         General: No tenderness. Normal range of motion.      Cervical back: Normal range of motion and neck supple.   Lymphadenopathy:      Cervical: No cervical adenopathy.   Skin:     General: Skin is warm and dry.      Findings: No rash. "   Neurological:      Mental Status: He is alert and oriented to person, place, and time.      Cranial Nerves: No cranial nerve deficit.      Deep Tendon Reflexes: Reflexes normal.   Psychiatric:         Behavior: Behavior normal.         Lab Results   Component Value Date    WBC 4.98 07/03/2023    HGB 12.9 (L) 07/03/2023    HCT 41.7 07/03/2023     07/03/2023    CHOL 177 03/13/2023    TRIG 53 03/13/2023    HDL 42 03/13/2023     (H) 07/03/2023    AST 85 (H) 07/03/2023     07/03/2023    K 3.9 07/03/2023     07/03/2023    CREATININE 0.8 07/03/2023    BUN 8 07/03/2023    CO2 28 07/03/2023    TSH 2.346 03/13/2023    PSA 0.24 08/19/2013    INR 1.1 07/03/2023    GLUF 99 10/12/2016    HGBA1C 5.7 (H) 07/01/2023       Assessment:       1. Encounter for annual physical exam    2. Dyspnea on exertion    3. Morbid obesity with body mass index of 70 and over in adult    4. Prediabetes    5. Essential hypertension    6. History of pancreatitis    7. Hyperlipidemia LDL goal <100    8. Bilateral primary osteoarthritis of knee    9. Gastroesophageal reflux disease    10. Hypokalemia    11. Elevated LFTs          Plan:     Ke was seen today for follow-up and medication refill.    Diagnoses and all orders for this visit:    Encounter for annual physical exam    Dyspnea on exertion  Comments:  secondary to morbid obesity.  Orders:  -     WALKER FOR HOME USE    Morbid obesity with body mass index of 70 and over in adult  Comments:  started on mounjaro, lost to follow up - message sent to bariatrics clinic to get him back in.  Orders:  -     WALKER FOR HOME USE    Prediabetes  Comments:  last a1c controlled. recheck today.  Orders:  -     Hemoglobin A1C; Future  -     lisinopriL (PRINIVIL,ZESTRIL) 2.5 MG tablet; Take 1 tablet (2.5 mg total) by mouth once daily.    Essential hypertension  Comments:  controlled, no changes to current management.  Orders:  -     CBC Auto Differential; Future  -     Comprehensive  Metabolic Panel; Future  -     TSH; Future  -     lisinopriL (PRINIVIL,ZESTRIL) 2.5 MG tablet; Take 1 tablet (2.5 mg total) by mouth once daily.    History of pancreatitis  Comments:  history of gallstone pancreatitis; caution with use of GLP-1    Hyperlipidemia LDL goal <100  -     Lipid Panel; Future    Bilateral primary osteoarthritis of knee  -     WALKER FOR HOME USE    Gastroesophageal reflux disease  -     omeprazole (PRILOSEC) 40 MG capsule; Take 1 capsule (40 mg total) by mouth once daily.    Hypokalemia  -     potassium chloride (MICRO-K) 10 MEQ CpSR; Take 1 capsule (10 mEq total) by mouth once daily. When taking furosemide (lasix)    Elevated LFTs  Comments:  recheck hepatic function; likely NAFLD.    Fasting labwork  Discussed nutrition with patient, calorie counting, accountability. Patient v/u. Has been snacking on honey roasted nuts. Drinking sweetened coffees. Cooking eggs with ho grease.   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   Has been having persistent dyspnea on exertion (nothing new) with activity. Would like walker.  Order placed.     Health maintenance reviewed with patient.     Follow up in about 4 months (around 6/26/2024).    Marcos Zavala MD  Internal Medicine / Primary Care  [facility]  2/26/2024

## 2024-02-27 ENCOUNTER — LAB VISIT (OUTPATIENT)
Dept: LAB | Facility: HOSPITAL | Age: 54
End: 2024-02-27
Attending: INTERNAL MEDICINE
Payer: MEDICARE

## 2024-02-27 DIAGNOSIS — E78.5 HYPERLIPIDEMIA LDL GOAL <100: ICD-10-CM

## 2024-02-27 DIAGNOSIS — R73.03 PREDIABETES: ICD-10-CM

## 2024-02-27 DIAGNOSIS — I10 ESSENTIAL HYPERTENSION: ICD-10-CM

## 2024-02-27 LAB
ALBUMIN SERPL BCP-MCNC: 3.4 G/DL (ref 3.5–5.2)
ALP SERPL-CCNC: 61 U/L (ref 55–135)
ALT SERPL W/O P-5'-P-CCNC: 11 U/L (ref 10–44)
ANION GAP SERPL CALC-SCNC: 13 MMOL/L (ref 8–16)
AST SERPL-CCNC: 11 U/L (ref 10–40)
BASOPHILS # BLD AUTO: 0.02 K/UL (ref 0–0.2)
BASOPHILS NFR BLD: 0.3 % (ref 0–1.9)
BILIRUB SERPL-MCNC: 0.4 MG/DL (ref 0.1–1)
BUN SERPL-MCNC: 12 MG/DL (ref 6–20)
CALCIUM SERPL-MCNC: 9.1 MG/DL (ref 8.7–10.5)
CHLORIDE SERPL-SCNC: 106 MMOL/L (ref 95–110)
CHOLEST SERPL-MCNC: 170 MG/DL (ref 120–199)
CHOLEST/HDLC SERPL: 4 {RATIO} (ref 2–5)
CO2 SERPL-SCNC: 23 MMOL/L (ref 23–29)
CREAT SERPL-MCNC: 0.8 MG/DL (ref 0.5–1.4)
DIFFERENTIAL METHOD BLD: ABNORMAL
EOSINOPHIL # BLD AUTO: 0.1 K/UL (ref 0–0.5)
EOSINOPHIL NFR BLD: 1.7 % (ref 0–8)
ERYTHROCYTE [DISTWIDTH] IN BLOOD BY AUTOMATED COUNT: 13.7 % (ref 11.5–14.5)
EST. GFR  (NO RACE VARIABLE): >60 ML/MIN/1.73 M^2
ESTIMATED AVG GLUCOSE: 117 MG/DL (ref 68–131)
GLUCOSE SERPL-MCNC: 89 MG/DL (ref 70–110)
HBA1C MFR BLD: 5.7 % (ref 4–5.6)
HCT VFR BLD AUTO: 41.9 % (ref 40–54)
HDLC SERPL-MCNC: 42 MG/DL (ref 40–75)
HDLC SERPL: 24.7 % (ref 20–50)
HGB BLD-MCNC: 12.8 G/DL (ref 14–18)
IMM GRANULOCYTES # BLD AUTO: 0.02 K/UL (ref 0–0.04)
IMM GRANULOCYTES NFR BLD AUTO: 0.3 % (ref 0–0.5)
LDLC SERPL CALC-MCNC: 115 MG/DL (ref 63–159)
LYMPHOCYTES # BLD AUTO: 1.4 K/UL (ref 1–4.8)
LYMPHOCYTES NFR BLD: 19.9 % (ref 18–48)
MCH RBC QN AUTO: 27.7 PG (ref 27–31)
MCHC RBC AUTO-ENTMCNC: 30.5 G/DL (ref 32–36)
MCV RBC AUTO: 91 FL (ref 82–98)
MONOCYTES # BLD AUTO: 0.8 K/UL (ref 0.3–1)
MONOCYTES NFR BLD: 10.7 % (ref 4–15)
NEUTROPHILS # BLD AUTO: 4.7 K/UL (ref 1.8–7.7)
NEUTROPHILS NFR BLD: 67.1 % (ref 38–73)
NONHDLC SERPL-MCNC: 128 MG/DL
NRBC BLD-RTO: 0 /100 WBC
PLATELET # BLD AUTO: 277 K/UL (ref 150–450)
PMV BLD AUTO: 10.6 FL (ref 9.2–12.9)
POTASSIUM SERPL-SCNC: 4 MMOL/L (ref 3.5–5.1)
PROT SERPL-MCNC: 7.2 G/DL (ref 6–8.4)
RBC # BLD AUTO: 4.62 M/UL (ref 4.6–6.2)
SODIUM SERPL-SCNC: 142 MMOL/L (ref 136–145)
TRIGL SERPL-MCNC: 65 MG/DL (ref 30–150)
TSH SERPL DL<=0.005 MIU/L-ACNC: 3.31 UIU/ML (ref 0.4–4)
WBC # BLD AUTO: 7 K/UL (ref 3.9–12.7)

## 2024-02-27 PROCEDURE — 80061 LIPID PANEL: CPT | Performed by: INTERNAL MEDICINE

## 2024-02-27 PROCEDURE — 80053 COMPREHEN METABOLIC PANEL: CPT | Performed by: INTERNAL MEDICINE

## 2024-02-27 PROCEDURE — 36415 COLL VENOUS BLD VENIPUNCTURE: CPT | Performed by: INTERNAL MEDICINE

## 2024-02-27 PROCEDURE — 83036 HEMOGLOBIN GLYCOSYLATED A1C: CPT | Performed by: INTERNAL MEDICINE

## 2024-02-27 PROCEDURE — 85025 COMPLETE CBC W/AUTO DIFF WBC: CPT | Performed by: INTERNAL MEDICINE

## 2024-02-27 PROCEDURE — 84443 ASSAY THYROID STIM HORMONE: CPT | Performed by: INTERNAL MEDICINE

## 2024-06-10 ENCOUNTER — PATIENT MESSAGE (OUTPATIENT)
Dept: INTERNAL MEDICINE | Facility: CLINIC | Age: 54
End: 2024-06-10
Payer: MEDICARE

## 2025-02-26 DIAGNOSIS — I10 ESSENTIAL HYPERTENSION: ICD-10-CM

## 2025-02-26 DIAGNOSIS — R73.03 PREDIABETES: ICD-10-CM

## 2025-02-26 RX ORDER — LISINOPRIL 2.5 MG/1
2.5 TABLET ORAL
Qty: 90 TABLET | Refills: 0 | Status: SHIPPED | OUTPATIENT
Start: 2025-02-26

## 2025-02-26 NOTE — TELEPHONE ENCOUNTER
Care Due:                  Date            Visit Type   Department     Provider  --------------------------------------------------------------------------------                                EP -                              PRIMARY      [facility] INTERNAL  Last Visit: 02-      CARE ([facility])   MEDICINE       Marcos Zavala  Next Visit: None Scheduled  None         None Found                                                            Last  Test          Frequency    Reason                     Performed    Due Date  --------------------------------------------------------------------------------    Office Visit  15 months..  empagliflozin,             02- 05-                             lisinopriL, omeprazole,                             potassium................    CMP.........  12 months..  empagliflozin,             02- 02-                             lisinopriL, potassium....    HBA1C.......  6 months...  empagliflozin............  02- 08-    Health [facility] Embedded Care Due Messages. Reference number: [id number].   2/26/2025 12:20:27 AM CST

## 2025-02-26 NOTE — TELEPHONE ENCOUNTER
Refill Routing Note   Medication(s) are not appropriate for processing by [facility] for the following reason(s):        Required labs outdated  Required vitals outdated    [facility] action(s):  Defer   Requires appointment : Yes     Requires labs : Yes             Appointments  past 12m or future 3m with PCP    Date Provider   Last Visit   2/26/2024 Marcos Zavala MD   Next Visit   Visit date not found Marcos Zavala MD   ED visits in past 90 days: 0        Note composed:7:19 AM 02/26/2025

## 2025-02-27 DIAGNOSIS — K21.9 GASTROESOPHAGEAL REFLUX DISEASE WITHOUT ESOPHAGITIS: ICD-10-CM

## 2025-02-27 RX ORDER — OMEPRAZOLE 40 MG/1
40 CAPSULE, DELAYED RELEASE ORAL
Qty: 90 CAPSULE | Refills: 3 | Status: SHIPPED | OUTPATIENT
Start: 2025-02-27

## 2025-02-27 NOTE — TELEPHONE ENCOUNTER
Refill Decision Note   Ke Oliver  is requesting a refill authorization.  Brief Assessment and Rationale for Refill:  Approve     Medication Therapy Plan:         Comments:     Note composed:8:12 AM 02/27/2025

## 2025-02-27 NOTE — TELEPHONE ENCOUNTER
No care due was identified.  Health [facility] Embedded Care Due Messages. Reference number: [id number].   2/27/2025 2:22:38 AM CST

## 2025-03-05 ENCOUNTER — PATIENT OUTREACH (OUTPATIENT)
Dept: INTERNAL MEDICINE | Facility: CLINIC | Age: 55
End: 2025-03-05
Payer: MEDICARE

## 2025-03-05 VITALS — SYSTOLIC BLOOD PRESSURE: 122 MMHG | DIASTOLIC BLOOD PRESSURE: 70 MMHG

## 2025-03-17 DIAGNOSIS — I50.31 ACUTE DIASTOLIC HEART FAILURE: ICD-10-CM

## 2025-03-17 RX ORDER — FUROSEMIDE 40 MG/1
40 TABLET ORAL 2 TIMES DAILY
Qty: 180 TABLET | Refills: 1 | Status: SHIPPED | OUTPATIENT
Start: 2025-03-17

## 2025-03-17 NOTE — TELEPHONE ENCOUNTER
No care due was identified.  [facility] Embedded Care Due Messages. Reference number: [id number].   3/17/2025 9:26:35 AM CDT

## 2025-05-14 DIAGNOSIS — I10 ESSENTIAL HYPERTENSION: ICD-10-CM

## 2025-05-30 DIAGNOSIS — I10 ESSENTIAL HYPERTENSION: ICD-10-CM

## 2025-05-30 DIAGNOSIS — R73.03 PREDIABETES: ICD-10-CM

## 2025-05-30 NOTE — TELEPHONE ENCOUNTER
Called pt to try and schedule annual appt. He said that now is not a good time for him because he does not have a vehicle and depends on his brother for transportation but his brother is busy with a new job. Pt said he will call the office when he has his situation handled.

## 2025-05-30 NOTE — TELEPHONE ENCOUNTER
Care Due:                  Date            Visit Type   Department     Provider  --------------------------------------------------------------------------------                                EP -                              PRIMARY      [facility] INTERNAL  Last Visit: 02-      CARE ([facility])   MEDICINE       Marcos Zavala  Next Visit: None Scheduled  None         None Found                                                            Last  Test          Frequency    Reason                     Performed    Due Date  --------------------------------------------------------------------------------    Office Visit  15 months..  empagliflozin,             02- 05-                             furosemide, lisinopriL,                             omeprazole, potassium....    CMP.........  12 months..  empagliflozin,             02- 02-                             furosemide, lisinopriL,                             potassium................    HBA1C.......  6 months...  empagliflozin............  02- 08-    [facility] Embedded Care Due Messages. Reference number: [id number].   5/30/2025 12:17:55 AM CDT

## 2025-05-31 RX ORDER — LISINOPRIL 2.5 MG/1
2.5 TABLET ORAL
Qty: 90 TABLET | Refills: 0 | Status: SHIPPED | OUTPATIENT
Start: 2025-05-31

## 2025-09-03 ENCOUNTER — TELEPHONE (OUTPATIENT)
Dept: INTERNAL MEDICINE | Facility: CLINIC | Age: 55
End: 2025-09-03
Payer: MEDICARE